# Patient Record
Sex: FEMALE | Race: OTHER | HISPANIC OR LATINO | Employment: UNEMPLOYED | URBAN - METROPOLITAN AREA
[De-identification: names, ages, dates, MRNs, and addresses within clinical notes are randomized per-mention and may not be internally consistent; named-entity substitution may affect disease eponyms.]

---

## 2021-04-12 ENCOUNTER — IMMUNIZATIONS (OUTPATIENT)
Dept: FAMILY MEDICINE CLINIC | Facility: HOSPITAL | Age: 56
End: 2021-04-12

## 2021-04-12 DIAGNOSIS — Z23 ENCOUNTER FOR IMMUNIZATION: Primary | ICD-10-CM

## 2021-04-12 PROCEDURE — 0001A SARS-COV-2 / COVID-19 MRNA VACCINE (PFIZER-BIONTECH) 30 MCG: CPT

## 2021-04-12 PROCEDURE — 91300 SARS-COV-2 / COVID-19 MRNA VACCINE (PFIZER-BIONTECH) 30 MCG: CPT

## 2021-05-07 ENCOUNTER — IMMUNIZATIONS (OUTPATIENT)
Dept: FAMILY MEDICINE CLINIC | Facility: HOSPITAL | Age: 56
End: 2021-05-07

## 2021-05-07 DIAGNOSIS — Z23 ENCOUNTER FOR IMMUNIZATION: Primary | ICD-10-CM

## 2021-05-07 PROCEDURE — 91300 SARS-COV-2 / COVID-19 MRNA VACCINE (PFIZER-BIONTECH) 30 MCG: CPT

## 2021-05-07 PROCEDURE — 0002A SARS-COV-2 / COVID-19 MRNA VACCINE (PFIZER-BIONTECH) 30 MCG: CPT

## 2021-05-12 ENCOUNTER — OFFICE VISIT (OUTPATIENT)
Dept: INTERNAL MEDICINE CLINIC | Facility: CLINIC | Age: 56
End: 2021-05-12
Payer: COMMERCIAL

## 2021-05-12 VITALS
OXYGEN SATURATION: 98 % | TEMPERATURE: 97 F | WEIGHT: 148 LBS | HEART RATE: 73 BPM | SYSTOLIC BLOOD PRESSURE: 134 MMHG | DIASTOLIC BLOOD PRESSURE: 88 MMHG | HEIGHT: 64 IN | BODY MASS INDEX: 25.27 KG/M2

## 2021-05-12 DIAGNOSIS — Z11.4 SCREENING FOR HIV (HUMAN IMMUNODEFICIENCY VIRUS): ICD-10-CM

## 2021-05-12 DIAGNOSIS — Z11.59 NEED FOR HEPATITIS C SCREENING TEST: ICD-10-CM

## 2021-05-12 DIAGNOSIS — Z12.31 ENCOUNTER FOR SCREENING MAMMOGRAM FOR MALIGNANT NEOPLASM OF BREAST: ICD-10-CM

## 2021-05-12 DIAGNOSIS — G43.019 INTRACTABLE MIGRAINE WITHOUT AURA AND WITHOUT STATUS MIGRAINOSUS: ICD-10-CM

## 2021-05-12 DIAGNOSIS — Z00.00 ANNUAL PHYSICAL EXAM: Primary | ICD-10-CM

## 2021-05-12 PROCEDURE — 3725F SCREEN DEPRESSION PERFORMED: CPT | Performed by: FAMILY MEDICINE

## 2021-05-12 PROCEDURE — 99386 PREV VISIT NEW AGE 40-64: CPT | Performed by: FAMILY MEDICINE

## 2021-05-12 NOTE — PROGRESS NOTES
ADULT ANNUAL [address]     NAME: Marisol Rodríguez  AGE: 63 y o  SEX: female  : 1965     DATE: 2021     Assessment and Plan:     Problem List Items Addressed This Visit     None      Visit Diagnoses     Annual physical exam    -  Primary    Relevant Orders    Comprehensive metabolic panel    Lipid Panel with Direct LDL reflex    Intractable migraine without aura and without status migrainosus        Relevant Orders    Ambulatory referral to Neurology    Need for hepatitis C screening test        Relevant Orders    Hepatitis C Antibody ([facility])    Screening for HIV (human immunodeficiency virus)        Relevant Orders    HIV 1/2 Antigen/Antibody (4th Generation) w Reflex [facility]    Encounter for screening mammogram for malignant neoplasm of breast        Relevant Orders    Mammo screening bilateral w 3d & cad        Plan: well adult with migraine refractory to triptans and topomax  neurology referral placed  Screening labs and studies ordered  Immunizations and preventive care screenings were discussed with patient today  Appropriate education was printed on patient's after visit summary  Counseling:  Dental Health: discussed importance of regular tooth brushing, flossing, and dental visits  · Exercise: the importance of regular exercise/physical activity was discussed  Recommend exercise 3-5 times per week for at least 30 minutes  Return in about 3 months (around 2021) for well woman exam       Chief Complaint:     Chief Complaint   Patient presents with    Establish Care      History of Present Illness:     Adult Annual Physical   Patient here for a comprehensive physical exam  The patient reports problems - migraines  years  saw specialist  [facility] didnt cover medication that worked   Sometimes  A migraine can last a whole week  Tried Imitrex and Topamax  but it didn't help   Usually right side of head  contant throbbing  No aura  Will have nasuea, vomiting, jaw pain, associated with migraine   Kept a headache diary for a period of time  Light of devices and some foods can trigger  Diet and Physical Activity  · Diet/Nutrition: well balanced diet  · Exercise: no formal exercise  Depression Screening  PHQ-9 Depression Screening    PHQ-9:   Frequency of the following problems over the past two weeks:      Little interest or pleasure in doing things: 0 - not at all  Feeling down, depressed, or hopeless: 0 - not at all  PHQ-2 Score: 0       General Health  · Sleep: gets 4-6 hours of sleep on average  · Hearing: normal - bilateral   · Vision: most recent eye exam <1 year ago and wears glasses  · Dental: no dental visits for >1 year  /GYN Health  · Patient is: postmenopausal  · Last menstrual period: 11 years aago   · Last mammo-4 years ago   · Last pap-4 years ago   · Last colon cancer screening -2 year ago  Review of Systems:     Review of Systems   HENT: Negative for congestion, postnasal drip, rhinorrhea and sore throat  Eyes: Negative for visual disturbance  Respiratory: Negative for shortness of breath  Cardiovascular: Negative for chest pain  Gastrointestinal: Negative for blood in stool, constipation and diarrhea  Genitourinary: Negative for difficulty urinating and hematuria  Musculoskeletal: Negative for arthralgias, back pain and joint swelling (hands)  Neurological: Positive for headaches  Past Medical History:     History reviewed  No pertinent past medical history  Past Surgical History:     History reviewed  No pertinent surgical history     Social History:     E-Cigarette/Vaping    E-Cigarette Use Never User      E-Cigarette/Vaping Substances    Nicotine No     THC No     CBD No     Flavoring No     Other No     Unknown No      Social History     Socioeconomic History    Marital status: /Civil Union     Spouse name: None    Number of children: None    Years of education: None    Highest education level: None   Occupational History    None   Social Needs    Financial resource strain: None    Food insecurity     Worry: None     Inability: None    Transportation needs     Medical: None     Non-medical: None   Tobacco Use    Smoking status: Never Smoker    Smokeless tobacco: Never Used   Substance and Sexual Activity    Alcohol use: None    Drug use: None    Sexual activity: None   Lifestyle    Physical activity     Days per week: None     Minutes per session: None    Stress: None   Relationships    Social connections     Talks on phone: None     Gets together: None     Attends Synagogue service: None     Active member of club or organization: None     Attends meetings of clubs or organizations: None     Relationship status: None    Intimate partner violence     Fear of current or ex partner: None     Emotionally abused: None     Physically abused: None     Forced sexual activity: None   Other Topics Concern    None   Social History Narrative    None      Family History:     History reviewed  No pertinent family history  Current Medications:     No current outpatient medications on file  No current facility-administered medications for this visit  Allergies:     No Known Allergies   Physical Exam:     /88 (BP Location: Left arm, Patient Position: Sitting) Comment: fsdfsdf  Pulse 73   Temp (!) 97 °F (36 1 °C) (Tympanic) Comment: fsdfs  Ht 5' 4" (1 626 m)   Wt 67 1 kg (148 lb)   SpO2 98%   BMI 25 40 kg/m²     Physical Exam  HENT:      Head: Normocephalic and atraumatic  Right Ear: Tympanic membrane and external ear normal       Left Ear: Tympanic membrane and external ear normal       Nose: Nose normal       Mouth/Throat:      Pharynx: Oropharynx is clear  Comments: Tonsils absent   Eyes:      Extraocular Movements: Extraocular movements intact        Conjunctiva/sclera: Conjunctivae normal  Pupils: Pupils are equal, round, and reactive to light  Cardiovascular:      Rate and Rhythm: Normal rate and regular rhythm  Heart sounds: No murmur  Pulmonary:      Effort: Pulmonary effort is normal       Breath sounds: Normal breath sounds  Abdominal:      General: Bowel sounds are normal       Palpations: Abdomen is soft  Tenderness: There is no abdominal tenderness  Musculoskeletal:      Right lower leg: No edema  Left lower leg: No edema  Skin:     Capillary Refill: Capillary refill takes less than 2 seconds  Neurological:      Mental Status: She is alert and oriented to person, place, and time  Gait: Gait normal       Deep Tendon Reflexes: Reflexes normal    Psychiatric:         Mood and Affect: Mood normal          Behavior: Behavior normal          Thought Content:  Thought content normal             Nelson Dorman DO  [facility]

## 2021-05-12 NOTE — PATIENT INSTRUCTIONS

## 2021-05-19 ENCOUNTER — TELEPHONE (OUTPATIENT)
Dept: NEUROLOGY | Facility: CLINIC | Age: 56
End: 2021-05-19

## 2021-05-19 NOTE — TELEPHONE ENCOUNTER
Best contact number for patient:    Emergency Contact name and number:    Referring provider and telephone     Primary Care Provider Name and if affiliated with [facility]: Ramon Ewing     Reason for Appointment/Dx:Migraines         Neurology Location patient would like to be seen:[facility]     Order received? Yes                                                 Records Received? No     Have you ever seen another Neurologist?       No     Insurance [address] [place] [address]     ID/Policy #:    Secondary Insurance:    ID/Policy#: Workman's Comp/ Accident/ School  Information      Workman's Comp/Accident/School related?        None     If yes name of Insurance company:    Claim #:    Date of Injury:    Type of Injury:     Name and Telephone Number:    Notes:Appointment Appointment schedule with patient new patient pack sent                    Appointment date: 09-16-21 1:00pm

## 2021-05-21 ENCOUNTER — APPOINTMENT (OUTPATIENT)
Dept: LAB | Facility: HOSPITAL | Age: 56
End: 2021-05-21
Payer: COMMERCIAL

## 2021-05-21 DIAGNOSIS — Z11.4 SCREENING FOR HIV (HUMAN IMMUNODEFICIENCY VIRUS): ICD-10-CM

## 2021-05-21 DIAGNOSIS — Z00.00 ANNUAL PHYSICAL EXAM: ICD-10-CM

## 2021-05-21 DIAGNOSIS — Z11.59 NEED FOR HEPATITIS C SCREENING TEST: ICD-10-CM

## 2021-05-21 LAB
ALBUMIN SERPL BCP-MCNC: 4 G/DL (ref 3.5–5)
ALP SERPL-CCNC: 94 U/L (ref 46–116)
ALT SERPL W P-5'-P-CCNC: 50 U/L (ref 12–78)
ANION GAP SERPL CALCULATED.3IONS-SCNC: 6 MMOL/L (ref 4–13)
AST SERPL W P-5'-P-CCNC: 28 U/L (ref 5–45)
BILIRUB SERPL-MCNC: 1.02 MG/DL (ref 0.2–1)
BUN SERPL-MCNC: 14 MG/DL (ref 5–25)
CALCIUM SERPL-MCNC: 8.7 MG/DL (ref 8.3–10.1)
CHLORIDE SERPL-SCNC: 106 MMOL/L (ref 100–108)
CHOLEST SERPL-MCNC: 220 MG/DL (ref 50–200)
CO2 SERPL-SCNC: 32 MMOL/L (ref 21–32)
CREAT SERPL-MCNC: 0.84 MG/DL (ref 0.6–1.3)
GFR SERPL CREATININE-BSD FRML MDRD: 78 ML/MIN/1.73SQ M
GLUCOSE P FAST SERPL-MCNC: 93 MG/DL (ref 65–99)
HCV AB SER QL: NORMAL
HDLC SERPL-MCNC: 43 MG/DL
LDLC SERPL CALC-MCNC: 153 MG/DL (ref 0–100)
POTASSIUM SERPL-SCNC: 4 MMOL/L (ref 3.5–5.3)
PROT SERPL-MCNC: 7.8 G/DL (ref 6.4–8.2)
SODIUM SERPL-SCNC: 144 MMOL/L (ref 136–145)
TRIGL SERPL-MCNC: 119 MG/DL

## 2021-05-21 PROCEDURE — 36415 COLL VENOUS BLD VENIPUNCTURE: CPT

## 2021-05-21 PROCEDURE — 80061 LIPID PANEL: CPT

## 2021-05-21 PROCEDURE — 80053 COMPREHEN METABOLIC PANEL: CPT

## 2021-05-21 PROCEDURE — 87389 HIV-1 AG W/HIV-1&-2 AB AG IA: CPT

## 2021-05-21 PROCEDURE — 86803 HEPATITIS C AB TEST: CPT

## 2021-05-24 LAB — HIV 1+2 AB+HIV1 P24 AG SERPL QL IA: NORMAL

## 2021-05-26 ENCOUNTER — OFFICE VISIT (OUTPATIENT)
Dept: INTERNAL MEDICINE CLINIC | Facility: CLINIC | Age: 56
End: 2021-05-26
Payer: COMMERCIAL

## 2021-05-26 VITALS
TEMPERATURE: 97.9 F | BODY MASS INDEX: 25.1 KG/M2 | DIASTOLIC BLOOD PRESSURE: 70 MMHG | HEIGHT: 64 IN | OXYGEN SATURATION: 98 % | SYSTOLIC BLOOD PRESSURE: 122 MMHG | WEIGHT: 147 LBS | HEART RATE: 66 BPM

## 2021-05-26 DIAGNOSIS — Z12.11 SCREENING FOR COLORECTAL CANCER: ICD-10-CM

## 2021-05-26 DIAGNOSIS — Z23 ENCOUNTER FOR IMMUNIZATION: ICD-10-CM

## 2021-05-26 DIAGNOSIS — Z01.419 ENCOUNTER FOR GYNECOLOGICAL EXAMINATION WITHOUT ABNORMAL FINDING: Primary | ICD-10-CM

## 2021-05-26 DIAGNOSIS — Z12.12 SCREENING FOR COLORECTAL CANCER: ICD-10-CM

## 2021-05-26 PROCEDURE — 90471 IMMUNIZATION ADMIN: CPT

## 2021-05-26 PROCEDURE — 0503F POSTPARTUM CARE VISIT: CPT | Performed by: FAMILY MEDICINE

## 2021-05-26 PROCEDURE — 1036F TOBACCO NON-USER: CPT | Performed by: FAMILY MEDICINE

## 2021-05-26 PROCEDURE — 3008F BODY MASS INDEX DOCD: CPT | Performed by: FAMILY MEDICINE

## 2021-05-26 PROCEDURE — 99396 PREV VISIT EST AGE 40-64: CPT | Performed by: FAMILY MEDICINE

## 2021-05-26 PROCEDURE — 90715 TDAP VACCINE 7 YRS/> IM: CPT

## 2021-05-26 PROCEDURE — G0145 SCR C/V CYTO,THINLAYER,RESCR: HCPCS | Performed by: FAMILY MEDICINE

## 2021-05-26 PROCEDURE — 87624 HPV HI-RISK TYP POOLED RSLT: CPT | Performed by: FAMILY MEDICINE

## 2021-05-26 RX ORDER — RIZATRIPTAN BENZOATE 5 MG/1
5 TABLET ORAL ONCE AS NEEDED
COMMUNITY
End: 2022-08-09 | Stop reason: ALTCHOICE

## 2021-05-26 RX ORDER — NAPROXEN SODIUM 220 MG
220 TABLET ORAL ONCE AS NEEDED
COMMUNITY

## 2021-05-26 NOTE — PROGRESS NOTES
Subjective  Vinton Spurling is a 63 y o   female who presents today for her Annual GYN exam    FDLMP: POST ALEX     She is not currently sexually active  The patient has not had a new sexual partner(s)  She denies abdominal, pelvic pain, problems with her bladder or bowels, vaginal bleeding, vaginal discharge                Screenings / HCM  Vaccines: Indicated today: Tdap   Immunization History   Administered Date(s) Administered    Influenza Quadrivalent Preservative Free 3 years and older IM 10/14/2020    SARS-CoV-2 / COVID-19 mRNA IM ([facility]) 2021, 2021           Cervical Cancer screening:    History of Abnormal Pap: yes   Last Pap: 3 yrs ago     Colon Cancer screening:    Family history of colon cancer: NO   Last colonoscopy: ; [facility]     Breast Cancer screening:    Family history of breast cancer: NO    Last mammogram: ;  Upcoming one is scheduled       Social History     Socioeconomic History    Marital status: /Civil Union     Spouse name: None    Number of children: None    Years of education: None    Highest education level: None   Occupational History    None   Social Needs    Financial resource strain: None    Food insecurity     Worry: None     Inability: None    Transportation needs     Medical: None     Non-medical: None   Tobacco Use    Smoking status: Never Smoker    Smokeless tobacco: Never Used   Substance and Sexual Activity    Alcohol use: None    Drug use: None    Sexual activity: None   Lifestyle    Physical activity     Days per week: None     Minutes per session: None    Stress: None   Relationships    Social connections     Talks on phone: None     Gets together: None     Attends Restorationism service: None     Active member of club or organization: None     Attends meetings of clubs or organizations: None     Relationship status: None    Intimate partner violence     Fear of current or ex partner: None     Emotionally abused: None     Physically abused: None     Forced sexual activity: None   Other Topics Concern    None   Social History Narrative    None       Objective  /70 (BP Location: Left arm, Patient Position: Sitting) Comment: gdf  Pulse 66   Temp 97 9 °F (36 6 °C) (Tympanic) Comment: GFD  Ht 5' 4" (1 626 m)   Wt 66 7 kg (147 lb)   SpO2 98%   BMI 25 23 kg/m²   General  Well developed, well nourished,   Neck  Supple, no thyromegaly  Cardio  Regular rate and rhythm  Lungs  Clear to auscultation bilaterally  Abd  Soft, non-tender, non-distended, no hepatosplenomegaly, no hernia  Breast  Not indicated (USPSTF Guidelines)    External genitalia is normal without lesions  Vagina is pink and vault without discharge  Non-tender cervix, without discharge  Sarepta except area of white scarring noted from 2o'clock to 4 o'clock position    Uterus is mobile, non-tender, and normal in size / shape  Adnexa: Without masses or tenderness B/L  The following portions of the patient's history were reviewed and updated as appropriate: allergies, current medications, past family history, past medical history, past social history, past surgical history and problem list         ASSESSMENT & PLAN:     63 y o   woman for annual GYN exam     - Pap smear W/HR HPV wascollected   - Education about self breast exam provided  - Mammogram already ordered   - Colon cancer screening was ordered  - continue multivitamin, calcium and vitamin D supplementation   - vaccine given and well tolerated  Encounter for gynecological examination without abnormal finding  -     Cervical or vaginal cytopath, thin prep; Future  -     Cervical or vaginal cytopath, thin prep    Screening for colorectal cancer  -     Cologuard; Future    Encounter for immunization  -     TDAP VACCINE GREATER THAN OR EQUAL TO 6YO IM    Other orders  -     naproxen sodium (ALEVE) 220 MG tablet;  Take 220 mg by mouth  -     rizatriptan (MAXALT) 5 MG tablet; Take 5 mg by mouth once as needed for migraine May repeat in 2 hours if needed            Patient Instructions     Breast Self Exam for Women   WHAT YOU NEED TO KNOW:   What is a breast self-exam (BSE)? A BSE is a way to check your breasts for lumps and other changes  Regular BSEs can help you know how your breasts normally look and feel  Most breast lumps or changes are not cancer, but you should always have them checked by a healthcare provider  Your healthcare provider can also watch you do a BSE and can tell you if you are doing your BSE correctly  Why should I do a BSE? Breast cancer is the most common type of cancer in women  Even if you have mammograms, you may still want to do a BSE regularly  If you know how your breasts normally feel and look, it may help you know when to contact your healthcare provider  Mammograms can miss some cancers  You may find a lump during a BSE that did not show up on a mammogram   When should I do a BSE? If you have periods, you may want to do your BSE 1 week after your period ends  This is the time when your breasts may be the least swollen, lumpy, or tender  You can do regular BSEs even if you are breastfeeding or have breast implants  How should I do a BSE? · Look at your breasts in a mirror  Look at the size and shape of each breast and nipple  Check for swelling, lumps, dimpling, scaly skin, or other skin changes  Look for nipple changes, such as a nipple that is painful or beginning to pull inward  Gently squeeze both nipples and check to see if fluid (that is not breast milk) comes out of them  If you find any of these or other breast changes, contact your healthcare provider  Check your breasts while you sit or  the following 3 positions:    ? Hang your arms down at your sides  ? Raise your hands and join them behind your head  ? Put firm pressure with your hands on your hips   Bend slightly forward while you look at your breasts in the mirror  · Lie down and feel your breasts  When you lie down, your breast tissue spreads out evenly over your chest  This makes it easier for you to feel for lumps and anything that may not be normal for your breasts  Do a BSE on one breast at a time  ? Place a small pillow or towel under your left shoulder  Put your left arm behind your head  ? Use the 3 middle fingers of your right hand  Use your fingertip pads, on the top of your fingers  Your fingertip pad is the most sensitive part of your finger  ? Use small circles to feel your breast tissue  Use your fingertip pads to make dime-sized, overlapping circles on your breast and armpits  Use light, medium, and firm pressure  First, press lightly  Second, press with medium pressure to feel a little deeper into the breast  Last, use firm pressure to feel deep within your breast     ? Examine your entire breast area  Examine the breast area from above the breast to below the breast where you feel only ribs  Make small circles with your fingertips, starting in the middle of your armpit  Make circles going up and down the breast area  Continue toward your breast and all the way across it  Examine the area from your armpit all the way over to the middle of your chest (breastbone)  Stop at the middle of your chest     ? Move the pillow or towel to your right shoulder, and put your right arm behind your head  Use the 3 fingertip pads of your left hand, and repeat the above steps to do a BSE on your right breast   What else can I do to check for breast problems or cancer? Talk to your healthcare provider about mammograms  A mammogram is an x-ray of your breasts to screen for breast cancer or other problems  Your provider can tell you the benefits and risks of mammograms  The first mammogram is usually at age 39 or 48  Your provider may recommend you start at 36 or younger if your risk for breast cancer is high   Mammograms usually continue every 1 to 2 years until age 76  When should I call my doctor? · You find any lumps or changes in your breasts  · You have breast pain or fluid coming from your nipples  · You have questions or concerns or concerns about your condition or care  CARE AGREEMENT:   You have the right to help plan your care  Learn about your health condition and how it may be treated  Discuss treatment options with your healthcare providers to decide what care you want to receive  You always have the right to refuse treatment  The above information is an  only  It is not intended as medical advice for individual conditions or treatments  Talk to your doctor, nurse or pharmacist before following any medical regimen to see if it is safe and effective for you  © Copyright [employer] 2021 Information is for End User's use only and may not be sold, redistributed or otherwise used for commercial purposes  All illustrations and images included in CareNotes® are the copyrighted property of A D A M , Inc  or [address] [place] [address] [place]             BMI Counseling: Body mass index is 25 23 kg/m²  The BMI is above normal  Nutrition recommendations include 3-5 servings of fruits/vegetables daily, consuming healthier snacks and moderation in carbohydrate intake

## 2021-05-26 NOTE — PATIENT INSTRUCTIONS
Breast Self Exam for Women   WHAT YOU NEED TO KNOW:   What is a breast self-exam (BSE)? A BSE is a way to check your breasts for lumps and other changes  Regular BSEs can help you know how your breasts normally look and feel  Most breast lumps or changes are not cancer, but you should always have them checked by a healthcare provider  Your healthcare provider can also watch you do a BSE and can tell you if you are doing your BSE correctly  Why should I do a BSE? Breast cancer is the most common type of cancer in women  Even if you have mammograms, you may still want to do a BSE regularly  If you know how your breasts normally feel and look, it may help you know when to contact your healthcare provider  Mammograms can miss some cancers  You may find a lump during a BSE that did not show up on a mammogram   When should I do a BSE? If you have periods, you may want to do your BSE 1 week after your period ends  This is the time when your breasts may be the least swollen, lumpy, or tender  You can do regular BSEs even if you are breastfeeding or have breast implants  How should I do a BSE? · Look at your breasts in a mirror  Look at the size and shape of each breast and nipple  Check for swelling, lumps, dimpling, scaly skin, or other skin changes  Look for nipple changes, such as a nipple that is painful or beginning to pull inward  Gently squeeze both nipples and check to see if fluid (that is not breast milk) comes out of them  If you find any of these or other breast changes, contact your healthcare provider  Check your breasts while you sit or  the following 3 positions:    ? Hang your arms down at your sides  ? Raise your hands and join them behind your head  ? Put firm pressure with your hands on your hips  Bend slightly forward while you look at your breasts in the mirror  · Lie down and feel your breasts    When you lie down, your breast tissue spreads out evenly over your chest  This makes it easier for you to feel for lumps and anything that may not be normal for your breasts  Do a BSE on one breast at a time  ? Place a small pillow or towel under your left shoulder  Put your left arm behind your head  ? Use the 3 middle fingers of your right hand  Use your fingertip pads, on the top of your fingers  Your fingertip pad is the most sensitive part of your finger  ? Use small circles to feel your breast tissue  Use your fingertip pads to make dime-sized, overlapping circles on your breast and armpits  Use light, medium, and firm pressure  First, press lightly  Second, press with medium pressure to feel a little deeper into the breast  Last, use firm pressure to feel deep within your breast     ? Examine your entire breast area  Examine the breast area from above the breast to below the breast where you feel only ribs  Make small circles with your fingertips, starting in the middle of your armpit  Make circles going up and down the breast area  Continue toward your breast and all the way across it  Examine the area from your armpit all the way over to the middle of your chest (breastbone)  Stop at the middle of your chest     ? Move the pillow or towel to your right shoulder, and put your right arm behind your head  Use the 3 fingertip pads of your left hand, and repeat the above steps to do a BSE on your right breast   What else can I do to check for breast problems or cancer? Talk to your healthcare provider about mammograms  A mammogram is an x-ray of your breasts to screen for breast cancer or other problems  Your provider can tell you the benefits and risks of mammograms  The first mammogram is usually at age 39 or 48  Your provider may recommend you start at 36 or younger if your risk for breast cancer is high  Mammograms usually continue every 1 to 2 years until age 76  When should I call my doctor? · You find any lumps or changes in your breasts      · You have breast pain or fluid coming from your nipples  · You have questions or concerns or concerns about your condition or care  CARE AGREEMENT:   You have the right to help plan your care  Learn about your health condition and how it may be treated  Discuss treatment options with your healthcare providers to decide what care you want to receive  You always have the right to refuse treatment  The above information is an  only  It is not intended as medical advice for individual conditions or treatments  Talk to your doctor, nurse or pharmacist before following any medical regimen to see if it is safe and effective for you  © Copyright [address] 2021 Information is for End User's use only and may not be sold, redistributed or otherwise used for commercial purposes   All illustrations and images included in CareNotes® are the copyrighted property of A D A M , Inc  or [address] [place] [address]

## 2021-05-27 LAB
HPV HR 12 DNA CVX QL NAA+PROBE: NEGATIVE
HPV16 DNA CVX QL NAA+PROBE: NEGATIVE
HPV18 DNA CVX QL NAA+PROBE: NEGATIVE

## 2021-06-01 LAB
LAB AP GYN PRIMARY INTERPRETATION: NORMAL
Lab: NORMAL

## 2021-06-15 ENCOUNTER — HOSPITAL ENCOUNTER (OUTPATIENT)
Dept: MAMMOGRAPHY | Facility: CLINIC | Age: 56
Discharge: HOME | End: 2021-06-15
Payer: COMMERCIAL

## 2021-06-15 VITALS — WEIGHT: 147 LBS | BODY MASS INDEX: 25.1 KG/M2 | HEIGHT: 64 IN

## 2021-06-15 DIAGNOSIS — Z12.31 ENCOUNTER FOR SCREENING MAMMOGRAM FOR MALIGNANT NEOPLASM OF BREAST: ICD-10-CM

## 2021-06-15 PROCEDURE — 77067 SCR MAMMO BI INCL CAD: CPT

## 2021-06-15 PROCEDURE — 77063 BREAST TOMOSYNTHESIS BI: CPT

## 2021-06-21 ENCOUNTER — CONSULT (OUTPATIENT)
Dept: NEUROLOGY | Facility: CLINIC | Age: 56
End: 2021-06-21
Payer: COMMERCIAL

## 2021-06-21 VITALS
WEIGHT: 148 LBS | SYSTOLIC BLOOD PRESSURE: 138 MMHG | DIASTOLIC BLOOD PRESSURE: 90 MMHG | BODY MASS INDEX: 25.27 KG/M2 | HEIGHT: 64 IN | HEART RATE: 66 BPM

## 2021-06-21 DIAGNOSIS — G43.019 INTRACTABLE MIGRAINE WITHOUT AURA AND WITHOUT STATUS MIGRAINOSUS: ICD-10-CM

## 2021-06-21 PROCEDURE — 3008F BODY MASS INDEX DOCD: CPT | Performed by: PSYCHIATRY & NEUROLOGY

## 2021-06-21 PROCEDURE — 1036F TOBACCO NON-USER: CPT | Performed by: PSYCHIATRY & NEUROLOGY

## 2021-06-21 PROCEDURE — 99244 OFF/OP CNSLTJ NEW/EST MOD 40: CPT | Performed by: PSYCHIATRY & NEUROLOGY

## 2021-06-21 RX ORDER — TOPIRAMATE 25 MG/1
25 TABLET ORAL DAILY
Qty: 30 TABLET | Refills: 4 | Status: SHIPPED | OUTPATIENT
Start: 2021-06-21 | End: 2021-09-08 | Stop reason: SDUPTHER

## 2021-06-21 RX ORDER — OMEGA-3S/DHA/EPA/FISH OIL/D3 300MG-1000
400 CAPSULE ORAL DAILY
COMMUNITY

## 2021-06-21 RX ORDER — MULTIVIT WITH MINERALS/LUTEIN
1000 TABLET ORAL DAILY
COMMUNITY

## 2021-06-21 NOTE — PROGRESS NOTES
Donna Hernandez is a 63 y o  female  Chief Complaint   Patient presents with    Migraine       Assessment:  1  Intractable migraine without aura and without status migrainosus        Plan:  MRI scan of the brain   blood work  Topamax 25 mg at nighttime  Discussion:   differential diagnosis discussed with the patient most likely migraine headaches, would recommend an MRI scan of the brain and blood work to evaluate for the headaches, patient to call me after the test to discuss the results, we discussed different prophylactic medications she is agreeable to go on Topamax 25 mg at nighttime side effects of the medication discussed with the patient in detail including kidney stones glaucoma and other side effects, patient to  Stop the medication if experiences any side effects, she was also advised to take magnesium 200 mg a day and riboflavin 200 mg a day, side effects discussed with her in detail, she was advised to go to the hospital if has any worsening symptoms and call me avoid migraine triggers including foods to avoid keep herself well hydrated limit caffeine to 12   Oz per day, avoid stress, to go to the hospital if has any worsening symptoms and call me otherwise to see me back in 2 months and follow up with her  other physicians     patient has been having worsening headaches and hence we have requested for an MRI scan of the brain      Subjective:    HPI    patient is here for evaluation of headaches for the last 20 years, she describes her headaches mostly on the right side associated with photophobia and phonophobia associated with nausea and vomiting 3 times a week, the headaches could last for a few hours to a day, she denies having any visual aura there is no motor or sensory symptoms in upper or lower extremities, she does get some nausea and vomiting at times, no other complaints    Vitals:    06/21/21 1138   BP: 138/90   BP Location: Left arm   Patient Position: Sitting   Cuff Size: Adult Pulse: 66   Weight: 67 1 kg (148 lb)   Height: 5' 4" (1 626 m)       Current Medications    Current Outpatient Medications:     Ascorbic Acid (VITAMIN C ADULT GUMMIES PO), Take 500 mg by mouth daily, Disp: , Rfl:     cholecalciferol (VITAMIN D3) 400 units tablet, Take 400 Units by mouth daily, Disp: , Rfl:     Multiple Vitamins-Minerals (MULTIVITAMIN GUMMIES ADULT PO), Take by mouth daily, Disp: , Rfl:     naproxen sodium (ALEVE) 220 MG tablet, Take 220 mg by mouth once as needed , Disp: , Rfl:     rizatriptan (MAXALT) 5 MG tablet, Take 5 mg by mouth once as needed for migraine May repeat in 2 hours if needed, Disp: , Rfl:     vitamin E, tocopherol, 1,000 units capsule, Take 1,000 Units by mouth daily, Disp: , Rfl:       Allergies  Patient has no known allergies  Past Medical History  History reviewed  No pertinent past medical history  Past Surgical History:  Past Surgical History:   Procedure Laterality Date    GALLBLADDER SURGERY      PATELLA SURGERY  2016    Patella replacement on left knee    TONSILECTOMY AND ADNOIDECTOMY  1971    Tonsilectomy only    TUBAL LIGATION  1989         Family History:  Family History   Problem Relation Age of Onset   Jessica Lama Dementia Mother     Alzheimer's disease Mother     Kidney disease Father     Hypertension Father     No Known Problems Sister     Hypertension Sister     Hyperlipidemia Sister     No Known Problems Brother     Heart attack Maternal Grandmother     Parkinsonism Maternal Grandfather     No Known Problems Paternal Grandmother     No Known Problems Paternal Grandfather     No Known Problems Brother     Scoliosis Son     Scoliosis Son     Anxiety disorder Son     Depression Son        Social History:   reports that she has never smoked  She has never used smokeless tobacco  She reports previous alcohol use  She reports that she does not use drugs      I have reviewed the past medical history, surgical history, social and family history, current medications, allergies vitals, review of systems, and updated this information as appropriate today  Objective:    Physical Exam    Neurological Exam    GENERAL:  Cooperative in no acute distress  Well-developed and well-nourished    HEAD and NECK   Head is atraumatic normocephalic with no lesions or masses  Neck is supple with full range of motion    CARDIOVASCULAR  Carotid Arteries-no carotid bruits  NEUROLOGIC:  Mental Status-the patient is awake alert and oriented without aphasia or apraxia  Cranial Nerves: Visual fields are full to confrontation    Extraocular movements are full without nystagmus  Pupils are 2-1/2 mm and reactive  Face is symmetrical to light touch  Movements of facial expression move symmetrically  Hearing is normal to finger rub bilaterally  Soft palate lifts symmetrically  Shoulder shrug is symmetrical  Tongue is midline without atrophy  Motor: No drift is noted on arm extension  Strength is full in the upper and lower extremities with normal bulk and tone  Sensory: Intact to temperature and vibratory sensation in the upper and lower extremities bilaterally  Cortical function is intact  Coordination: Finger to nose testing is performed accurately  Romberg is negative  Gait reveals a normal base with symmetrical arm swing  Tandem walk is normal   Reflexes:    2+ and symmetrical   no temporal artery tenderness  No cervical spine tenderness          ROS:  Review of Systems   Constitutional: Negative for appetite change, fatigue and fever  HENT: Negative for drooling, ear pain, tinnitus, trouble swallowing and voice change  Eyes: Positive for photophobia and pain  Negative for visual disturbance  Respiratory: Negative for chest tightness and shortness of breath  Cardiovascular: Negative for chest pain, palpitations and leg swelling  Gastrointestinal: Negative for abdominal pain, constipation, diarrhea, nausea and vomiting     Endocrine: Negative for cold intolerance and heat intolerance  Genitourinary: Negative for difficulty urinating, frequency and urgency  Musculoskeletal: Negative for back pain, gait problem, joint swelling, myalgias and neck pain  Skin: Negative for rash  Neurological: Positive for headaches  Negative for dizziness, tremors, seizures, syncope, facial asymmetry, speech difficulty, weakness, light-headedness and numbness  Psychiatric/Behavioral: Negative for agitation, behavioral problems, confusion, decreased concentration, dysphoric mood and sleep disturbance  The patient is not nervous/anxious and is not hyperactive

## 2021-06-22 ENCOUNTER — TELEPHONE (OUTPATIENT)
Dept: NEUROLOGY | Facility: CLINIC | Age: 56
End: 2021-06-22

## 2021-06-22 NOTE — TELEPHONE ENCOUNTER
Dr Esequiel Shahid peer to peer is requested for this patient's MRI Brain   Please take a look at your schedule and let me know when you are available to do it

## 2021-06-22 NOTE — TELEPHONE ENCOUNTER
Esther Holley,    This is a Dr Freeman Leaver patient  [facility] is requesting a [id number] for patients MRI Brain  Please call: [address] [place] [address] [id number]  Tracking#: [id number]  Please document the name of provider you spoke with

## 2021-06-23 NOTE — TELEPHONE ENCOUNTER
Please fax my addended note  to fax [phone]  She said once she has that she will approve it       Thank you

## 2021-07-01 ENCOUNTER — HOSPITAL ENCOUNTER (OUTPATIENT)
Dept: MRI IMAGING | Facility: CLINIC | Age: 56
Discharge: HOME | End: 2021-07-01
Payer: COMMERCIAL

## 2021-07-01 DIAGNOSIS — G43.019 INTRACTABLE MIGRAINE WITHOUT AURA AND WITHOUT STATUS MIGRAINOSUS: ICD-10-CM

## 2021-07-01 PROCEDURE — 70551 MRI BRAIN STEM W/O DYE: CPT

## 2021-07-01 PROCEDURE — G1004 CDSM NDSC: HCPCS

## 2021-09-08 ENCOUNTER — TELEMEDICINE (OUTPATIENT)
Dept: NEUROLOGY | Facility: CLINIC | Age: 56
End: 2021-09-08

## 2021-09-08 VITALS — BODY MASS INDEX: 25.27 KG/M2 | WEIGHT: 148 LBS | HEIGHT: 64 IN

## 2021-09-08 DIAGNOSIS — G43.019 INTRACTABLE MIGRAINE WITHOUT AURA AND WITHOUT STATUS MIGRAINOSUS: Primary | ICD-10-CM

## 2021-09-08 PROCEDURE — 99213 OFFICE O/P EST LOW 20 MIN: CPT | Performed by: PSYCHIATRY & NEUROLOGY

## 2021-09-08 RX ORDER — TOPIRAMATE 25 MG/1
25 TABLET ORAL 2 TIMES DAILY
Qty: 60 TABLET | Refills: 4 | Status: SHIPPED | OUTPATIENT
Start: 2021-09-08 | End: 2022-08-09 | Stop reason: ALTCHOICE

## 2021-09-08 NOTE — PROGRESS NOTES
Virtual Regular Visit    Verification of patient location:    Patient is located in the following state in which I hold an active license [place]      Assessment/Plan:  1  Intractable migraine without aura and without status migrainosus         patient's MRI scan of the brain results were reviewed with her it was unremarkable except for a few T2 white matter  Intensity, she did not get the blood work advised her to have the blood work done and she will call me after that to discuss the results she is tolerating the Topamax well but since she continues to have some headaches have advised her to increase the Topamax to 25 mg twice a day, continue with magnesium 200 mg a day and riboflavin 200 mg a day, she was advised to avoid migraine triggers which we discussed in detail, to keep her blood pressure cholesterol and sugar under control to go to the hospital if has any worsening symptoms and call me otherwise to see me back in 4 months and follow up with other physicians  Problem List Items Addressed This Visit     None               Reason for visit is   Chief Complaint   Patient presents with    Virtual Regular Visit        Encounter provider Flora Davis MD    Provider located at [address] [id number]      Recent Visits  No visits were found meeting these conditions  Showing recent visits within past 7 days and meeting all other requirements  Today's Visits  Date Type Provider Dept   09/08/21 Telemedicine Flora Davis MD [facility] [phone] today's visits and meeting all other requirements  Future Appointments  No visits were found meeting these conditions  Showing future appointments within next 150 days and meeting all other requirements       The patient was identified by name and date of birth   Lina Snider was informed that this is a telemedicine visit and that the visit is being conducted through telephone  My office door was closed  No one else was in the room  She acknowledged consent and understanding of privacy and security of the video platform  The patient has agreed to participate and understands they can discontinue the visit at any time  Patient is aware this is a billable service  Subjective  Marianne Torres is a 61 y o  female  In follow-up for headaches, since her last visit she feels her headaches are slightly better she still gets 2 headaches a week they are mostly on the right side associated with photophobia and phonophobia throbbing in nature about 8 on 10 it can last for a few hours to couple of days Topamax seems to have helped her  To a little extent, she denies having any vision or speech difficulty no motor or sensory symptoms in upper or lower extremities, no other complaints  HPI     No past medical history on file  Past Surgical History:   Procedure Laterality Date    GALLBLADDER SURGERY      PATELLA SURGERY  2016    Patella replacement on left knee    TONSILECTOMY AND ADNOIDECTOMY  1971    Tonsilectomy only    TUBAL LIGATION  1989       Current Outpatient Medications   Medication Sig Dispense Refill    Ascorbic Acid (VITAMIN C ADULT GUMMIES PO) Take 500 mg by mouth daily      cholecalciferol (VITAMIN D3) 400 units tablet Take 400 Units by mouth daily      Multiple Vitamins-Minerals (MULTIVITAMIN GUMMIES ADULT PO) Take by mouth daily      naproxen sodium (ALEVE) 220 MG tablet Take 220 mg by mouth once as needed       rizatriptan (MAXALT) 5 MG tablet Take 5 mg by mouth once as needed for migraine May repeat in 2 hours if needed      topiramate (TOPAMAX) 25 mg tablet Take 1 tablet (25 mg total) by mouth daily 30 tablet 4    vitamin E, tocopherol, 1,000 units capsule Take 1,000 Units by mouth daily       No current facility-administered medications for this visit  No Known Allergies    Review of Systems   Constitutional: Negative  Negative for appetite change and fever  HENT: Negative  Negative for hearing loss, tinnitus, trouble swallowing and voice change  Eyes: Negative  Negative for photophobia and pain  Respiratory: Negative  Negative for shortness of breath  Cardiovascular: Negative  Negative for palpitations  Gastrointestinal: Negative  Negative for nausea and vomiting  Endocrine: Negative  Negative for cold intolerance  Genitourinary: Negative  Negative for dysuria, frequency and urgency  Musculoskeletal: Negative  Negative for myalgias and neck pain  Skin: Negative  Negative for rash  Allergic/Immunologic: Negative  Neurological: Negative  Negative for dizziness, tremors, seizures, syncope, facial asymmetry, speech difficulty, weakness, light-headedness, numbness and headaches  Hematological: Negative  Does not bruise/bleed easily  Psychiatric/Behavioral: Negative  Negative for confusion, hallucinations and sleep disturbance  I have reviewed the past medical history, surgical history, social and family history, current medications, allergies vitals, review of systems, and updated this information as appropriate today  Video Exam    Vitals:    09/08/21 0908   Weight: 67 1 kg (148 lb)   Height: 5' 4" (1 626 m)       It was my intent to perform this visit via video technology but the patient was not able to do a video connection so the visit was completed via audio telephone only  I spent 10 minutes directly with the patient during this visit    VIRTUAL VISIT DISCLAIMER      Odalis Grey verbally agrees to participate in [employer]  Pt is aware that [employer] could be limited without vital signs or the ability to perform a full hands-on physical Jansen Rustam understands she or the provider may request at any time to terminate the video visit and request the patient to seek care or treatment in person

## 2021-12-17 ENCOUNTER — TELEPHONE (OUTPATIENT)
Dept: INTERNAL MEDICINE CLINIC | Facility: CLINIC | Age: 56
End: 2021-12-17

## 2022-01-21 ENCOUNTER — IMMUNIZATIONS (OUTPATIENT)
Dept: FAMILY MEDICINE CLINIC | Facility: HOSPITAL | Age: 57
End: 2022-01-21

## 2022-01-21 DIAGNOSIS — Z23 ENCOUNTER FOR IMMUNIZATION: Primary | ICD-10-CM

## 2022-01-21 PROCEDURE — 0001A COVID-19 PFIZER VACC 0.3 ML: CPT

## 2022-01-21 PROCEDURE — 91300 COVID-19 PFIZER VACC 0.3 ML: CPT

## 2022-03-16 ENCOUNTER — APPOINTMENT (EMERGENCY)
Dept: CT IMAGING | Facility: HOSPITAL | Age: 57
End: 2022-03-16
Payer: COMMERCIAL

## 2022-03-16 ENCOUNTER — APPOINTMENT (EMERGENCY)
Dept: RADIOLOGY | Facility: HOSPITAL | Age: 57
End: 2022-03-16
Payer: COMMERCIAL

## 2022-03-16 ENCOUNTER — HOSPITAL ENCOUNTER (EMERGENCY)
Facility: HOSPITAL | Age: 57
Discharge: HOME | End: 2022-03-16
Attending: EMERGENCY MEDICINE
Payer: COMMERCIAL

## 2022-03-16 VITALS
DIASTOLIC BLOOD PRESSURE: 75 MMHG | OXYGEN SATURATION: 99 % | RESPIRATION RATE: 20 BRPM | SYSTOLIC BLOOD PRESSURE: 154 MMHG | TEMPERATURE: 98.8 F | HEART RATE: 80 BPM

## 2022-03-16 DIAGNOSIS — S72.402A CLOSED FRACTURE OF LEFT DISTAL FEMUR (HCC): Primary | ICD-10-CM

## 2022-03-16 LAB
ANION GAP SERPL CALCULATED.3IONS-SCNC: 6 MMOL/L (ref 4–13)
APTT PPP: 28 SECONDS (ref 23–37)
BASOPHILS # BLD AUTO: 0.04 THOUSANDS/ΜL (ref 0–0.1)
BASOPHILS NFR BLD AUTO: 1 % (ref 0–1)
BUN SERPL-MCNC: 17 MG/DL (ref 5–25)
CALCIUM SERPL-MCNC: 9 MG/DL (ref 8.3–10.1)
CHLORIDE SERPL-SCNC: 101 MMOL/L (ref 100–108)
CO2 SERPL-SCNC: 32 MMOL/L (ref 21–32)
CREAT SERPL-MCNC: 0.78 MG/DL (ref 0.6–1.3)
EOSINOPHIL # BLD AUTO: 0.05 THOUSAND/ΜL (ref 0–0.61)
EOSINOPHIL NFR BLD AUTO: 1 % (ref 0–6)
ERYTHROCYTE [DISTWIDTH] IN BLOOD BY AUTOMATED COUNT: 12.5 % (ref 11.6–15.1)
GFR SERPL CREATININE-BSD FRML MDRD: 84 ML/MIN/1.73SQ M
GLUCOSE SERPL-MCNC: 89 MG/DL (ref 65–140)
HCT VFR BLD AUTO: 42.3 % (ref 34.8–46.1)
HGB BLD-MCNC: 14.3 G/DL (ref 11.5–15.4)
IMM GRANULOCYTES # BLD AUTO: 0.01 THOUSAND/UL (ref 0–0.2)
IMM GRANULOCYTES NFR BLD AUTO: 0 % (ref 0–2)
INR PPP: 1.05 (ref 0.84–1.19)
LYMPHOCYTES # BLD AUTO: 1.11 THOUSANDS/ΜL (ref 0.6–4.47)
LYMPHOCYTES NFR BLD AUTO: 14 % (ref 14–44)
MCH RBC QN AUTO: 29.7 PG (ref 26.8–34.3)
MCHC RBC AUTO-ENTMCNC: 33.8 G/DL (ref 31.4–37.4)
MCV RBC AUTO: 88 FL (ref 82–98)
MONOCYTES # BLD AUTO: 0.42 THOUSAND/ΜL (ref 0.17–1.22)
MONOCYTES NFR BLD AUTO: 5 % (ref 4–12)
NEUTROPHILS # BLD AUTO: 6.27 THOUSANDS/ΜL (ref 1.85–7.62)
NEUTS SEG NFR BLD AUTO: 79 % (ref 43–75)
NRBC BLD AUTO-RTO: 0 /100 WBCS
PLATELET # BLD AUTO: 213 THOUSANDS/UL (ref 149–390)
PMV BLD AUTO: 11.5 FL (ref 8.9–12.7)
POTASSIUM SERPL-SCNC: 3.5 MMOL/L (ref 3.5–5.3)
PROTHROMBIN TIME: 13.3 SECONDS (ref 11.6–14.5)
RBC # BLD AUTO: 4.82 MILLION/UL (ref 3.81–5.12)
SODIUM SERPL-SCNC: 139 MMOL/L (ref 136–145)
WBC # BLD AUTO: 7.9 THOUSAND/UL (ref 4.31–10.16)

## 2022-03-16 PROCEDURE — 86901 BLOOD TYPING SEROLOGIC RH(D): CPT | Performed by: EMERGENCY MEDICINE

## 2022-03-16 PROCEDURE — 73564 X-RAY EXAM KNEE 4 OR MORE: CPT

## 2022-03-16 PROCEDURE — 73590 X-RAY EXAM OF LOWER LEG: CPT

## 2022-03-16 PROCEDURE — 99284 EMERGENCY DEPT VISIT MOD MDM: CPT

## 2022-03-16 PROCEDURE — 73700 CT LOWER EXTREMITY W/O DYE: CPT

## 2022-03-16 PROCEDURE — 96374 THER/PROPH/DIAG INJ IV PUSH: CPT

## 2022-03-16 PROCEDURE — 86900 BLOOD TYPING SEROLOGIC ABO: CPT | Performed by: EMERGENCY MEDICINE

## 2022-03-16 PROCEDURE — G1004 CDSM NDSC: HCPCS

## 2022-03-16 PROCEDURE — 85025 COMPLETE CBC W/AUTO DIFF WBC: CPT | Performed by: EMERGENCY MEDICINE

## 2022-03-16 PROCEDURE — 96361 HYDRATE IV INFUSION ADD-ON: CPT

## 2022-03-16 PROCEDURE — 80048 BASIC METABOLIC PNL TOTAL CA: CPT | Performed by: EMERGENCY MEDICINE

## 2022-03-16 PROCEDURE — 86850 RBC ANTIBODY SCREEN: CPT | Performed by: EMERGENCY MEDICINE

## 2022-03-16 PROCEDURE — 36415 COLL VENOUS BLD VENIPUNCTURE: CPT | Performed by: EMERGENCY MEDICINE

## 2022-03-16 PROCEDURE — 85730 THROMBOPLASTIN TIME PARTIAL: CPT | Performed by: EMERGENCY MEDICINE

## 2022-03-16 PROCEDURE — 99285 EMERGENCY DEPT VISIT HI MDM: CPT | Performed by: EMERGENCY MEDICINE

## 2022-03-16 PROCEDURE — 85610 PROTHROMBIN TIME: CPT | Performed by: EMERGENCY MEDICINE

## 2022-03-16 RX ORDER — OXYCODONE HYDROCHLORIDE AND ACETAMINOPHEN 5; 325 MG/1; MG/1
1 TABLET ORAL EVERY 4 HOURS PRN
Qty: 15 TABLET | Refills: 0 | Status: SHIPPED | OUTPATIENT
Start: 2022-03-16 | End: 2022-03-26

## 2022-03-16 RX ORDER — MORPHINE SULFATE 4 MG/ML
4 INJECTION, SOLUTION INTRAMUSCULAR; INTRAVENOUS ONCE
Status: COMPLETED | OUTPATIENT
Start: 2022-03-16 | End: 2022-03-16

## 2022-03-16 RX ADMIN — MORPHINE SULFATE 4 MG: 4 INJECTION INTRAVENOUS at 16:33

## 2022-03-16 RX ADMIN — SODIUM CHLORIDE 1000 ML: 0.9 INJECTION, SOLUTION INTRAVENOUS at 16:33

## 2022-03-16 NOTE — ED PROVIDER NOTES
History  Chief Complaint   Patient presents with    Fall     Pt had a fall down carpeted stairs around 1030A  and has a hx of  left patellar replacement 6 years ago  Pt fell down approx 5 stairs  Pt has swelling to left knee but denies bruising  62 y/o female presents to the ED for evaluation after a fall that occurred this morning  She states that she was walking down the steps, missed one, and fell down about 5 steps  She states that she fell onto her L knee, which was hyperflexed  She denies any other injury or pain  States that her toes feel numb  Denies hitting her head or LOC  Denies any neck pain  States that she has taken aleve and iced the knee with minimal relief  She reports that she had a prior patella replacement to the knee years ago  She states that she has not been able to ambulate on the leg since  No other complaints  History provided by:  Patient  Knee Pain  Location:  Knee  Injury: yes    Mechanism of injury: fall    Fall:     Fall occurred:  Down stairs    Impact surface:  Stairs    Point of impact:  Knees  Knee location:  L knee  Pain details:     Quality:  Unable to specify    Radiates to:  Does not radiate    Severity:  Moderate    Onset quality:  Sudden    Timing:  Constant    Progression:  Worsening  Chronicity:  New  Prior injury to area:  Yes  Relieved by:  None tried  Worsened by:  Nothing  Ineffective treatments:  NSAIDs  Associated symptoms: decreased ROM, numbness and swelling    Associated symptoms: no back pain, no fever and no neck pain        Prior to Admission Medications   Prescriptions Last Dose Informant Patient Reported? Taking?    Ascorbic Acid (VITAMIN C ADULT GUMMIES PO)   Yes No   Sig: Take 500 mg by mouth daily   Multiple Vitamins-Minerals (MULTIVITAMIN GUMMIES ADULT PO)   Yes No   Sig: Take by mouth daily   cholecalciferol (VITAMIN D3) 400 units tablet   Yes No   Sig: Take 400 Units by mouth daily   naproxen sodium (ALEVE) 220 MG tablet   Yes No   Sig: Take 220 mg by mouth once as needed    rizatriptan (MAXALT) 5 MG tablet   Yes No   Sig: Take 5 mg by mouth once as needed for migraine May repeat in 2 hours if needed   topiramate (TOPAMAX) 25 mg tablet   No No   Sig: Take 1 tablet (25 mg total) by mouth 2 (two) times a day   vitamin E, tocopherol, 1,000 units capsule   Yes No   Sig: Take 1,000 Units by mouth daily      Facility-Administered Medications: None       Past Medical History:   Diagnosis Date    Arthritis        Past Surgical History:   Procedure Laterality Date    GALLBLADDER SURGERY      PATELLA SURGERY  2016    Patella replacement on left knee    REPLACEMENT TOTAL KNEE Left     TONSILECTOMY AND ADNOIDECTOMY  1971    Tonsilectomy only    TUBAL LIGATION  1989       Family History   Problem Relation Age of Onset    Dementia Mother     Alzheimer's disease Mother     Kidney disease Father     Hypertension Father     No Known Problems Sister     Hypertension Sister     Hyperlipidemia Sister     No Known Problems Brother     Heart attack Maternal Grandmother     Parkinsonism Maternal Grandfather     No Known Problems Paternal Grandmother     No Known Problems Paternal Grandfather     No Known Problems Brother     Scoliosis Son     Scoliosis Son     Anxiety disorder Son     Depression Son      I have reviewed and agree with the history as documented  E-Cigarette/Vaping    E-Cigarette Use Never User      E-Cigarette/Vaping Substances    Nicotine No     THC No     CBD No     Flavoring No     Other No     Unknown No      Social History     Tobacco Use    Smoking status: Never Smoker    Smokeless tobacco: Never Used   Vaping Use    Vaping Use: Never used   Substance Use Topics    Alcohol use: Not Currently    Drug use: Never       Review of Systems   Constitutional: Negative for chills and fever  HENT: Negative for congestion, ear pain and sore throat  Eyes: Negative for pain and visual disturbance     Respiratory: Negative for cough, shortness of breath and wheezing  Cardiovascular: Negative for chest pain and leg swelling  Gastrointestinal: Negative for abdominal pain, diarrhea, nausea and vomiting  Genitourinary: Negative for dysuria, frequency, hematuria and urgency  Musculoskeletal: Negative for back pain, neck pain and neck stiffness  Skin: Negative for rash and wound  Neurological: Negative for weakness, numbness and headaches  Psychiatric/Behavioral: Negative for agitation and confusion  All other systems reviewed and are negative  Physical Exam  Physical Exam  Vitals and nursing note reviewed  Constitutional:       Appearance: She is well-developed  HENT:      Head: Normocephalic and atraumatic  Eyes:      Pupils: Pupils are equal, round, and reactive to light  Cardiovascular:      Rate and Rhythm: Normal rate and regular rhythm  Pulmonary:      Effort: Pulmonary effort is normal       Breath sounds: Normal breath sounds  Abdominal:      General: Bowel sounds are normal       Palpations: Abdomen is soft  Tenderness: There is no abdominal tenderness  Musculoskeletal:      Cervical back: Normal range of motion and neck supple  No tenderness  Comments: L knee- swelling and tenderness to the distal femur and proximal tib/fib  Decreased ROM 2/2 pain and swelling  +2/4 DP pulses  Sensation grossly intact distally  Skin:     General: Skin is warm and dry  Neurological:      General: No focal deficit present  Mental Status: She is alert and oriented to person, place, and time        Comments: No focal deficits         Vital Signs  ED Triage Vitals   Temperature Pulse Respirations Blood Pressure SpO2   03/16/22 1446 03/16/22 1441 03/16/22 1441 03/16/22 1441 03/16/22 1441   98 8 °F (37 1 °C) 96 20 (!) 207/104 98 %      Temp Source Heart Rate Source Patient Position - Orthostatic VS BP Location FiO2 (%)   03/16/22 1446 03/16/22 1441 03/16/22 1441 03/16/22 1441 --   Oral Monitor Sitting Right arm       Pain Score       03/16/22 1633       9           Vitals:    03/16/22 1615 03/16/22 1630 03/16/22 1757 03/16/22 1800   BP:  (!) 184/97 161/76 154/75   Pulse: 83 79 83 80   Patient Position - Orthostatic VS:  Sitting           Visual Acuity      ED Medications  Medications   sodium chloride 0 9 % bolus 1,000 mL (0 mL Intravenous Stopped 3/16/22 1751)   morphine (PF) 4 mg/mL injection 4 mg (4 mg Intravenous Given 3/16/22 1633)       Diagnostic Studies  Results Reviewed     Procedure Component Value Units Date/Time    Protime-INR [[id number]]  (Normal) Collected: 03/16/22 1634    Lab Status: Final result Specimen: Blood from Arm, Right Updated: 03/16/22 1653     Protime 13 3 seconds      INR 1 05    APTT [[id number]]  (Normal) Collected: 03/16/22 1634    Lab Status: Final result Specimen: Blood from Arm, Right Updated: 03/16/22 1653     PTT 28 seconds     Basic metabolic panel [[id number]] Collected: 03/16/22 1634    Lab Status: Final result Specimen: Blood from Arm, Right Updated: 03/16/22 1651     Sodium 139 mmol/L      Potassium 3 5 mmol/L      Chloride 101 mmol/L      CO2 32 mmol/L      ANION GAP 6 mmol/L      BUN 17 mg/dL      Creatinine 0 78 mg/dL      Glucose 89 mg/dL      Calcium 9 0 mg/dL      eGFR 84 ml/min/1 73sq m     Narrative:      Brandy guidelines for Chronic Kidney Disease (CKD):     Stage 1 with normal or high GFR (GFR > 90 mL/min/1 73 square meters)    Stage 2 Mild CKD (GFR = 60-89 mL/min/1 73 square meters)    Stage 3A Moderate CKD (GFR = 45-59 mL/min/1 73 square meters)    Stage 3B Moderate CKD (GFR = 30-44 mL/min/1 73 square meters)    Stage 4 Severe CKD (GFR = 15-29 mL/min/1 73 square meters)    Stage 5 End Stage CKD (GFR <15 mL/min/1 73 square meters)  Note: GFR calculation is accurate only with a steady state creatinine    CBC and differential [[id number]]  (Abnormal) Collected: 03/16/22 1634    Lab Status: Final result Specimen: Blood from Arm, Right Updated: 03/16/22 1644     WBC 7 90 Thousand/uL      RBC 4 82 Million/uL      Hemoglobin 14 3 g/dL      Hematocrit 42 3 %      MCV 88 fL      MCH 29 7 pg      MCHC 33 8 g/dL      RDW 12 5 %      MPV 11 5 fL      Platelets 251 Thousands/uL      nRBC 0 /100 WBCs      Neutrophils Relative 79 %      Immat GRANS % 0 %      Lymphocytes Relative 14 %      Monocytes Relative 5 %      Eosinophils Relative 1 %      Basophils Relative 1 %      Neutrophils Absolute 6 27 Thousands/µL      Immature Grans Absolute 0 01 Thousand/uL      Lymphocytes Absolute 1 11 Thousands/µL      Monocytes Absolute 0 42 Thousand/µL      Eosinophils Absolute 0 05 Thousand/µL      Basophils Absolute 0 04 Thousands/µL                  CT lower extremity wo contrast left   Final Result by Pete Hunter DO (03/16 1657)      Minimally displaced distal femoral fracture, which appears to extend to the trochlear component of the patellofemoral arthroplasty  There is no significant displacement of the trochlear component  Workstation performed: [id number]         XR knee 4+ vw left injury   Final Result by Candy Jorge MD (03/16 1633)      Distal left femoral fracture identified  A CT of the left knee is pending for further characterization            Workstation performed: [id number]         XR tibia fibula 2 views LEFT   Final Result by Candy Jorge MD (03/16 3134)      No evidence of tibial or fibular fracture            Workstation performed: [id number]                    Procedures  Procedures         ED Course  ED Course as of 03/16/22 2002   Wed Mar 16, 2022   1619 Spoke with Dr Audelia Snow from ortho- recommends getting a CT knee    1 Spoke with Dr uAdelia Snow who states that patient can be discharged with crutches, knee immobilizer, and follow up with ortho as outpatient  The office will contact her tomorrow for pain for OR Monday if able or follow up in office Tuesday with Dr Arianne Lovett   Patient agrees with plan and will be provided with ortho information to call tomorrow as well  SBIRT 20yo+      Most Recent Value   SBIRT (22 yo +)    In order to provide better care to our patients, we are screening all of our patients for alcohol and drug use  Would it be okay to ask you these screening questions? Yes Filed at: 03/16/2022 1445   Initial Alcohol Screen: US AUDIT-C     1  How often do you have a drink containing alcohol? 0 Filed at: 03/16/2022 1445   2  How many drinks containing alcohol do you have on a typical day you are drinking? 0 Filed at: 03/16/2022 1445   3b  FEMALE Any Age, or MALE 65+: How often do you have 4 or more drinks on one occassion? 0 Filed at: 03/16/2022 1445   Audit-C Score 0 Filed at: 03/16/2022 1445   MERRITT: How many times in the past year have you    Used an illegal drug or used a prescription medication for non-medical reasons? Never Filed at: 03/16/2022 1445                    MDM  Number of Diagnoses or Management Options  Closed fracture of left distal femur [facility]): new and requires workup  Diagnosis management comments: Patient with L knee- will get xrays and reassess  Patient offered pain meds but states that she does not want any at this time  Patient reevaluated and feels improved  Patient updated on results of tests  Discharge instructions given including medications, follow-up, and return precautions  Patient demonstrates verbal understanding and agrees with plan   Narcotic precautions given        Amount and/or Complexity of Data Reviewed  Clinical lab tests: ordered and reviewed  Tests in the radiology section of CPT®: ordered and reviewed  Tests in the medicine section of CPT®: ordered and reviewed  Discussion of test results with the performing providers: yes  Decide to obtain previous medical records or to obtain history from someone other than the patient: yes  Obtain history from someone other than the patient: yes  Review and summarize past medical records: yes  Discuss the patient with other providers: yes  Independent visualization of images, tracings, or specimens: yes    Patient Progress  Patient progress: improved      Disposition  Final diagnoses:   Closed fracture of left distal femur ([address] )     Time reflects when diagnosis was documented in both MDM as applicable and the Disposition within this note     Time User Action Codes Description Comment    3/16/2022  6:25 PM Any VERA Add [C71 542P] Closed fracture of left distal femur [facility])       ED Disposition     ED Disposition Condition Date/Time Comment    Discharge Stable Wed Mar 16, 2022  6:24 PM Jose Enrique Choi discharge to home/self care              Follow-up Information     Follow up With Specialties Details Why Contact Info Additional Kingsley Wesley MD Orthopedic Surgery Call in 1 day for follow up next week [address] [facility]  [address] [place] [address] Emergency Department Emergency Medicine Go to  immediately for any new or worsening symptoms [address] [facility]  [address] [facility] [address] [facility] Emergency Department, [address] [place] [address], [facility]          Discharge Medication List as of 3/16/2022  6:37 PM      START taking these medications    Details   oxyCODONE-acetaminophen (Percocet) 5-325 mg per tablet Take 1 tablet by mouth every 4 (four) hours as needed for moderate pain for up to 10 days Max Daily Amount: 6 tablets, Starting Wed 3/16/2022, Until Sat 3/26/2022 at 2359, Normal         CONTINUE these medications which have NOT CHANGED    Details   Ascorbic Acid (VITAMIN C ADULT GUMMIES PO) Take 500 mg by mouth daily, Historical Med      cholecalciferol (VITAMIN D3) 400 units tablet Take 400 Units by mouth daily, Historical Med      Multiple Vitamins-Minerals (MULTIVITAMIN GUMMIES ADULT PO) Take by mouth daily, Historical Med      naproxen sodium (ALEVE) 220 MG tablet Take 220 mg by mouth once as needed , Historical Med      rizatriptan (MAXALT) 5 MG tablet Take 5 mg by mouth once as needed for migraine May repeat in 2 hours if needed, Historical Med      topiramate (TOPAMAX) 25 mg tablet Take 1 tablet (25 mg total) by mouth 2 (two) times a day, Starting Wed 9/8/2021, Normal      vitamin E, tocopherol, 1,000 units capsule Take 1,000 Units by mouth daily, Historical Med             No discharge procedures on file      PDMP Review     None          ED Provider  Electronically Signed by           Rebecca Ortiz DO  03/16/22 2002

## 2022-03-17 ENCOUNTER — TELEPHONE (OUTPATIENT)
Dept: OBGYN CLINIC | Facility: HOSPITAL | Age: 57
End: 2022-03-17

## 2022-03-17 ENCOUNTER — PREP FOR PROCEDURE (OUTPATIENT)
Dept: OBGYN CLINIC | Facility: CLINIC | Age: 57
End: 2022-03-17

## 2022-03-17 ENCOUNTER — OFFICE VISIT (OUTPATIENT)
Dept: LAB | Facility: HOSPITAL | Age: 57
End: 2022-03-17
Payer: COMMERCIAL

## 2022-03-17 ENCOUNTER — TELEPHONE (OUTPATIENT)
Dept: OBGYN CLINIC | Facility: CLINIC | Age: 57
End: 2022-03-17

## 2022-03-17 DIAGNOSIS — S72.492A OTHER CLOSED FRACTURE OF DISTAL END OF LEFT FEMUR, INITIAL ENCOUNTER (HCC): Primary | ICD-10-CM

## 2022-03-17 DIAGNOSIS — S72.492A OTHER CLOSED FRACTURE OF DISTAL END OF LEFT FEMUR, INITIAL ENCOUNTER (HCC): ICD-10-CM

## 2022-03-17 LAB
ATRIAL RATE: 69 BPM
P AXIS: 57 DEGREES
PR INTERVAL: 138 MS
QRS AXIS: 54 DEGREES
QRSD INTERVAL: 92 MS
QT INTERVAL: 412 MS
QTC INTERVAL: 441 MS
T WAVE AXIS: 58 DEGREES
VENTRICULAR RATE: 69 BPM

## 2022-03-17 PROCEDURE — 93010 ELECTROCARDIOGRAM REPORT: CPT | Performed by: INTERNAL MEDICINE

## 2022-03-17 PROCEDURE — 93005 ELECTROCARDIOGRAM TRACING: CPT

## 2022-03-17 RX ORDER — CHLORHEXIDINE GLUCONATE 0.12 MG/ML
15 RINSE ORAL ONCE
Status: CANCELLED | OUTPATIENT
Start: 2022-03-17 | End: 2022-03-17

## 2022-03-17 NOTE — TELEPHONE ENCOUNTER
Force on request sent to [facility],  Please advise if the following patient can be forced onto the schedule:    Patient: Judi Lara    : [phone]    MRN: [id number]    Call back #:     Insurance: F[employer]    Reason for appointment:left knee oblique fracture of the distal femur, slightly displaced as seen on the oblique view  Nurse requesting force on tomorrow, 3/18 with Dr Bucky Cooper  Requested doctor/location: Bucky Cooper      Thank you

## 2022-03-17 NOTE — TELEPHONE ENCOUNTER
Patient scheduled for SX w/ Dr Isi Sherwood  Per Sx coordinator, patient does not pre surgical appt     Thank you!

## 2022-03-17 NOTE — TELEPHONE ENCOUNTER
Called pt to set up surgery patient already had lab work done and will have EKG done today or tomorrow at [facility]     PO was scheduled     Patient is aware of surgery date of 03/21/22

## 2022-03-17 NOTE — TELEPHONE ENCOUNTER
Good Afternoon,    Does patient needs to see DR CARDENAS [facility] prior to surgery on 03 21?? Or was pre surgical paperwork/cleareance taken care of?      Thank you

## 2022-03-18 LAB
ABO GROUP BLD: NORMAL
ABO GROUP BLD: NORMAL
BLD GP AB SCN SERPL QL: NEGATIVE
RH BLD: POSITIVE
RH BLD: POSITIVE
SPECIMEN EXPIRATION DATE: NORMAL

## 2022-03-18 NOTE — PRE-PROCEDURE INSTRUCTIONS
Pre-Surgery Instructions:   Medication Instructions    Ascorbic Acid (VITAMIN C ADULT GUMMIES PO) Instructed patient per Anesthesia Guidelines   cholecalciferol (VITAMIN D3) 400 units tablet Instructed patient per Anesthesia Guidelines   Multiple Vitamins-Minerals (MULTIVITAMIN GUMMIES ADULT PO) Instructed patient per Anesthesia Guidelines   naproxen sodium (ALEVE) 220 MG tablet Instructed patient per Anesthesia Guidelines   oxyCODONE-acetaminophen (Percocet) 5-325 mg per tablet Instructed patient to continue taking as prescribed up to and including DOS with sips of water   rizatriptan (MAXALT) 5 MG tablet Instructed patient to continue taking as prescribed up to and including DOS with sips of water   topiramate (TOPAMAX) 25 mg tablet Instructed patient to continue taking as prescribed up to and including DOS with sips of water   vitamin E, tocopherol, 1,000 units capsule Instructed patient per Anesthesia Guidelines  Med list reviewed as above  Also instructed pt not to start any new vitamins/supplements preoperatively and to avoid NSAID's  3 days prior to surgery  Tylenol is acceptable if needed  Pt has and/or is getting CHG surgical soap and verbalizes understanding of preoperative showering protocol  [facility] and pt understands that it may change at any time  All information within "My Surgical Experience" pamphlet reviewed and patient verbalizes understanding and compliance  All questions answered

## 2022-03-20 ENCOUNTER — ANESTHESIA EVENT (OUTPATIENT)
Dept: PERIOP | Facility: HOSPITAL | Age: 57
End: 2022-03-20
Payer: COMMERCIAL

## 2022-03-21 ENCOUNTER — APPOINTMENT (OUTPATIENT)
Dept: RADIOLOGY | Facility: HOSPITAL | Age: 57
End: 2022-03-21
Payer: COMMERCIAL

## 2022-03-21 ENCOUNTER — ANESTHESIA (OUTPATIENT)
Dept: PERIOP | Facility: HOSPITAL | Age: 57
End: 2022-03-21
Payer: COMMERCIAL

## 2022-03-21 ENCOUNTER — HOSPITAL ENCOUNTER (OUTPATIENT)
Facility: HOSPITAL | Age: 57
Setting detail: OUTPATIENT SURGERY
Discharge: HOME | End: 2022-03-22
Attending: ORTHOPAEDIC SURGERY | Admitting: ORTHOPAEDIC SURGERY
Payer: COMMERCIAL

## 2022-03-21 DIAGNOSIS — R09.02 HYPOXIA: ICD-10-CM

## 2022-03-21 DIAGNOSIS — S72.402D CLOSED FRACTURE OF DISTAL END OF LEFT FEMUR WITH ROUTINE HEALING, UNSPECIFIED FRACTURE MORPHOLOGY, SUBSEQUENT ENCOUNTER: Primary | ICD-10-CM

## 2022-03-21 PROBLEM — G43.909 MIGRAINE: Status: ACTIVE | Noted: 2022-03-21

## 2022-03-21 PROBLEM — Z98.890 POSTOPERATIVE HYPOXIA: Status: ACTIVE | Noted: 2022-03-21

## 2022-03-21 PROBLEM — G89.18 POSTOPERATIVE PAIN: Status: ACTIVE | Noted: 2022-03-21

## 2022-03-21 LAB
ANION GAP SERPL CALCULATED.3IONS-SCNC: 7 MMOL/L (ref 4–13)
BASOPHILS # BLD MANUAL: 0 THOUSAND/UL (ref 0–0.1)
BASOPHILS NFR MAR MANUAL: 0 % (ref 0–1)
BUN SERPL-MCNC: 15 MG/DL (ref 5–25)
CALCIUM SERPL-MCNC: 8.6 MG/DL (ref 8.3–10.1)
CHLORIDE SERPL-SCNC: 103 MMOL/L (ref 100–108)
CO2 SERPL-SCNC: 29 MMOL/L (ref 21–32)
CREAT SERPL-MCNC: 0.84 MG/DL (ref 0.6–1.3)
EOSINOPHIL # BLD MANUAL: 0 THOUSAND/UL (ref 0–0.4)
EOSINOPHIL NFR BLD MANUAL: 0 % (ref 0–6)
ERYTHROCYTE [DISTWIDTH] IN BLOOD BY AUTOMATED COUNT: 13.2 % (ref 11.6–15.1)
GFR SERPL CREATININE-BSD FRML MDRD: 77 ML/MIN/1.73SQ M
GLUCOSE P FAST SERPL-MCNC: 144 MG/DL (ref 65–99)
GLUCOSE SERPL-MCNC: 144 MG/DL (ref 65–140)
HCT VFR BLD AUTO: 36 % (ref 34.8–46.1)
HGB BLD-MCNC: 11.4 G/DL (ref 11.5–15.4)
LYMPHOCYTES # BLD AUTO: 0.42 THOUSAND/UL (ref 0.6–4.47)
LYMPHOCYTES # BLD AUTO: 7 % (ref 14–44)
MCH RBC QN AUTO: 30 PG (ref 26.8–34.3)
MCHC RBC AUTO-ENTMCNC: 31.7 G/DL (ref 31.4–37.4)
MCV RBC AUTO: 95 FL (ref 82–98)
MONOCYTES # BLD AUTO: 0.06 THOUSAND/UL (ref 0–1.22)
MONOCYTES NFR BLD: 1 % (ref 4–12)
NEUTROPHILS # BLD MANUAL: 5.57 THOUSAND/UL (ref 1.85–7.62)
NEUTS SEG NFR BLD AUTO: 92 % (ref 43–75)
NT-PROBNP SERPL-MCNC: 253 PG/ML
PLATELET # BLD AUTO: 197 THOUSANDS/UL (ref 149–390)
PLATELET BLD QL SMEAR: ADEQUATE
PMV BLD AUTO: 11.4 FL (ref 8.9–12.7)
POTASSIUM SERPL-SCNC: 4.5 MMOL/L (ref 3.5–5.3)
RBC # BLD AUTO: 3.8 MILLION/UL (ref 3.81–5.12)
SODIUM SERPL-SCNC: 139 MMOL/L (ref 136–145)
WBC # BLD AUTO: 6.05 THOUSAND/UL (ref 4.31–10.16)

## 2022-03-21 PROCEDURE — C1713 ANCHOR/SCREW BN/BN,TIS/BN: HCPCS | Performed by: ORTHOPAEDIC SURGERY

## 2022-03-21 PROCEDURE — NC001 PR NO CHARGE: Performed by: ORTHOPAEDIC SURGERY

## 2022-03-21 PROCEDURE — 27514 TREATMENT OF THIGH FRACTURE: CPT | Performed by: ORTHOPAEDIC SURGERY

## 2022-03-21 PROCEDURE — 99245 OFF/OP CONSLTJ NEW/EST HI 55: CPT | Performed by: INTERNAL MEDICINE

## 2022-03-21 PROCEDURE — 27514 TREATMENT OF THIGH FRACTURE: CPT | Performed by: PHYSICIAN ASSISTANT

## 2022-03-21 PROCEDURE — 80048 BASIC METABOLIC PNL TOTAL CA: CPT | Performed by: INTERNAL MEDICINE

## 2022-03-21 PROCEDURE — 71045 X-RAY EXAM CHEST 1 VIEW: CPT

## 2022-03-21 PROCEDURE — 83880 ASSAY OF NATRIURETIC PEPTIDE: CPT | Performed by: INTERNAL MEDICINE

## 2022-03-21 PROCEDURE — 85027 COMPLETE CBC AUTOMATED: CPT | Performed by: INTERNAL MEDICINE

## 2022-03-21 PROCEDURE — 73560 X-RAY EXAM OF KNEE 1 OR 2: CPT

## 2022-03-21 PROCEDURE — 85007 BL SMEAR W/DIFF WBC COUNT: CPT | Performed by: INTERNAL MEDICINE

## 2022-03-21 PROCEDURE — C1769 GUIDE WIRE: HCPCS | Performed by: ORTHOPAEDIC SURGERY

## 2022-03-21 DEVICE — 3.5MM LOCKING SCREW SLF-TPNG W/STARDRIVE(TM) RECESS 24MM: Type: IMPLANTABLE DEVICE | Site: LEG | Status: FUNCTIONAL

## 2022-03-21 DEVICE — 3.5MM LOCKING SCREW SLF-TPNG W/STARDRIVE(TM) RECESS 38MM: Type: IMPLANTABLE DEVICE | Site: LEG | Status: FUNCTIONAL

## 2022-03-21 DEVICE — 3.5MM LOCKING SCREW SLF-TPNG W/STARDRIVE RECESS 42MM: Type: IMPLANTABLE DEVICE | Site: LEG | Status: FUNCTIONAL

## 2022-03-21 DEVICE — 3.5MM LCP PLATE 8 HOLES 111MM
Type: IMPLANTABLE DEVICE | Site: LEG | Status: FUNCTIONAL
Brand: LCP

## 2022-03-21 DEVICE — 3.5MM CORTEX SCREW SELF-TAPPING 34MM: Type: IMPLANTABLE DEVICE | Site: LEG | Status: FUNCTIONAL

## 2022-03-21 DEVICE — 4.5MM CANNULATED SCREW PARTIALLY THREADED/36MM: Type: IMPLANTABLE DEVICE | Site: LEG | Status: FUNCTIONAL

## 2022-03-21 DEVICE — 4.5MM CANNULATED SCREW PARTIALLY THREADED/72MM: Type: IMPLANTABLE DEVICE | Site: LEG | Status: FUNCTIONAL

## 2022-03-21 RX ORDER — CHLORHEXIDINE GLUCONATE 0.12 MG/ML
15 RINSE ORAL ONCE
Status: COMPLETED | OUTPATIENT
Start: 2022-03-21 | End: 2022-03-21

## 2022-03-21 RX ORDER — TOPIRAMATE 25 MG/1
25 TABLET ORAL 2 TIMES DAILY
Status: DISCONTINUED | OUTPATIENT
Start: 2022-03-21 | End: 2022-03-22 | Stop reason: HOSPADM

## 2022-03-21 RX ORDER — HYDROMORPHONE HCL/PF 1 MG/ML
0.5 SYRINGE (ML) INJECTION
Status: DISCONTINUED | OUTPATIENT
Start: 2022-03-21 | End: 2022-03-21 | Stop reason: HOSPADM

## 2022-03-21 RX ORDER — LABETALOL 20 MG/4 ML (5 MG/ML) INTRAVENOUS SYRINGE
5
Status: DISCONTINUED | OUTPATIENT
Start: 2022-03-21 | End: 2022-03-21 | Stop reason: HOSPADM

## 2022-03-21 RX ORDER — LIDOCAINE HYDROCHLORIDE 10 MG/ML
0.5 INJECTION, SOLUTION EPIDURAL; INFILTRATION; INTRACAUDAL; PERINEURAL ONCE AS NEEDED
Status: DISCONTINUED | OUTPATIENT
Start: 2022-03-21 | End: 2022-03-21 | Stop reason: HOSPADM

## 2022-03-21 RX ORDER — ACETAMINOPHEN 325 MG/1
650 TABLET ORAL EVERY 6 HOURS SCHEDULED
Status: DISCONTINUED | OUTPATIENT
Start: 2022-03-21 | End: 2022-03-22 | Stop reason: HOSPADM

## 2022-03-21 RX ORDER — PROMETHAZINE HYDROCHLORIDE 25 MG/ML
12.5 INJECTION, SOLUTION INTRAMUSCULAR; INTRAVENOUS ONCE AS NEEDED
Status: COMPLETED | OUTPATIENT
Start: 2022-03-21 | End: 2022-03-21

## 2022-03-21 RX ORDER — ONDANSETRON 2 MG/ML
INJECTION INTRAMUSCULAR; INTRAVENOUS AS NEEDED
Status: DISCONTINUED | OUTPATIENT
Start: 2022-03-21 | End: 2022-03-21

## 2022-03-21 RX ORDER — ONDANSETRON 2 MG/ML
4 INJECTION INTRAMUSCULAR; INTRAVENOUS ONCE AS NEEDED
Status: COMPLETED | OUTPATIENT
Start: 2022-03-21 | End: 2022-03-21

## 2022-03-21 RX ORDER — MAGNESIUM HYDROXIDE 1200 MG/15ML
LIQUID ORAL AS NEEDED
Status: DISCONTINUED | OUTPATIENT
Start: 2022-03-21 | End: 2022-03-21 | Stop reason: HOSPADM

## 2022-03-21 RX ORDER — HYDROMORPHONE HCL IN WATER/PF 6 MG/30 ML
0.2 PATIENT CONTROLLED ANALGESIA SYRINGE INTRAVENOUS
Status: DISCONTINUED | OUTPATIENT
Start: 2022-03-21 | End: 2022-03-22

## 2022-03-21 RX ORDER — SODIUM CHLORIDE, SODIUM LACTATE, POTASSIUM CHLORIDE, CALCIUM CHLORIDE 600; 310; 30; 20 MG/100ML; MG/100ML; MG/100ML; MG/100ML
125 INJECTION, SOLUTION INTRAVENOUS CONTINUOUS
Status: DISCONTINUED | OUTPATIENT
Start: 2022-03-21 | End: 2022-03-21 | Stop reason: HOSPADM

## 2022-03-21 RX ORDER — MEPERIDINE HYDROCHLORIDE 50 MG/ML
12.5 INJECTION INTRAMUSCULAR; INTRAVENOUS; SUBCUTANEOUS ONCE
Status: COMPLETED | OUTPATIENT
Start: 2022-03-21 | End: 2022-03-21

## 2022-03-21 RX ORDER — FENTANYL CITRATE/PF 50 MCG/ML
25 SYRINGE (ML) INJECTION
Status: DISCONTINUED | OUTPATIENT
Start: 2022-03-21 | End: 2022-03-21 | Stop reason: HOSPADM

## 2022-03-21 RX ORDER — ASCORBIC ACID 500 MG
500 TABLET ORAL DAILY
Status: DISCONTINUED | OUTPATIENT
Start: 2022-03-22 | End: 2022-03-22 | Stop reason: HOSPADM

## 2022-03-21 RX ORDER — RIZATRIPTAN BENZOATE 5 MG/1
5 TABLET ORAL ONCE AS NEEDED
Status: DISCONTINUED | OUTPATIENT
Start: 2022-03-21 | End: 2022-03-22 | Stop reason: HOSPADM

## 2022-03-21 RX ORDER — ONDANSETRON 2 MG/ML
4 INJECTION INTRAMUSCULAR; INTRAVENOUS EVERY 6 HOURS PRN
Status: DISCONTINUED | OUTPATIENT
Start: 2022-03-21 | End: 2022-03-22 | Stop reason: HOSPADM

## 2022-03-21 RX ORDER — DOCUSATE SODIUM 100 MG/1
100 CAPSULE, LIQUID FILLED ORAL 2 TIMES DAILY
Status: DISCONTINUED | OUTPATIENT
Start: 2022-03-21 | End: 2022-03-22 | Stop reason: HOSPADM

## 2022-03-21 RX ORDER — OXYCODONE HYDROCHLORIDE 10 MG/1
10 TABLET ORAL EVERY 4 HOURS PRN
Status: DISCONTINUED | OUTPATIENT
Start: 2022-03-21 | End: 2022-03-22 | Stop reason: HOSPADM

## 2022-03-21 RX ORDER — TRAMADOL HYDROCHLORIDE 50 MG/1
50 TABLET ORAL EVERY 6 HOURS SCHEDULED
Status: CANCELLED | OUTPATIENT
Start: 2022-03-21

## 2022-03-21 RX ORDER — CEFAZOLIN SODIUM 2 G/50ML
2000 SOLUTION INTRAVENOUS EVERY 8 HOURS
Status: COMPLETED | OUTPATIENT
Start: 2022-03-21 | End: 2022-03-22

## 2022-03-21 RX ORDER — SODIUM CHLORIDE, SODIUM LACTATE, POTASSIUM CHLORIDE, CALCIUM CHLORIDE 600; 310; 30; 20 MG/100ML; MG/100ML; MG/100ML; MG/100ML
100 INJECTION, SOLUTION INTRAVENOUS CONTINUOUS
Status: DISCONTINUED | OUTPATIENT
Start: 2022-03-21 | End: 2022-03-22 | Stop reason: HOSPADM

## 2022-03-21 RX ORDER — HYDROMORPHONE HCL 110MG/55ML
PATIENT CONTROLLED ANALGESIA SYRINGE INTRAVENOUS AS NEEDED
Status: DISCONTINUED | OUTPATIENT
Start: 2022-03-21 | End: 2022-03-21

## 2022-03-21 RX ORDER — ALBUTEROL SULFATE 2.5 MG/3ML
2.5 SOLUTION RESPIRATORY (INHALATION) ONCE AS NEEDED
Status: DISCONTINUED | OUTPATIENT
Start: 2022-03-21 | End: 2022-03-21 | Stop reason: HOSPADM

## 2022-03-21 RX ORDER — PROPOFOL 10 MG/ML
INJECTION, EMULSION INTRAVENOUS AS NEEDED
Status: DISCONTINUED | OUTPATIENT
Start: 2022-03-21 | End: 2022-03-21

## 2022-03-21 RX ORDER — CEFAZOLIN SODIUM 2 G/50ML
2000 SOLUTION INTRAVENOUS ONCE
Status: COMPLETED | OUTPATIENT
Start: 2022-03-21 | End: 2022-03-21

## 2022-03-21 RX ORDER — FENTANYL CITRATE 50 UG/ML
INJECTION, SOLUTION INTRAMUSCULAR; INTRAVENOUS AS NEEDED
Status: DISCONTINUED | OUTPATIENT
Start: 2022-03-21 | End: 2022-03-21

## 2022-03-21 RX ORDER — ACETAMINOPHEN 325 MG/1
650 TABLET ORAL EVERY 6 HOURS PRN
Status: CANCELLED | OUTPATIENT
Start: 2022-03-21

## 2022-03-21 RX ORDER — MIDAZOLAM HYDROCHLORIDE 2 MG/2ML
INJECTION, SOLUTION INTRAMUSCULAR; INTRAVENOUS AS NEEDED
Status: DISCONTINUED | OUTPATIENT
Start: 2022-03-21 | End: 2022-03-21

## 2022-03-21 RX ORDER — LIDOCAINE HYDROCHLORIDE 10 MG/ML
INJECTION, SOLUTION EPIDURAL; INFILTRATION; INTRACAUDAL; PERINEURAL AS NEEDED
Status: DISCONTINUED | OUTPATIENT
Start: 2022-03-21 | End: 2022-03-21

## 2022-03-21 RX ORDER — ONDANSETRON 2 MG/ML
4 INJECTION INTRAMUSCULAR; INTRAVENOUS EVERY 6 HOURS PRN
Status: CANCELLED | OUTPATIENT
Start: 2022-03-21

## 2022-03-21 RX ORDER — ROPIVACAINE HYDROCHLORIDE 5 MG/ML
INJECTION, SOLUTION EPIDURAL; INFILTRATION; PERINEURAL
Status: COMPLETED | OUTPATIENT
Start: 2022-03-21 | End: 2022-03-21

## 2022-03-21 RX ORDER — OXYCODONE HYDROCHLORIDE 5 MG/1
5 TABLET ORAL EVERY 4 HOURS PRN
Status: DISCONTINUED | OUTPATIENT
Start: 2022-03-21 | End: 2022-03-22 | Stop reason: HOSPADM

## 2022-03-21 RX ADMIN — FENTANYL CITRATE 25 MCG: 50 INJECTION, SOLUTION INTRAMUSCULAR; INTRAVENOUS at 14:11

## 2022-03-21 RX ADMIN — TOPIRAMATE 25 MG: 25 TABLET, FILM COATED ORAL at 18:56

## 2022-03-21 RX ADMIN — LABETALOL HYDROCHLORIDE 5 MG: 5 INJECTION, SOLUTION INTRAVENOUS at 16:13

## 2022-03-21 RX ADMIN — HYDROMORPHONE HYDROCHLORIDE 0.2 MG: 0.2 INJECTION, SOLUTION INTRAMUSCULAR; INTRAVENOUS; SUBCUTANEOUS at 22:19

## 2022-03-21 RX ADMIN — ONDANSETRON 4 MG: 2 INJECTION INTRAMUSCULAR; INTRAVENOUS at 15:34

## 2022-03-21 RX ADMIN — CEFAZOLIN SODIUM 2000 MG: 2 SOLUTION INTRAVENOUS at 22:11

## 2022-03-21 RX ADMIN — FENTANYL CITRATE 25 MCG: 50 INJECTION, SOLUTION INTRAMUSCULAR; INTRAVENOUS at 14:29

## 2022-03-21 RX ADMIN — MEPERIDINE HYDROCHLORIDE 12.5 MG: 50 INJECTION INTRAMUSCULAR; INTRAVENOUS; SUBCUTANEOUS at 16:07

## 2022-03-21 RX ADMIN — SODIUM CHLORIDE, SODIUM LACTATE, POTASSIUM CHLORIDE, AND CALCIUM CHLORIDE 100 ML/HR: .6; .31; .03; .02 INJECTION, SOLUTION INTRAVENOUS at 22:23

## 2022-03-21 RX ADMIN — ROPIVACAINE HYDROCHLORIDE 20 ML: 5 INJECTION, SOLUTION EPIDURAL; INFILTRATION; PERINEURAL at 12:35

## 2022-03-21 RX ADMIN — CEFAZOLIN SODIUM 2000 MG: 2 SOLUTION INTRAVENOUS at 12:58

## 2022-03-21 RX ADMIN — PROPOFOL 150 MG: 10 INJECTION, EMULSION INTRAVENOUS at 13:09

## 2022-03-21 RX ADMIN — FENTANYL CITRATE 50 MCG: 50 INJECTION, SOLUTION INTRAMUSCULAR; INTRAVENOUS at 13:18

## 2022-03-21 RX ADMIN — FENTANYL CITRATE 25 MCG: 50 INJECTION, SOLUTION INTRAMUSCULAR; INTRAVENOUS at 13:54

## 2022-03-21 RX ADMIN — DOCUSATE SODIUM 100 MG: 100 CAPSULE, LIQUID FILLED ORAL at 18:55

## 2022-03-21 RX ADMIN — PROMETHAZINE HYDROCHLORIDE 12.5 MG: 25 INJECTION INTRAMUSCULAR; INTRAVENOUS at 17:07

## 2022-03-21 RX ADMIN — FENTANYL CITRATE 25 MCG: 50 INJECTION, SOLUTION INTRAMUSCULAR; INTRAVENOUS at 13:57

## 2022-03-21 RX ADMIN — ACETAMINOPHEN 650 MG: 325 TABLET, FILM COATED ORAL at 18:55

## 2022-03-21 RX ADMIN — SODIUM CHLORIDE, SODIUM LACTATE, POTASSIUM CHLORIDE, AND CALCIUM CHLORIDE: .6; .31; .03; .02 INJECTION, SOLUTION INTRAVENOUS at 15:44

## 2022-03-21 RX ADMIN — HYDROMORPHONE HYDROCHLORIDE 0.5 MG: 2 INJECTION, SOLUTION INTRAMUSCULAR; INTRAVENOUS; SUBCUTANEOUS at 15:04

## 2022-03-21 RX ADMIN — FENTANYL CITRATE 25 MCG: 50 INJECTION, SOLUTION INTRAMUSCULAR; INTRAVENOUS at 13:36

## 2022-03-21 RX ADMIN — MIDAZOLAM HYDROCHLORIDE 2 MG: 1 INJECTION, SOLUTION INTRAMUSCULAR; INTRAVENOUS at 12:28

## 2022-03-21 RX ADMIN — HYDROMORPHONE HYDROCHLORIDE 0.5 MG: 2 INJECTION, SOLUTION INTRAMUSCULAR; INTRAVENOUS; SUBCUTANEOUS at 15:49

## 2022-03-21 RX ADMIN — SODIUM CHLORIDE, SODIUM LACTATE, POTASSIUM CHLORIDE, AND CALCIUM CHLORIDE 100 ML/HR: .6; .31; .03; .02 INJECTION, SOLUTION INTRAVENOUS at 19:01

## 2022-03-21 RX ADMIN — FENTANYL CITRATE 25 MCG: 50 INJECTION, SOLUTION INTRAMUSCULAR; INTRAVENOUS at 16:49

## 2022-03-21 RX ADMIN — LIDOCAINE HYDROCHLORIDE 50 MG: 10 INJECTION, SOLUTION EPIDURAL; INFILTRATION; INTRACAUDAL; PERINEURAL at 13:07

## 2022-03-21 RX ADMIN — CHLORHEXIDINE GLUCONATE 0.12% ORAL RINSE 15 ML: 1.2 LIQUID ORAL at 12:10

## 2022-03-21 RX ADMIN — FENTANYL CITRATE 25 MCG: 50 INJECTION, SOLUTION INTRAMUSCULAR; INTRAVENOUS at 14:17

## 2022-03-21 RX ADMIN — SODIUM CHLORIDE, SODIUM LACTATE, POTASSIUM CHLORIDE, AND CALCIUM CHLORIDE 125 ML/HR: .6; .31; .03; .02 INJECTION, SOLUTION INTRAVENOUS at 12:14

## 2022-03-21 RX ADMIN — ONDANSETRON 4 MG: 2 INJECTION INTRAMUSCULAR; INTRAVENOUS at 16:20

## 2022-03-21 NOTE — H&P
Orthopedics - H&P  Mena Milan 61 y o  female MRN: [id number]  Unit/Bed#: APU 4      Chief Complaint:   Left thigh pain    HPI:  61 y o  female who sustained fall down stairs resulting in distal femur fracture  She denies motor/sensory deficits  She localizes the pain to the area of fracture without radiation proximally or distally  She is a nonsmoker   She presents today for fixation of injury    Review Of Systems:   · Skin: Normal  · Neuro: See HPI  · Musculoskeletal: See HPI  · 14 point review of systems negative except as stated above     Past Medical History:   Past Medical History:   Diagnosis Date    Arthritis     Migraines        Past Surgical History:   Past Surgical History:   Procedure Laterality Date    CHOLECYSTECTOMY      GALLBLADDER SURGERY      JOINT REPLACEMENT      PATELLA SURGERY  2016    Patella replacement on left knee    REPLACEMENT TOTAL KNEE Left     TONSILECTOMY AND ADNOIDECTOMY  1971    Tonsilectomy only    TONSILLECTOMY      TUBAL LIGATION  1989       Family History:  Family history reviewed and non-contributory  Family History   Problem Relation Age of Onset    Dementia Mother     Alzheimer's disease Mother     Kidney disease Father     Hypertension Father     No Known Problems Sister     Hypertension Sister     Hyperlipidemia Sister     No Known Problems Brother     Heart attack Maternal Grandmother     Parkinsonism Maternal Grandfather     No Known Problems Paternal Grandmother     No Known Problems Paternal Grandfather     No Known Problems Brother     Scoliosis Son     Scoliosis Son     Anxiety disorder Son     Depression Son        Social History:  Social History     Socioeconomic History    Marital status:      Spouse name: None    Number of children: None    Years of education: None    Highest education level: None   Occupational History    None   Tobacco Use    Smoking status: Never Smoker    Smokeless tobacco: Never Used   Vaping Use  Vaping Use: Never used   Substance and Sexual Activity    Alcohol use: Not Currently    Drug use: Never    Sexual activity: None   Other Topics Concern    None   Social History Narrative    None     Social Determinants of Health     Financial Resource Strain: Not on file   Food Insecurity: Not on file   Transportation Needs: Not on file   Physical Activity: Not on file   Stress: Not on file   Social Connections: Not on file   Intimate Partner Violence: Not on file   Housing Stability: Not on file       Allergies:   No Known Allergies        Labs:  0   Lab Value Date/Time    HCT 42 3 03/16/2022 1634    HGB 14 3 03/16/2022 1634    INR 1 05 03/16/2022 1634    WBC 7 90 03/16/2022 1634       Meds:    Current Facility-Administered Medications:     ceFAZolin (ANCEF) IVPB (premix in dextrose) 2,000 mg 50 mL, 2,000 mg, Intravenous, Once, Clifford Bray MD    lactated ringers infusion, 125 mL/hr, Intravenous, Continuous, Geraldo Saavedra MD, Last Rate: 125 mL/hr at 03/21/22 1214, 125 mL/hr at 03/21/22 1214    lidocaine (PF) (XYLOCAINE-MPF) 1 % injection 0 5 mL, 0 5 mL, Infiltration, Once PRN, Geraldo Saavedra MD    Facility-Administered Medications Ordered in Other Encounters:     midazolam (VERSED) injection, , Intravenous, PRN, Geraldo Saavedra MD, 2 mg at 03/21/22 1228    Blood Culture:   No results found for: BLOODCX    Wound Culture:   No results found for: WOUNDCULT    Ins and Outs:  No intake/output data recorded  Physical Exam:   /83   Pulse 81   Temp (!) 97 4 °F (36 3 °C) (Temporal)   Resp 16   Ht 5' 4" (1 626 m)   Wt 65 3 kg (143 lb 15 4 oz)   SpO2 98%   BMI 24 71 kg/m²   Gen: Alert and oriented to person, place, time  HEENT: EOMI, eyes clear, moist mucus membranes, hearing intact  Respiratory: Bilateral chest rise   No audible wheezing found  Cardiovascular: extremities perfused, no audible murmurs  Abdomen: soft nontender/nondistended    Musculoskeletal: left lower extremity  · Skin closed at fracture  · TTP at fracture site  · SILT s/s/sp/dp/t    · +ankle df/pf, ehl/fhl motor functions  · 2+ DP pulse    Radiology:   I personally reviewed the films  Xrays and CT of left femur reveal displaced medial condylar fracture    _*_*_*_*_*_*_*_*_*_*_*_*_*_*_*_*_*_*_*_*_*_*_*_*_*_*_*_*_*_*_*_*_*_*_*_*_*_*_*_*_*    Assessment:  61 y  o female with displaced L distal femur medial condylar fracture    Plan:   · NWB   · NPO  · OR for fixation today    Informed consent obtained  Dispo: anticipate dc to home postop  Postop aspirin for dvt ppx    Nael Bhagat MD

## 2022-03-21 NOTE — ANESTHESIA PROCEDURE NOTES
Peripheral Block    Patient location during procedure: holding area  Start time: 3/21/2022 12:30 PM  Reason for block: at surgeon's request and post-op pain management  Staffing  Performed: Anesthesiologist   Anesthesiologist: Peterson Castaneda MD  Preanesthetic Checklist  Completed: patient identified, IV checked, site marked, risks and benefits discussed, surgical consent, monitors and equipment checked, pre-op evaluation and timeout performed  Peripheral Block  Patient position: supine  Prep: ChloraPrep  Patient monitoring: continuous pulse ox, frequent blood pressure checks, cardiac monitor and heart rate  Block type: adductor canal block  Laterality: left  Injection technique: single-shot  Procedures: ultrasound guided, Ultrasound guidance required for the procedure to increase accuracy and safety of medication placement and decrease risk of complications    Ultrasound permanent image savedropivacaine (NAROPIN) 0 5 % perineural infiltration, 20 mL  Needle  Needle type: Stimuplex   Needle gauge: 22 G  Needle length: 10 cm  Needle localization: ultrasound guidance  Needle insertion depth: 3 cm  Test dose: negative  Assessment  Injection assessment: incremental injection, local visualized surrounding nerve on ultrasound, negative aspiration for heme and no paresthesia on injection  Paresthesia pain: none  Heart rate change: no  Slow fractionated injection: yes  Post-procedure:  site cleaned  patient tolerated the procedure well with no immediate complications

## 2022-03-21 NOTE — ASSESSMENT & PLAN NOTE
Patient with postop pain  Multimodal pain control  Avoid excess of narcotics as it may worsen hypoventilation  Suggest Tylenol scheduled, gabapentin 100mg TID and uptitrate

## 2022-03-21 NOTE — ASSESSMENT & PLAN NOTE
Requiring up to 6L of O2 immediately postop  This appears to be worse with administration of pain medicines  Differentials for hypoxemia would include hypoventilation due to the narcotics, atelectasis, volume overload, PE either due to clot or due to fat embolus given long bone fracture and surgical procedure  In her case most likely she is under effect of the anesthesia and hypoventilating  Fortunately patient improving as far as oxygenation  Plan:  1  Continue O2 supplementation and titrating it to a saturation of 92%, no need for hyperoxia  2  Recommend obtainining a chest X ray which may help identify volume overload, atelectasis or infiltrates  3  Encourage incentive spirometry regardless of results above  4  Attempt to minimize narcotic use as patient exhibits hypoventilation with it  Would suggest multimodal pain control with Tylenol scheduled, Gabapentin  5  Recommend obtaining baseline CBC from today to check Hb (anemia may contribute to hypoxemia) and a BMP to check kidney function in case CTA needed  Also check a BNP  6  If patient remains hypoxic for a prolonged postop period (>12h) even if awake I would proceed with a chest CTA assuming her kidney function is stable to rule out PE, otherwise would recommend V/Q scan      Thank you for consultation

## 2022-03-21 NOTE — PROGRESS NOTES
[address]  Progress Note - Jose Tiwari 1965, 61 y o  female MRN: [id number]  Unit/Bed#: -Nicky Encounter: [id number]  Primary Care Provider: Eudora Heimlich, DO   Date and time admitted to hospital: 3/21/2022 11:46 AM    * Postoperative hypoxia  Assessment & Plan  Requiring up to 6L of O2 immediately postop  This appears to be worse with administration of pain medicines  Differentials for hypoxemia would include hypoventilation due to the narcotics, atelectasis, volume overload, PE either due to clot or due to fat embolus given long bone fracture and surgical procedure  In her case most likely she is under effect of the anesthesia and hypoventilating  Fortunately patient improving as far as oxygenation  Plan:  1  Continue O2 supplementation and titrating it to a saturation of 92%, no need for hyperoxia  2  Recommend obtainining a chest X ray which may help identify volume overload, atelectasis or infiltrates  3  Encourage incentive spirometry regardless of results above  4  Attempt to minimize narcotic use as patient exhibits hypoventilation with it  Would suggest multimodal pain control with Tylenol scheduled, Gabapentin  5  Recommend obtaining baseline CBC from today to check Hb (anemia may contribute to hypoxemia) and a BMP to check kidney function in case CTA needed  Also check a BNP  6  If patient remains hypoxic for a prolonged postop period (>12h) even if awake I would proceed with a chest CTA assuming her kidney function is stable to rule out PE, otherwise would recommend V/Q scan      Thank you for consultation      Postoperative pain  Assessment & Plan  Patient with postop pain  Multimodal pain control  Avoid excess of narcotics as it may worsen hypoventilation  Suggest Tylenol scheduled, gabapentin 100mg TID and uptitrate    Closed fracture of distal end of left femur with routine healing  Assessment & Plan  Status post open reduction and internal fixation  Migraine  Assessment & Plan  Previously on topiramate and triptans in the past         VTE Prophylaxis: Sequential compression device (Venodyne)   / sequential compression device     Recommendations for Discharge:  · As per Orthopedic Service    Counseling / Coordination of Care Time: 45 minutes  Greater than 50% of total time spent on patient counseling and coordination of care  Collaboration of Care: Were Recommendations Directly Discussed with Primary Treatment Team? - Yes     History of Present Illness:    Jose Enrique Choi is a 61 y o  female who is originally admitted to the orthopedics service due to distal femoral fracture  We are consulted for postoperative hypoxemia  Patient did have a fall from stairs about 10 steps less than a week ago and since then she has been having pain in her left leg, she was diagnosed with a distal left femur fracture and came today for elective correction  Patient is currently status post open reduction and internal fixation  Postoperatively patient apparently was fairly hypoxic requiring up to 6 L of oxygen, she was also noted to be hypoventilating with a respiratory rate around 10 at times  Patient is still not completely awake, somewhat obtunded with a GCS of 13, still under effects of anesthesia but can answer simple questions  Significant other at the bedside does endorse similar situation in the past in which patient did have a prolonged recover from anesthesia  Patient herself denies any chest pain, subjective shortness of breath, nausea vomiting or diarrhea  Her pain appears under control at this point  Patient denies any previous history of smoking, COPD or asthma, or any other pulmonary issues for that matter  Denies any history of blood clots  Review of Systems:    Review of Systems   Neurological:        Drowsiness   All other systems reviewed and are negative        Past Medical and Surgical History:     Past Medical History: Diagnosis Date    Arthritis     Migraines        Past Surgical History:   Procedure Laterality Date    CHOLECYSTECTOMY      GALLBLADDER SURGERY      JOINT REPLACEMENT      PATELLA SURGERY  2016    Patella replacement on left knee    REPLACEMENT TOTAL KNEE Left     TONSILECTOMY AND ADNOIDECTOMY  1971    Tonsilectomy only    TONSILLECTOMY      TUBAL LIGATION  1989       Meds/Allergies:    all medications and allergies reviewed    Allergies: No Known Allergies    Social History:     Marital Status:     Substance Use History:   Social History     Substance and Sexual Activity   Alcohol Use Not Currently     Social History     Tobacco Use   Smoking Status Never Smoker   Smokeless Tobacco Never Used     Social History     Substance and Sexual Activity   Drug Use Never       Family History:    non-contributory    Physical Exam:     Vitals:   Blood Pressure: 128/79 (03/21/22 1815)  Pulse: 90 (03/21/22 1815)  Temperature: 98 4 °F (36 9 °C) (03/21/22 1815)  Temp Source: Oral (03/21/22 1815)  Respirations: 16 (03/21/22 1815)  Height: 5' 4" (162 6 cm) (03/21/22 1205)  Weight - Scale: 65 3 kg (143 lb 15 4 oz) (03/21/22 1205)  SpO2: 98 % (03/21/22 1815)    Physical Exam  Vitals and nursing note reviewed  Constitutional:       Appearance: Normal appearance  She is normal weight  Comments: Middle-aged female in bed, awake   HENT:      Head: Normocephalic and atraumatic  Right Ear: External ear normal       Left Ear: External ear normal       Nose: Nose normal  No congestion  Mouth/Throat:      Mouth: Mucous membranes are moist       Pharynx: Oropharynx is clear  No oropharyngeal exudate or posterior oropharyngeal erythema  Eyes:      General: No scleral icterus  Right eye: No discharge  Left eye: No discharge  Extraocular Movements: Extraocular movements intact  Conjunctiva/sclera: Conjunctivae normal       Pupils: Pupils are equal, round, and reactive to light  Cardiovascular:      Rate and Rhythm: Normal rate and regular rhythm  Pulses: Normal pulses  Heart sounds: Normal heart sounds  No murmur heard  No friction rub  No gallop  Pulmonary:      Effort: Pulmonary effort is normal  No respiratory distress  Breath sounds: Normal breath sounds  No stridor  No wheezing, rhonchi or rales  Chest:      Chest wall: No tenderness  Abdominal:      General: Abdomen is flat  Bowel sounds are normal  There is no distension  Palpations: Abdomen is soft  There is no mass  Tenderness: There is no abdominal tenderness  There is no guarding or rebound  Musculoskeletal:         General: No swelling, tenderness, deformity or signs of injury  Normal range of motion  Cervical back: Normal range of motion and neck supple  No rigidity  No muscular tenderness  Comments: Left lower extremity currently covered, distal perfusion preserved   Skin:     General: Skin is warm and dry  Capillary Refill: Capillary refill takes less than 2 seconds  Coloration: Skin is not jaundiced or pale  Findings: No bruising, erythema, lesion or rash  Neurological:      General: No focal deficit present  Mental Status: She is alert and oriented to person, place, and time  Mental status is at baseline  Cranial Nerves: No cranial nerve deficit  Sensory: No sensory deficit  Motor: No weakness  Coordination: Coordination normal       Comments: GCS13   Psychiatric:         Mood and Affect: Mood normal          Behavior: Behavior normal          Thought Content: Thought content normal          Judgment: Judgment normal              Additional Data:     Lab Results: I have personally reviewed pertinent reports        Results from last 7 days   Lab Units 03/16/22  1634   WBC Thousand/uL 7 90   HEMOGLOBIN g/dL 14 3   HEMATOCRIT % 42 3   PLATELETS Thousands/uL 213   NEUTROS PCT % 79*   LYMPHS PCT % 14   MONOS PCT % 5   EOS PCT % 1     Results from last 7 days   Lab Units 03/16/22  1634   SODIUM mmol/L 139   POTASSIUM mmol/L 3 5   CHLORIDE mmol/L 101   CO2 mmol/L 32   BUN mg/dL 17   CREATININE mg/dL 0 78   ANION GAP mmol/L 6   CALCIUM mg/dL 9 0   GLUCOSE RANDOM mg/dL 89     Results from last 7 days   Lab Units 03/16/22  1634   INR  1 05         No results found for: HGBA1C            Imaging: I have personally reviewed pertinent reports  XR knee 1 or 2 vw left    (Results Pending)       ·     ** Please Note: This note has been constructed using a voice recognition system   **

## 2022-03-21 NOTE — ASSESSMENT & PLAN NOTE
Previously on topiramate and triptans in the past, but but only as needed  Currently no active migraine

## 2022-03-21 NOTE — CONSULTS
[address] [facility] 1965, 61 y o  female MRN: [id number]  Unit/Bed#: -01 Encounter: [id number]  Primary Care Provider: Linn Pacheco DO   Date and time admitted to hospital: 3/21/2022 11:46 AM    Inpatient consult to Internal Medicine  Consult performed by: Kvng Schultz MD  Consult ordered by: Rondal Kanner, PA-C          * Postoperative hypoxia  Assessment & Plan  Requiring up to 6L of O2 immediately postop  This appears to be worse with administration of pain medicines  Differentials for hypoxemia would include hypoventilation due to the narcotics, atelectasis, volume overload, PE either due to clot or due to fat embolus given long bone fracture and surgical procedure  In her case most likely she is under effect of the anesthesia and hypoventilating  Fortunately patient improving as far as oxygenation  Plan:  1  Continue O2 supplementation and titrating it to a saturation of 92%, no need for hyperoxia  2  Recommend obtainining a chest X ray which may help identify volume overload, atelectasis or infiltrates  3  Encourage incentive spirometry regardless of results above  4  Attempt to minimize narcotic use as patient exhibits hypoventilation with it  Would suggest multimodal pain control with Tylenol scheduled, Gabapentin  5  Recommend obtaining baseline CBC from today to check Hb (anemia may contribute to hypoxemia) and a BMP to check kidney function in case CTA needed  Also check a BNP  6  If patient remains hypoxic for a prolonged postop period (>12h) even if awake I would proceed with a chest CTA assuming her kidney function is stable to rule out PE, otherwise would recommend V/Q scan      Thank you for consultation      Postoperative pain  Assessment & Plan  Patient with postop pain  Multimodal pain control  Avoid excess of narcotics as it may worsen hypoventilation  Suggest Tylenol scheduled, gabapentin 100mg TID and uptitrate    Closed fracture of distal end of left femur with routine healing  Assessment & Plan  Status post open reduction and internal fixation  Migraine  Assessment & Plan  Previously on topiramate and triptans in the past, but but only as needed  Currently no active migraine         VTE Prophylaxis: Sequential compression device (Venodyne)   / sequential compression device      Recommendations for Discharge:  · As per Orthopedic Service     Counseling / Coordination of Care Time: 45 minutes  Greater than 50% of total time spent on patient counseling and coordination of care      Collaboration of Care: Were Recommendations Directly Discussed with Primary Treatment Team? - Yes      History of Present Illness:     Shantal Samson is a 61 y o  female who is originally admitted to the orthopedics service due to distal femoral fracture  We are consulted for postoperative hypoxemia  Patient did have a fall from stairs about 10 steps less than a week ago and since then she has been having pain in her left leg, she was diagnosed with a distal left femur fracture and came today for elective correction  Patient is currently status post open reduction and internal fixation  Postoperatively patient apparently was fairly hypoxic requiring up to 6 L of oxygen, she was also noted to be hypoventilating with a respiratory rate around 10 at times  Patient is still not completely awake, somewhat obtunded with a GCS of 13, still under effects of anesthesia but can answer simple questions  Significant other at the bedside does endorse similar situation in the past in which patient did have a prolonged recover from anesthesia  Patient herself denies any chest pain, subjective shortness of breath, nausea vomiting or diarrhea  Her pain appears under control at this point  Patient denies any previous history of smoking, COPD or asthma, or any other pulmonary issues for that matter    Denies any history of blood clots            Review of Systems:     Review of Systems   Neurological:        Drowsiness   All other systems reviewed and are negative         Past Medical and Surgical History:      Medical History        Past Medical History:   Diagnosis Date    Arthritis      Migraines              Surgical History         Past Surgical History:   Procedure Laterality Date    CHOLECYSTECTOMY        GALLBLADDER SURGERY        JOINT REPLACEMENT        PATELLA SURGERY   2016     Patella replacement on left knee    REPLACEMENT TOTAL KNEE Left      TONSILECTOMY AND ADNOIDECTOMY   1971     Tonsilectomy only    TONSILLECTOMY        TUBAL LIGATION   1989            Meds/Allergies:     all medications and allergies reviewed     Allergies: No Known Allergies     Social History:     Marital Status:      Substance Use History:   Social History          Substance and Sexual Activity   Alcohol Use Not Currently      Social History      Tobacco Use   Smoking Status Never Smoker   Smokeless Tobacco Never Used      Social History          Substance and Sexual Activity   Drug Use Never         Family History:     non-contributory     Physical Exam:      Vitals:   Blood Pressure: 128/79 (03/21/22 1815)  Pulse: 90 (03/21/22 1815)  Temperature: 98 4 °F (36 9 °C) (03/21/22 1815)  Temp Source: Oral (03/21/22 1815)  Respirations: 16 (03/21/22 1815)  Height: 5' 4" (162 6 cm) (03/21/22 1205)  Weight - Scale: 65 3 kg (143 lb 15 4 oz) (03/21/22 1205)  SpO2: 98 % (03/21/22 1815)     Physical Exam  Vitals and nursing note reviewed  Constitutional:       Appearance: Normal appearance  She is normal weight  Comments: Middle-aged female in bed, awake   HENT:      Head: Normocephalic and atraumatic  Right Ear: External ear normal       Left Ear: External ear normal       Nose: Nose normal  No congestion  Mouth/Throat:      Mouth: Mucous membranes are moist       Pharynx: Oropharynx is clear   No oropharyngeal exudate or posterior oropharyngeal erythema  Eyes:      General: No scleral icterus  Right eye: No discharge  Left eye: No discharge  Extraocular Movements: Extraocular movements intact  Conjunctiva/sclera: Conjunctivae normal       Pupils: Pupils are equal, round, and reactive to light  Cardiovascular:      Rate and Rhythm: Normal rate and regular rhythm  Pulses: Normal pulses  Heart sounds: Normal heart sounds  No murmur heard  No friction rub  No gallop  Pulmonary:      Effort: Pulmonary effort is normal  No respiratory distress  Breath sounds: Normal breath sounds  No stridor  No wheezing, rhonchi or rales  Chest:      Chest wall: No tenderness  Abdominal:      General: Abdomen is flat  Bowel sounds are normal  There is no distension  Palpations: Abdomen is soft  There is no mass  Tenderness: There is no abdominal tenderness  There is no guarding or rebound  Musculoskeletal:         General: No swelling, tenderness, deformity or signs of injury  Normal range of motion  Cervical back: Normal range of motion and neck supple  No rigidity  No muscular tenderness  Comments: Left lower extremity currently covered, distal perfusion preserved   Skin:     General: Skin is warm and dry  Capillary Refill: Capillary refill takes less than 2 seconds  Coloration: Skin is not jaundiced or pale  Findings: No bruising, erythema, lesion or rash  Neurological:      General: No focal deficit present  Mental Status: She is alert and oriented to person, place, and time  Mental status is at baseline  Cranial Nerves: No cranial nerve deficit  Sensory: No sensory deficit  Motor: No weakness  Coordination: Coordination normal       Comments: GCS13   Psychiatric:         Mood and Affect: Mood normal          Behavior: Behavior normal          Thought Content:  Thought content normal          Judgment: Judgment normal                 Additional Data:      Lab Results: I have personally reviewed pertinent reports             Results from last 7 days   Lab Units 03/16/22  1634   WBC Thousand/uL 7 90   HEMOGLOBIN g/dL 14 3   HEMATOCRIT % 42 3   PLATELETS Thousands/uL 213   NEUTROS PCT % 79*   LYMPHS PCT % 14   MONOS PCT % 5   EOS PCT % 1           Results from last 7 days   Lab Units 03/16/22  1634   SODIUM mmol/L 139   POTASSIUM mmol/L 3 5   CHLORIDE mmol/L 101   CO2 mmol/L 32   BUN mg/dL 17   CREATININE mg/dL 0 78   ANION GAP mmol/L 6   CALCIUM mg/dL 9 0   GLUCOSE RANDOM mg/dL 89      Results from last 7 days   Lab Units 03/16/22  1634   INR   1 05          No results found for: HGBA1C             Imaging: I have personally reviewed pertinent reports        XR knee 1 or 2 vw left    (Results Pending)         ·       ** Please Note: This note has been constructed using a voice recognition system   **

## 2022-03-21 NOTE — ANESTHESIA POSTPROCEDURE EVALUATION
Post-Op Assessment Note    CV Status:  Stable  Pain Score: 4    Pain management: adequate  Multimodal analgesia used between 6 hours prior to anesthesia start to PACU discharge    Mental Status:  Alert and awake   Hydration Status:  Stable   PONV Controlled:  None   Airway Patency:  Patent and adequate      Post Op Vitals Reviewed: Yes      Staff: CRNA, Anesthesiologist         No complications documented      BP (!) 171/97 (03/21/22 1547)    Temp 98 4 °F (36 9 °C) (03/21/22 1547)    Pulse 98   Resp (!) 11 (03/21/22 1547)    SpO2 99 % (03/21/22 1547)

## 2022-03-22 VITALS
OXYGEN SATURATION: 99 % | RESPIRATION RATE: 18 BRPM | SYSTOLIC BLOOD PRESSURE: 163 MMHG | DIASTOLIC BLOOD PRESSURE: 94 MMHG | BODY MASS INDEX: 24.58 KG/M2 | HEIGHT: 64 IN | HEART RATE: 94 BPM | TEMPERATURE: 99.2 F | WEIGHT: 143.96 LBS

## 2022-03-22 LAB
ANION GAP SERPL CALCULATED.3IONS-SCNC: 6 MMOL/L (ref 4–13)
BASOPHILS # BLD AUTO: 0.01 THOUSANDS/ΜL (ref 0–0.1)
BASOPHILS NFR BLD AUTO: 0 % (ref 0–1)
BUN SERPL-MCNC: 14 MG/DL (ref 5–25)
CALCIUM SERPL-MCNC: 8.4 MG/DL (ref 8.3–10.1)
CHLORIDE SERPL-SCNC: 100 MMOL/L (ref 100–108)
CO2 SERPL-SCNC: 30 MMOL/L (ref 21–32)
CREAT SERPL-MCNC: 0.7 MG/DL (ref 0.6–1.3)
EOSINOPHIL # BLD AUTO: 0 THOUSAND/ΜL (ref 0–0.61)
EOSINOPHIL NFR BLD AUTO: 0 % (ref 0–6)
ERYTHROCYTE [DISTWIDTH] IN BLOOD BY AUTOMATED COUNT: 13.3 % (ref 11.6–15.1)
GFR SERPL CREATININE-BSD FRML MDRD: 96 ML/MIN/1.73SQ M
GLUCOSE P FAST SERPL-MCNC: 136 MG/DL (ref 65–99)
GLUCOSE SERPL-MCNC: 136 MG/DL (ref 65–140)
HCT VFR BLD AUTO: 32.8 % (ref 34.8–46.1)
HGB BLD-MCNC: 10.5 G/DL (ref 11.5–15.4)
IMM GRANULOCYTES # BLD AUTO: 0.02 THOUSAND/UL (ref 0–0.2)
IMM GRANULOCYTES NFR BLD AUTO: 0 % (ref 0–2)
LYMPHOCYTES # BLD AUTO: 0.65 THOUSANDS/ΜL (ref 0.6–4.47)
LYMPHOCYTES NFR BLD AUTO: 12 % (ref 14–44)
MCH RBC QN AUTO: 29.7 PG (ref 26.8–34.3)
MCHC RBC AUTO-ENTMCNC: 32 G/DL (ref 31.4–37.4)
MCV RBC AUTO: 93 FL (ref 82–98)
MONOCYTES # BLD AUTO: 0.46 THOUSAND/ΜL (ref 0.17–1.22)
MONOCYTES NFR BLD AUTO: 8 % (ref 4–12)
NEUTROPHILS # BLD AUTO: 4.46 THOUSANDS/ΜL (ref 1.85–7.62)
NEUTS SEG NFR BLD AUTO: 80 % (ref 43–75)
NRBC BLD AUTO-RTO: 0 /100 WBCS
PLATELET # BLD AUTO: 188 THOUSANDS/UL (ref 149–390)
PMV BLD AUTO: 11 FL (ref 8.9–12.7)
POTASSIUM SERPL-SCNC: 3.9 MMOL/L (ref 3.5–5.3)
RBC # BLD AUTO: 3.53 MILLION/UL (ref 3.81–5.12)
SODIUM SERPL-SCNC: 136 MMOL/L (ref 136–145)
WBC # BLD AUTO: 5.6 THOUSAND/UL (ref 4.31–10.16)

## 2022-03-22 PROCEDURE — 97166 OT EVAL MOD COMPLEX 45 MIN: CPT

## 2022-03-22 PROCEDURE — 97163 PT EVAL HIGH COMPLEX 45 MIN: CPT

## 2022-03-22 PROCEDURE — NC001 PR NO CHARGE: Performed by: PHYSICIAN ASSISTANT

## 2022-03-22 PROCEDURE — 99232 SBSQ HOSP IP/OBS MODERATE 35: CPT | Performed by: FAMILY MEDICINE

## 2022-03-22 PROCEDURE — 99024 POSTOP FOLLOW-UP VISIT: CPT | Performed by: PHYSICIAN ASSISTANT

## 2022-03-22 PROCEDURE — 80048 BASIC METABOLIC PNL TOTAL CA: CPT | Performed by: PHYSICIAN ASSISTANT

## 2022-03-22 PROCEDURE — 85025 COMPLETE CBC W/AUTO DIFF WBC: CPT | Performed by: PHYSICIAN ASSISTANT

## 2022-03-22 RX ORDER — OXYCODONE HYDROCHLORIDE 5 MG/1
5 TABLET ORAL EVERY 4 HOURS PRN
Qty: 30 TABLET | Refills: 0 | Status: SHIPPED | OUTPATIENT
Start: 2022-03-22 | End: 2022-08-09 | Stop reason: ALTCHOICE

## 2022-03-22 RX ORDER — ASPIRIN 81 MG/1
81 TABLET ORAL EVERY 12 HOURS
Qty: 84 TABLET | Refills: 0 | Status: SHIPPED | OUTPATIENT
Start: 2022-03-22 | End: 2022-08-09

## 2022-03-22 RX ADMIN — DOCUSATE SODIUM 100 MG: 100 CAPSULE, LIQUID FILLED ORAL at 09:18

## 2022-03-22 RX ADMIN — Medication 1 TABLET: at 09:18

## 2022-03-22 RX ADMIN — OXYCODONE HYDROCHLORIDE AND ACETAMINOPHEN 500 MG: 500 TABLET ORAL at 09:18

## 2022-03-22 RX ADMIN — CEFAZOLIN SODIUM 2000 MG: 2 SOLUTION INTRAVENOUS at 04:59

## 2022-03-22 RX ADMIN — HYDROMORPHONE HYDROCHLORIDE 0.2 MG: 0.2 INJECTION, SOLUTION INTRAMUSCULAR; INTRAVENOUS; SUBCUTANEOUS at 05:13

## 2022-03-22 RX ADMIN — ACETAMINOPHEN 650 MG: 325 TABLET, FILM COATED ORAL at 07:16

## 2022-03-22 RX ADMIN — ENOXAPARIN SODIUM 40 MG: 40 INJECTION SUBCUTANEOUS at 04:59

## 2022-03-22 RX ADMIN — ACETAMINOPHEN 650 MG: 325 TABLET, FILM COATED ORAL at 01:15

## 2022-03-22 RX ADMIN — ONDANSETRON 4 MG: 2 INJECTION INTRAMUSCULAR; INTRAVENOUS at 01:18

## 2022-03-22 RX ADMIN — SODIUM CHLORIDE, SODIUM LACTATE, POTASSIUM CHLORIDE, AND CALCIUM CHLORIDE 100 ML/HR: .6; .31; .03; .02 INJECTION, SOLUTION INTRAVENOUS at 09:18

## 2022-03-22 RX ADMIN — ACETAMINOPHEN 650 MG: 325 TABLET, FILM COATED ORAL at 12:18

## 2022-03-22 RX ADMIN — TOPIRAMATE 25 MG: 25 TABLET, FILM COATED ORAL at 09:18

## 2022-03-22 NOTE — OP NOTE
OPERATIVE REPORT  PATIENT NAME: Maria Esther Ackerman    :  1965  MRN: [id number]  Pt Location: [facility] OR ROOM 03    SURGERY DATE: 3/21/2022    Surgeon(s) and Role:     * Alannah Lipscomb MD - Primary     * Nidia Barber PA-C - Assisting    Preop Diagnosis:  Left distal femur medial condylar fracture    Post-Op Diagnosis Codes:  Same    Procedure(s) (LRB):  OPEN REDUCTION W/ INTERNAL FIXATION (ORIF) LEFT DISTAL FEMUR FRACTURE (Left)    Procedure:  ORIF L distal femur medial condylar fracture (CPT 00237)    Specimen(s):  * No specimens in log *    Estimated Blood Loss:   Minimal    Drains:  External Urinary Catheter (Active)   Number of days: 1       Anesthesia Type:   General/LMA    Operative Indications:  Displaced type B medial femoral condylar fracture in ambulatory patient    Operative Findings:  See below    Complications:   None    Implants: Synthes 3 5 mm LCP 8 hl plate, 4 5 mm cannulated screws x2    Procedure and Technique:  The patient is a 68-year-old female with a history of a patellofemoral arthroplasty who sustained a displaced medial condylar distal femur fracture  Given the displacement and partial articular involvement, patient indicated for operative fixation  The patient's operative site, laterality, procedure, consent verified in the preoperative area the patient was transitioned to the operating room  General anesthesia was provided by the anesthesia team and the operative extremity was fractured in the usual sterile fashion  After time-out for safety, standard medial approach to distal femur took place  The interval inferior to the vastus medialis superior to the abductor musculature was utilized and opened  Hemostasis was obtained with vascular feeding vessels  Haley elevator was utilized to gently elevate muscle off of medial distal femoral shaft  The fracture was easily identified and aligned and held with 2 pointed reduction clamps    Two Synthes 4 5 mm partially-threaded cannulated screws were utilized to compress across the plane of the fracture  Fluoroscopic imaging verified extra-articular placement of screws  The 3 5 mm 8 hole plate was then contoured and maneuvered into position to provide buttress plate fixation to the type B fracture  Proximal cortical screws were utilized to create buttress affect and locking screws were used for 2 of the holes given osteoporotic bone quality  After removal of pointed reduction clamps and taking the knee through gentle range of motion, fracture was found to be stable  Wound was copiously irrigated with sterile saline  Deep fascial layer was closed with 0 Vicryl suture, subcutaneous layer closure took place with Vicryl suture, skin layer closure took place with nylon suture  Sterile dressing consisted of Mepilex dressing, the leg was wrapped from foot to thigh with Webril and Ace wrap  All final counts, including but not limited to instrument, sponge, needle counts were correct  I was present for the entire procedure  The patient was placed in a knee immobilizer  The patient was extubated awakened and transitioned to the PACU in stable condition  There were no immediate complications  There was no qualified resident available for the procedure  Critical assistance from Sam Clay PA-C was required for all portions of the procedure including but not limited to positioning, soft tissue retraction, assistance with reduction of fracture, this is particularly important given the shortening of the fracture and sensitive dissection on the medial aspect of the femur  The patient will be nonweightbearing on the operative extremity for an anticipated 6-8 weeks pending bony union  We will allow for knee range of motion at the 1 week chris  We will consider suture removal at 2 weeks      Patient Disposition:  PACU       SIGNATURE: Jerry Hua MD  DATE: March 22, 2022  TIME: 10:01 AM

## 2022-03-22 NOTE — DISCHARGE INSTRUCTIONS
Discharge Instructions - Orthopedics  Guy Barillas 61 y o  female MRN: [id number]  Unit/Bed#: [facility] OR MAIN    Weight Bearing Status:                                           Nonweightbearing left lower extremity in knee immobilizer  DVT prophylaxis  Aspirin 81 mg twice daily x 6 weeks in duration     Pain:  Continue analgesics as directed    Dressing Instructions: May remove dressing and knee immobilizer in 7 days  Okay To begin showering at that time  Allow water to flow over the incision sites  Do not vigorously scrubbed or soak wounds until otherwise stated     Appt Instructions: If you do not have your appointment, please call the clinic at [phone] t  Otherwise followup as scheduled     Contact the office sooner if you experience any increased numbness/tingling in the extremities  Miscellaneous:  Patient may begin gentle ROM of the left knee after knee immobilizer removed

## 2022-03-22 NOTE — PLAN OF CARE
Problem: Prexisting or High Potential for Compromised Skin Integrity  Goal: Skin integrity is maintained or improved  Description: INTERVENTIONS:  - Identify patients at risk for skin breakdown  - Assess and monitor skin integrity  - Assess and monitor nutrition and hydration status  - Monitor labs   - Assess for incontinence   - Turn and reposition patient  - Assist with mobility/ambulation  - Relieve pressure over bony prominences  - Avoid friction and shearing  - Provide appropriate hygiene as needed including keeping skin clean and dry  - Evaluate need for skin moisturizer/barrier cream  - Collaborate with interdisciplinary team   - Patient/family teaching  - Consider wound care consult   3/22/2022 1509 by Brady Vicente RN  Outcome: Adequate for Discharge  3/22/2022 1018 by Brady Vicente RN  Outcome: Progressing     Problem: PAIN - ADULT  Goal: Verbalizes/displays adequate comfort level or baseline comfort level  Description: Interventions:  - Encourage patient to monitor pain and request assistance  - Assess pain using appropriate pain scale  - Administer analgesics based on type and severity of pain and evaluate response  - Implement non-pharmacological measures as appropriate and evaluate response  - Consider cultural and social influences on pain and pain management  - Notify physician/advanced practitioner if interventions unsuccessful or patient reports new pain  3/22/2022 1509 by Brady Vicente RN  Outcome: Adequate for Discharge  3/22/2022 1018 by Brady Vicente RN  Outcome: Progressing     Problem: INFECTION - ADULT  Goal: Absence or prevention of progression during hospitalization  Description: INTERVENTIONS:  - Assess and monitor for signs and symptoms of infection  - Monitor lab/diagnostic results  - Monitor all insertion sites, i e  indwelling lines, tubes, and drains  - Monitor endotracheal if appropriate and nasal secretions for changes in amount and color  - Purchase appropriate cooling/warming therapies per order  - Administer medications as ordered  - Instruct and encourage patient and family to use good hand hygiene technique  - Identify and instruct in appropriate isolation precautions for identified infection/condition  3/22/2022 1509 by Farhat Tuttle RN  Outcome: Adequate for Discharge  3/22/2022 1018 by Farhat Tuttle RN  Outcome: Progressing  Goal: Absence of fever/infection during neutropenic period  Description: INTERVENTIONS:  - Monitor WBC    3/22/2022 1509 by Farhat Tuttle RN  Outcome: Adequate for Discharge  3/22/2022 1018 by Farhat Tuttle RN  Outcome: Progressing     Problem: SAFETY ADULT  Goal: Patient will remain free of falls  Description: INTERVENTIONS:  - Educate patient/family on patient safety including physical limitations  - Instruct patient to call for assistance with activity   - Consult OT/PT to assist with strengthening/mobility   - Keep Call bell within reach  - Keep bed low and locked with side rails adjusted as appropriate  - Keep care items and personal belongings within reach  - Initiate and maintain comfort rounds  - Make Fall Risk Sign visible to staff  - Offer Toileting every  Hours, in advance of need  - Initiate/Maintain alarm  - Obtain necessary fall risk management equipment:   - Apply yellow socks and bracelet for high fall risk patients  - Consider moving patient to room near nurses station  3/22/2022 1509 by Farhat Tuttle RN  Outcome: Adequate for Discharge  3/22/2022 1018 by Farhat Tuttle RN  Outcome: Progressing  Goal: Maintain or return to baseline ADL function  Description: INTERVENTIONS:  -  Assess patient's ability to carry out ADLs; assess patient's baseline for ADL function and identify physical deficits which impact ability to perform ADLs (bathing, care of mouth/teeth, toileting, grooming, dressing, etc )  - Assess/evaluate cause of self-care deficits   - Assess range of motion  - Assess patient's mobility; develop plan if impaired  - Assess patient's need for assistive devices and provide as appropriate  - Encourage maximum independence but intervene and supervise when necessary  - Involve family in performance of ADLs  - Assess for home care needs following discharge   - Consider OT consult to assist with ADL evaluation and planning for discharge  - Provide patient education as appropriate  3/22/2022 1509 by Chica Guillory RN  Outcome: Adequate for Discharge  3/22/2022 1018 by Chica Guillory RN  Outcome: Progressing  Goal: Maintains/Returns to pre admission functional level  Description: INTERVENTIONS:  - Perform BMAT or MOVE assessment daily    - Set and communicate daily mobility goal to care team and patient/family/caregiver  - Collaborate with rehabilitation services on mobility goals if consulted  - Perform Range of Motion  times a day  - Reposition patient every  hours    - Dangle patient  times a day  - Stand patient  times a day  - Ambulate patient  times a day  - Out of bed to chair  times a day   - Out of bed for meals  times a day  - Out of bed for toileting  - Record patient progress and toleration of activity level   3/22/2022 1509 by Chica Guillory RN  Outcome: Adequate for Discharge  3/22/2022 1018 by Chica Guillory RN  Outcome: Progressing     Problem: DISCHARGE PLANNING  Goal: Discharge to home or other facility with appropriate resources  Description: INTERVENTIONS:  - Identify barriers to discharge w/patient and caregiver  - Arrange for needed discharge resources and transportation as appropriate  - Identify discharge learning needs (meds, wound care, etc )  - Arrange for interpretive services to assist at discharge as needed  - Refer to Case Management Department for coordinating discharge planning if the patient needs post-hospital services based on physician/advanced practitioner order or complex needs related to functional status, cognitive ability, or social support system  3/22/2022 1509 by Chica Guillory RN  Outcome: Adequate for Discharge  3/22/2022 1018 by Rolly Ceron RN  Outcome: Progressing     Problem: Knowledge Deficit  Goal: Patient/family/caregiver demonstrates understanding of disease process, treatment plan, medications, and discharge instructions  Description: Complete learning assessment and assess knowledge base  Interventions:  - Provide teaching at level of understanding  - Provide teaching via preferred learning methods  3/22/2022 1509 by Rolly Ceron RN  Outcome: Adequate for Discharge  3/22/2022 1018 by Rolly Ceron RN  Outcome: Progressing     Problem: MOBILITY - ADULT  Goal: Maintain or return to baseline ADL function  Description: INTERVENTIONS:  -  Assess patient's ability to carry out ADLs; assess patient's baseline for ADL function and identify physical deficits which impact ability to perform ADLs (bathing, care of mouth/teeth, toileting, grooming, dressing, etc )  - Assess/evaluate cause of self-care deficits   - Assess range of motion  - Assess patient's mobility; develop plan if impaired  - Assess patient's need for assistive devices and provide as appropriate  - Encourage maximum independence but intervene and supervise when necessary  - Involve family in performance of ADLs  - Assess for home care needs following discharge   - Consider OT consult to assist with ADL evaluation and planning for discharge  - Provide patient education as appropriate  3/22/2022 1509 by Rolly Ceron RN  Outcome: Adequate for Discharge  3/22/2022 1018 by Rolly Ceron RN  Outcome: Progressing  Goal: Maintains/Returns to pre admission functional level  Description: INTERVENTIONS:  - Perform BMAT or MOVE assessment daily    - Set and communicate daily mobility goal to care team and patient/family/caregiver  - Collaborate with rehabilitation services on mobility goals if consulted  - Perform Range of Motion  times a day  - Reposition patient every hours    - Dangle patient  times a day  - Stand patient  times a day  - Ambulate patient  times a day  - Out of bed to chair  times a day   - Out of bed for meals  times a day  - Out of bed for toileting  - Record patient progress and toleration of activity level   3/22/2022 1509 by Starr Sawyer RN  Outcome: Adequate for Discharge  3/22/2022 1018 by Starr Sawyer RN  Outcome: Progressing     Problem: Potential for Falls  Goal: Patient will remain free of falls  Description: INTERVENTIONS:  - Educate patient/family on patient safety including physical limitations  - Instruct patient to call for assistance with activity   - Consult OT/PT to assist with strengthening/mobility   - Keep Call bell within reach  - Keep bed low and locked with side rails adjusted as appropriate  - Keep care items and personal belongings within reach  - Initiate and maintain comfort rounds  - Make Fall Risk Sign visible to staff  - Offer Toileting every  Hours, in advance of need  - Initiate/Maintain alarm  - Obtain necessary fall risk management equipmen  - Apply yellow socks and bracelet for high fall risk patients  - Consider moving patient to room near nurses station  3/22/2022 1509 by Starr Sawyer RN  Outcome: Adequate for Discharge  3/22/2022 1018 by Starr Sawyer RN  Outcome: Progressing

## 2022-03-22 NOTE — PLAN OF CARE
Problem: OCCUPATIONAL THERAPY ADULT  Goal: Performs self-care activities at highest level of function for planned discharge setting  See evaluation for individualized goals  Description: Treatment Interventions: ADL retraining,Functional transfer training,Endurance training,Patient/family training,Equipment evaluation/education,Compensatory technique education,Continued evaluation,Activityengagement,Energy conservation          See flowsheet documentation for full assessment, interventions and recommendations  Note: Limitation: Decreased ADL status,Decreased endurance,Decreased self-care trans,Decreased high-level ADLs  Prognosis: Good  Assessment: Patient is a 61 y o  female seen for OT evaluation s/p admit to [id number] [place] [address] on 3/21/2022 w/Postoperative hypoxia  Commorbidities affecting patient's functional performance at time of assessment include: postoperative pain, closed fracture of distal end of L femur with routine healing, and migraine  Patient  has a past medical history of Arthritis and Migraines  Orders placed for OT evaluation and treatment  Performed at least two patient identifiers during session including name and wristband  Prior to admission, patient was living with her son in a two-story home with 5 ISAI via the garage, a FOS to the second floor where the bedroom is located, and an optional first-floor setup with a full bathroom available  Patient reports that at baseline, she is independent in both ADLs and IADLs  Personal factors affecting patient at time of initial evaluation include: steps to enter, difficulty performing ADLs and difficulty performing IADLs, and interior steps  Upon evaluation, patient requires modified independent, supervision and set up assist for UB ADLs, minimal  and moderate assist for LB ADLs, transfers and functional ambulation in room and bathroom with contact guard assist, with the use of [address]  Patient is alert and oriented x 4   Occupational performance is affected by the following deficits: dynamic sit/ stand balance deficit with poor standing tolerance time for self care and functional mobility, decreased activity tolerance, increased pain and orthopedic restrictions  Patient to benefit from continued Occupational Therapy treatment while in the hospital to address deficits as defined above and maximize level of functional independence with ADLs and functional mobility  Occupational Performance areas to address include: bathing/ shower, dressing, toilet hygiene, transfer to all surfaces, functional mobility, emergency response and IADLs: safety procedures  From OT standpoint, recommendation at time of d/c would be Home with family support and Home OT         OT Discharge Recommendation: Home with home health rehabilitation

## 2022-03-22 NOTE — OCCUPATIONAL THERAPY NOTE
Occupational Therapy Evaluation Note        Patient Name: Vinton Spurling  XCFKC'T Date: 3/22/2022        03/22/22 0850   OT Last Visit   OT Visit Date 03/22/22   Note Type   Note type Evaluation   Restrictions/Precautions   Weight Bearing Precautions Per Order Yes   LLE Weight Bearing Per Order NWB  (per orthopedics)   Braces or Orthoses LE Immobilizer  (L knee immobilizer)   Other Precautions Chair Alarm; Bed Alarm;WBS;Fall Risk;Pain;Multiple lines   Pain Assessment   Pain Assessment Tool 0-10   Pain Score 7   Pain Location/Orientation Orientation: Left; Location: Knee   Hospital Pain Intervention(s) Repositioned; Ambulation/increased activity; Elevated; Rest   Home Living   Type of [address] [place] [address] Two level;Stairs to enter with rails;Bed/bath upstairs  (5 ISAI via garage c B HR  FOS to 2nd floor c U/L handrail)   Bathroom Shower/Tub Tub/shower unit  (walk in shower on 1st floor optional)   [address] [place] [address] chair; Toilet raiser   [address] Crutches;Walker  (no AD used at baseline, crutches used since injury on 3/16)   Additional Comments pt reports she plans to be able to access 2nd floor, does have full bathroom on 1st floor & could sleep on the couch   Prior Function   Level of [place] Independent with ADLs and functional mobility   Lives With Son;Family  (Son's girlfriend home t/o the day)   Ady Simon 31 in the last 6 months 1 to 4   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, full-time employed)   Comments (+) drives   Lifestyle   Autonomy Per patient report, she lives with her son and his SO in a two-story home with 5 ISAI via the garage and a FOS to the second floor where the bedroom and bathroom are located  Patient reports that there is an optional first-floor setup with a full bathroom  At baseline, patient is independent in both ADLs and IADLs and is ambulatory with no AD  Patient reports that since her injury, she has been ambulatory with crutches  Reciprocal Relationships Son   [employer] to [employer] employed, works from home   Psychosocial   Psychosocial ([address]) [address] 6  [address] 5  [address] [facility] 4  [address] 6  Modified independent   [address] 3  Moderate Assistance   [address] 4  Minimal Assistance   Additional Comments ADL assist levels based on pt's functional performance during OT evaluation   Bed Mobility   Supine to Sit 4  Minimal assistance   Additional items Assist x 1;HOB elevated; Increased time required;Verbal cues;LE management   Additional Comments Patient received lying supine in bed upon OT arrival; at end of session: pt seated OOB to recliner chair w/ all needs within reach, chair alarm activated, and legs elevated  Patient reports dizziness with positional changes, reports resolve in symptoms with increased time   Transfers   Sit to Stand 4  Minimal assistance  (CGA)   Additional items Assist x 1; Increased time required;Verbal cues   Stand to Sit 4  Minimal assistance  (CGA)   Additional items Assist x 1; Armrests; Increased time required;Verbal cues   Additional Comments Performed functional transfers using RW  Patient verbalized understanding of WB restriction and able to maintain NWB LLE t/o evaluation  Functional Mobility   Functional Mobility 4  Minimal assistance   Additional Comments CGA x1; ~3-4 feet from EOB to recliner chair with no overt LOB or SOB   HR elevated to 128 bpm at highest    Additional items Rolling walker   Balance   Static Sitting Good   Dynamic Sitting Fair +   Static Standing Fair   Dynamic Standing Fair -   Ambulatory Fair -   Activity Tolerance   Activity Tolerance Patient limited by fatigue;Patient limited by pain  (Elevated HR, (+) dizziness)   Medical Staff Made Aware PT Ludivina Horn   Nurse Made Aware SIOBHAN Garcia confirmed pt appropriate for therapy   RUE Assessment   RUE Assessment WFL  (AROM and strength WFL based on functional assessment)   LUE Assessment   LUE Assessment WFL  (AROM and strength WFL based on functional assessment)   Hand Function   Gross Motor Coordination Functional  (Based on pt's functional performance)   Fine Motor Coordination Functional  (Based on pt's functional performance)   Sensation   Light Touch No apparent deficits  (BUEs)   Additional Comments Patient denies diminished sensation t/o BUEs   Vision-Basic Assessment   Current Vision Wears glasses all the time  (Reports glasses for both distance and reading)   Patient Visual Report Other (Comment)  (pt denies acute visual changes)   Cognition   Overall Cognitive Status [facility]   Arousal/Participation Alert; Responsive; Cooperative   Attention Within functional limits   Orientation Level Oriented X4   Memory Within functional limits   Following Commands Follows all commands and directions without difficulty   Comments Patient agreeable to OT evaluation   Assessment   Limitation Decreased ADL status; Decreased endurance;Decreased self-care trans;Decreased high-level ADLs   Prognosis Good   Assessment Patient is a 61 y o  female seen for OT evaluation s/p admit to [id number] [place] [address] on 3/21/2022 w/Postoperative hypoxia  Commorbidities affecting patient's functional performance at time of assessment include: postoperative pain, closed fracture of distal end of L femur with routine healing, and migraine  Patient  has a past medical history of Arthritis and Migraines  Orders placed for OT evaluation and treatment  Performed at least two patient identifiers during session including name and wristband    Prior to admission, patient was living with her son in a two-story home with 5 ISAI via the garage, a FOS to the second floor where the bedroom is located, and an optional first-floor setup with a full bathroom available  Patient reports that at baseline, she is independent in both ADLs and IADLs  Personal factors affecting patient at time of initial evaluation include: steps to enter, difficulty performing ADLs and difficulty performing IADLs, and interior steps  Upon evaluation, patient requires modified independent, supervision and set up assist for UB ADLs, minimal  and moderate assist for LB ADLs, transfers and functional ambulation in room and bathroom with contact guard assist, with the use of [address]  Patient is alert and oriented x 4  Occupational performance is affected by the following deficits: dynamic sit/ stand balance deficit with poor standing tolerance time for self care and functional mobility, decreased activity tolerance, increased pain and orthopedic restrictions  Patient to benefit from continued Occupational Therapy treatment while in the hospital to address deficits as defined above and maximize level of functional independence with ADLs and functional mobility  Occupational Performance areas to address include: bathing/ shower, dressing, toilet hygiene, transfer to all surfaces, functional mobility, emergency response and IADLs: safety procedures  From OT standpoint, recommendation at time of d/c would be Home with family support and Home OT  Goals   Patient Goals to return home   Plan   Treatment Interventions ADL retraining;Functional transfer training; Endurance training;Patient/family training;Equipment evaluation/education; Compensatory technique education;Continued evaluation; Activityengagement; Energy conservation   Goal Expiration Date 04/01/22   OT Treatment Day 0   OT Frequency 2-3x/wk   Recommendation   OT Discharge Recommendation Home with home health rehabilitation   Additional Comments  The patient's raw score on the AM-PAC Daily Activity inpatient short form is 20, standardized score is 42 03, greater than 39 4   Patients at this level are likely to benefit from discharge to home  Please refer to the recommendation of the Occupational Therapist for safe discharge planning  AM-PAC Daily Activity Inpatient   Lower Body Dressing 2   Bathing 3   Toileting 3   Upper Body Dressing 4   Grooming 4   Eating 4   Daily Activity Raw Score 20   Daily Activity Standardized Score (Calc for Raw Score >=11) 42 03   AM-PAC Applied Cognition Inpatient   Following a Speech/Presentation 4   Understanding Ordinary Conversation 4   Taking Medications 4   Remembering Where Things Are Placed or Put Away 4   Remembering List of 4-5 Errands 4   Taking Care of Complicated Tasks 4   Applied Cognition Raw Score 24   Applied Cognition Standardized Score 62 21   Barthel Index   Feeding 10   Bathing 0   Grooming Score 5   Dressing Score 5   Bladder Score 5   Bowels Score 10   Toilet Use Score 5   Transfers (Bed/Chair) Score 10   Mobility (Level Surface) Score 0   Stairs Score 0   Barthel Index Score 50   Modified [place] Scale   Modified [place] Scale 4     Occupational Therapy Goals to be completed in 7-10 Days:    1 - Patient will verbalize and demonstrate use of energy conservation/ deep breathing technique and work simplification skills during functional activity with no verbal cues  2 - Patient will verbalize and demonstrate good body mechanics and joint protection techniques during ADLs/ IADLs with no verbal cues  3 - Patient will increase OOB/ sitting tolerance to 4-6 hours per day for increased participation in self care and leisure tasks with no s/s of exertion  4 - Patient will increase standing tolerance time to 5 minutes with unilateral UE support to complete sink level ADLs@ mod I level while maintaining NWB LLE restriction  5 - Patient will increase sitting tolerance at edge of bed to 30 minutes to complete UB ADLs @ set up assist level  6 - Patient will transfer bed to Chair / toilet at Mod I assist level with least restrictive AD  7 - Patient will complete UB ADLs with Mod I assist      8 - Patient will complete LB ADLs with supervision/setup assist with the use of adaptive equipment  9 - Patient will complete toileting hygiene with set up assist/ supervision for thoroughness      Vonnie Sharma, OTR/L

## 2022-03-22 NOTE — PROGRESS NOTES
[address] [facility]  Progress Note - Yvette Ip 1965, 61 y o  female MRN: [id number]  Unit/Bed#: -01 Encounter: [id number]  Primary Care Provider: Floyd Mclaughlin DO   Date and time admitted to hospital: 3/21/2022 11:46 AM    * Postoperative hypoxia  Assessment & Plan  Medicine consult was obtained yesterday as patient was requiring 6 L of O2 postoperatively  This was thought to be secondary to hypoventilation due to narcotics/anesthesia    · Chest x-ray was performed which was negative for any acute findings (official report pending)  · This morning patient is on room air and breast/chest pain  · Encouraged incentive spirometry regardless  · Attempt to minimize narcotic use and consider Tylenol/NSAID/Lidocaine patches  · I anticipate patient will be discharging home today with home health services  Medicine team will sign off for now  Thank you for consulting us  Postoperative pain  Assessment & Plan  · Patient with postop pain but currently well controlled  · Avoid excess narcotic use as may have worsened hypoventilation  · Currently on Tylenol/p r n  Oxycodone  Closed fracture of distal end of left femur with routine healing  Assessment & Plan  · Status post left distal femur fracture ORIF (postop day 1 )  · Further plan per Orthopedic surgery  VTE Pharmacologic Prophylaxis:   Pharmacologic: Enoxaparin (Lovenox)  Mechanical VTE Prophylaxis in Place: No  Education and Discussions with Family / Patient: patient    Time Spent for Care: 30 minutes  More than 50% of total time spent on counseling and coordination of care as described above      Current Length of Stay: 0 day(s)    Current Patient Status: Outpatient Surgery   Certification Statement: The patient will continue to require additional inpatient hospital stay due to likely dc today home with home health    Discharge Plan: dc later today    Code Status: Level 1 - Full Code      Subjective:   Now on RA  Denies any acute concerns current report    Objective:     Vitals:   Temp (24hrs), Av 5 °F (36 9 °C), Min:97 4 °F (36 3 °C), Max:99 3 °F (37 4 °C)    Temp:  [97 4 °F (36 3 °C)-99 3 °F (37 4 °C)] 99 2 °F (37 3 °C)  HR:  [] 94  Resp:  [8-18] 18  BP: (128-175)/(74-97) 163/94  SpO2:  [94 %-100 %] 99 %  Body mass index is 24 71 kg/m²  Input and Output Summary (last 24 hours): Intake/Output Summary (Last 24 hours) at 3/22/2022 1134  Last data filed at 3/22/2022 0900  Gross per 24 hour   Intake 1420 ml   Output 1100 ml   Net 320 ml       Physical Exam:     Physical Exam  Constitutional:       General: She is not in acute distress  Appearance: She is normal weight  She is not toxic-appearing  HENT:      Mouth/Throat:      Mouth: Mucous membranes are moist       Pharynx: Oropharynx is clear  Cardiovascular:      Rate and Rhythm: Normal rate and regular rhythm  Pulses: Normal pulses  Pulmonary:      Effort: Pulmonary effort is normal       Breath sounds: Normal breath sounds  Abdominal:      General: Abdomen is flat  Bowel sounds are normal       Palpations: Abdomen is soft  Neurological:      General: No focal deficit present  Mental Status: She is alert and oriented to person, place, and time  Additional Data:     Labs:    Results from last 7 days   Lab Units 22  0458   WBC Thousand/uL 5 60   HEMOGLOBIN g/dL 10 5*   HEMATOCRIT % 32 8*   PLATELETS Thousands/uL 188   NEUTROS PCT % 80*   LYMPHS PCT % 12*   MONOS PCT % 8   EOS PCT % 0     Results from last 7 days   Lab Units 22  0458   SODIUM mmol/L 136   POTASSIUM mmol/L 3 9   CHLORIDE mmol/L 100   CO2 mmol/L 30   BUN mg/dL 14   CREATININE mg/dL 0 70   ANION GAP mmol/L 6   CALCIUM mg/dL 8 4   GLUCOSE RANDOM mg/dL 136     Results from last 7 days   Lab Units 22  1634   INR  1 05                   * I Have Reviewed All Lab Data Listed Above  * Additional Pertinent Lab Tests Reviewed:  All Labs Within Last 24 Hours Reviewed      Recent Cultures (last 7 days):           Last 24 Hours Medication List:   Current Facility-Administered Medications   Medication Dose Route Frequency Provider Last Rate    acetaminophen  650 mg Oral Q6H [facility] Asif Smith PA-C      ascorbic acid  500 mg Oral Daily Priscilla Silversmith, PAO      docusate sodium  100 mg Oral BID Priscilla Silversmith, PAO      enoxaparin  40 mg Subcutaneous Daily Priscilla Silversmith, PA-KRISTIAN      lactated ringers  100 mL/hr Intravenous Continuous Priscilla Silversmith, PA-C 100 mL/hr (03/22/22 5198)    multivitamin-minerals  1 tablet Oral Daily Priscilla Silversmith, PAO      ondansetron  4 mg Intravenous Q6H PRN Marine Blue, PAO      oxyCODONE  10 mg Oral Q4H PRN Priscilla Silversmith, PA-KRISTIAN      oxyCODONE  5 mg Oral Q4H PRN Priscilla Silversmith, PAO      rizatriptan  5 mg Oral Once PRN Priscilla Silversmith, PAO      topiramate  25 mg Oral BID Priscilla Silvermaria cith, PAO          Today, Patient Was Seen By: CATRACHO Garrido      ** Please Note: Dictation voice to text software may have been used in the creation of this document   **

## 2022-03-22 NOTE — PROGRESS NOTES
Progress Note - Orthopedics   Arianne Level 61 y o  female MRN: [id number]  Unit/Bed#: MO XRAY      Subjective:    61 y  o female POD#1 left distal femur ORIF  Patient states that kneeling is doing well overall  Patient states she continues to have pain over the medial aspect of the leg  Patient states she has been complying with nonweightbearing on the left lower extremity  Patient states that she has been maintaining her knee immobilizer at all times as directed  Patient states she has been elevating her leg and has been compliant with anticoagulation while in hospital   Patient denies any fevers any chills  Patient denies any shortness of breath chest tightness chest pain  Patient denies any dizziness or lightheadedness  Patient did have an episode of nausea and vomiting overnight which was treated with Zofran  Patient offers no other complaints at this time          Labs:  0   Lab Value Date/Time    HCT 32 8 (L) 03/22/2022 0458    HCT 36 0 03/21/2022 1949    HCT 42 3 03/16/2022 1634    HGB 10 5 (L) 03/22/2022 0458    HGB 11 4 (L) 03/21/2022 1949    HGB 14 3 03/16/2022 1634    INR 1 05 03/16/2022 1634    WBC 5 60 03/22/2022 0458    WBC 6 05 03/21/2022 1949    WBC 7 90 03/16/2022 1634       Meds:    Current Facility-Administered Medications:     acetaminophen (TYLENOL) tablet 650 mg, 650 mg, Oral, Q6H [address], Asif Smith PA-C, 650 mg at 03/22/22 9026    ascorbic acid (VITAMIN C) tablet 500 mg, 500 mg, Oral, Daily, Harinder Badillo PA-C    docusate sodium (COLACE) capsule 100 mg, 100 mg, Oral, BID, Asif Smith PA-C, 100 mg at 03/21/22 1855    enoxaparin (LOVENOX) subcutaneous injection 40 mg, 40 mg, Subcutaneous, Daily, Asif Smith PA-C, 40 mg at 03/22/22 0459    HYDROmorphone HCl (DILAUDID) injection 0 2 mg, 0 2 mg, Intravenous, Q3H PRN, Harinder Badillo PA-C, 0 2 mg at 03/22/22 0513    lactated ringers infusion, 100 mL/hr, Intravenous, Continuous, Harinder Badillo, PAO, Last Rate: 100 mL/hr at 03/21/22 2223, 100 mL/hr at 03/21/22 2223    multivitamin-minerals (CENTRUM) tablet 1 tablet, 1 tablet, Oral, Daily, Priscilla Pedersen PA-C    ondansetron [facility]) injection 4 mg, 4 mg, Intravenous, Q6H PRN, [facility], PA-KRISTIAN, 4 mg at 03/22/22 0118    oxyCODONE (ROXICODONE) immediate release tablet 10 mg, 10 mg, Oral, Q4H PRN, Priscilla Pedersen PA-C    oxyCODONE (ROXICODONE) IR tablet 5 mg, 5 mg, Oral, Q4H PRN, Priscilla Pedersen PA-C    rizatriptan (MAXALT) tablet 5 mg, 5 mg, Oral, Once PRN, Priscilla Pedersen PA-C    topiramate (TOPAMAX) tablet 25 mg, 25 mg, Oral, BID, Asif Smith PA-C, 25 mg at 03/21/22 1856    Blood Culture:   No results found for: BLOODCX    Wound Culture:   No results found for: WOUNDCULT    Ins and Outs:  I/O last 24 hours: In: 1000 [I V :1000]  Out: 1100 [Urine:900; Emesis/NG output:200]          Physical:  Vitals:    03/22/22 0221   BP: 150/84   Pulse: 91   Resp: 18   Temp: 99 1 °F (37 3 °C)   SpO2: 100%     Musculoskeletal: left Lower Extremity  · Patient resting comfortably in hospital bed in no acute distress  · Skin  :  No acute visual abnormalities present in the left lower extremity  Extremity appears well-perfused overall  · Dressing  :  Dressings are clean dry and intact with no visible soilage present   · TTP  :  Tenderness to palpation noted over the medial knee region  No other bony or soft tissue tenderness to palpation noted at this time  · SILT s/s/sp/dp/t  +fhl/ehl, +ankle dorsi/plantar flexion  2+ DP pulse      Assessment:    61 y  o female POD#1 left distal femur ORIF      Plan:  · Continue nonweightbearing left lower extremity in knee immobilizer at all times  · Up and out of bed   · PT/OT for ambulation assistance, gait training  · Pain control as directed    Will likely DC hydromorphone in near future to trial PO medications   · DVT ppx  :  Lovenox 40 mg while in hospital   Will transition to aspirin 81 mg twice daily in outpatient setting  · Dispo: Ortho will follow  Slightly better pain control in comparison to yesterday  Nonweightbearing left lower extremity  Maintain knee immobilizer at all times  Ice/elevation of the left lower extremity at this time  Norman Elder PA-C             Portions of the record may have been created with voice recognition software  Occasional wrong word or "sound a like" substitutions may have occurred due to the inherent limitations of voice recognition software  Read the chart carefully and recognize, using context, where substitutions have occurred

## 2022-03-22 NOTE — ASSESSMENT & PLAN NOTE
· Patient with postop pain but currently well controlled  · Avoid excess narcotic use as may have worsened hypoventilation  · Currently on Tylenol/p r n  Oxycodone

## 2022-03-22 NOTE — PLAN OF CARE
Problem: PHYSICAL THERAPY ADULT  Goal: Performs mobility at highest level of function for planned discharge setting  See evaluation for individualized goals  Description: Treatment/Interventions: Functional transfer training,LE strengthening/ROM,Therapeutic exercise,Patient/family training,Equipment eval/education,Bed mobility,Gait training,Spoke to nursing,Elevations  Equipment Recommended:  (RW vs crutches)       See flowsheet documentation for full assessment, interventions and recommendations  3/22/2022 0912 by Tomer Polk PT  Note: Prognosis: Excellent  Problem List: Decreased strength,Decreased range of motion,Decreased endurance,Impaired balance,Decreased mobility,Pain,Orthopedic restrictions  Assessment: Pt is 61 y o  female seen for high-complexity PT evaluation on 3/22/2022 s/p admit to [facility] on 3/21/2022 w/ Postoperative hypoxia  PT was consulted to assess pt's functional mobility and d/c needs  Order placed for PT eval and tx, w/ up w/ A and NWB L LE order  PTA, pt resides with son and family in [facility] c [address] [place], ambulates typically without AD, using crutches since injury on 3/16/22  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, (+)   At time of eval, pt requiring min A for bed mobility, CGA for transfers and short gait trial from EOB to recliner c use of RW  Upon evaluation, pt presenting with impaired functional mobility d/t decreased strength, decreased ROM, impaired balance, decreased mobility, orthopedic restrictions of POD#1 left distal femur ORIF, pain and activity intolerance  Pertinent PMHx and current co-morbidities affecting pt's physical performance at time of assessment include: postoperative hypoxia, closed fracture of L femur, postoperative pain, migraine   Personal factors affecting pt at time of eval include: inaccessible home environment, lives in 2 story house, ambulating w/ assistive device, stairs to enter home, inability to navigate community distances and positive fall history  The following objective measures performed on IE also reveal limitations: Barthel Index: 50/100, Modified Sheryl: 4 (moderate/severe disability) and AM-PAC 6-Clicks: 51/12  Pt's clinical presentation is currently unstable/unpredictable seen in pt's presentation of abnormal lab value(s), need for input for task focus and mobility technique, L knee pain impacting overall mobility status, ongoing medical assessment and elevated HR c mobility  Overall, pt's rehab potential and prognosis to return to PLOF is excellent as impacted by objective findings, warranting pt to receive further skilled PT interventions to address identified impairments, activity limitation(s), and participation restriction(s)  Pt to benefit from continued PT tx to address deficits as defined above and maximize level of functional independent mobility and consistency  From PT/mobility standpoint, recommendation at time of d/c would be home with home health rehabilitation pending progress in order to facilitate return to PLOF  Barriers to Discharge: Inaccessible home environment        PT Discharge Recommendation: Home with home health rehabilitation          See flowsheet documentation for full assessment  3/22/2022 0912 by Ximena Nichole PT  Note: Prognosis: Excellent  Problem List: Decreased strength,Decreased range of motion,Decreased endurance,Impaired balance,Decreased mobility,Pain,Orthopedic restrictions  Assessment: Pt is 61 y o  female seen for high-complexity PT evaluation on 3/22/2022 s/p admit to [facility] on 3/21/2022 w/ Postoperative hypoxia  PT was consulted to assess pt's functional mobility and d/c needs  Order placed for PT eval and tx, w/ up w/ A and NWB L LE order  PTA, pt resides with son and family in [facility] [place] [address] [place], ambulates typically without AD, using crutches since injury on 3/16/22  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, (+)    At time of eval, pt requiring min A for bed mobility, CGA for transfers and short gait trial from EOB to recliner c use of RW  Upon evaluation, pt presenting with impaired functional mobility d/t decreased strength, decreased ROM, impaired balance, decreased mobility, orthopedic restrictions of POD#1 left distal femur ORIF, pain and activity intolerance  Pertinent PMHx and current co-morbidities affecting pt's physical performance at time of assessment include: postoperative hypoxia, closed fracture of L femur, postoperative pain, migraine  Personal factors affecting pt at time of eval include: inaccessible home environment, lives in 2 story house, ambulating w/ assistive device, stairs to enter home, inability to navigate community distances and positive fall history  The following objective measures performed on IE also reveal limitations: Barthel Index: 50/100, Modified Sheryl: 4 (moderate/severe disability) and AM-PAC 6-Clicks: 23/07  Pt's clinical presentation is currently unstable/unpredictable seen in pt's presentation of abnormal lab value(s), need for input for task focus and mobility technique, L knee pain impacting overall mobility status, ongoing medical assessment and elevated HR c mobility  Overall, pt's rehab potential and prognosis to return to PLOF is excellent as impacted by objective findings, warranting pt to receive further skilled PT interventions to address identified impairments, activity limitation(s), and participation restriction(s)  Pt to benefit from continued PT tx to address deficits as defined above and maximize level of functional independent mobility and consistency  From PT/mobility standpoint, recommendation at time of d/c would be home with home health rehabilitation pending progress in order to facilitate return to PLOF  Barriers to Discharge: Inaccessible home environment        PT Discharge Recommendation: Home with home health rehabilitation          See flowsheet documentation for full assessment

## 2022-03-22 NOTE — ASSESSMENT & PLAN NOTE
Medicine consult was obtained yesterday as patient was requiring 6 L of O2 postoperatively  This was thought to be secondary to hypoventilation due to narcotics/anesthesia    · Chest x-ray was performed which was negative for any acute findings (official report pending)  · This morning patient is on room air and breast/chest pain  · Encouraged incentive spirometry regardless  · Attempt to minimize narcotic use and consider Tylenol/NSAID/Lidocaine patches  · I anticipate patient will be discharging home today with home health services  Medicine team will sign off for now  Thank you for consulting us

## 2022-03-22 NOTE — ASSESSMENT & PLAN NOTE
· Status post left distal femur fracture ORIF (postop day 1 )  · Further plan per Orthopedic surgery

## 2022-03-22 NOTE — PHYSICAL THERAPY NOTE
Physical Therapy Evaluation   Time in: 827  Time out: 852  Total evaluation time: 25 minutes    Patient's Name: Terrell Brooke    Admitting Diagnosis  Other closed fracture of distal end of left femur, initial encounter [facility]) [P47 [id number]]    Problem List  Patient Active Problem List   Diagnosis    Intractable migraine without aura and without status migrainosus    Closed fracture of distal end of left femur with routine healing    Postoperative hypoxia    Postoperative pain    Migraine       Past Medical History  Past Medical History:   Diagnosis Date    Arthritis     Migraines        Past Surgical History  Past Surgical History:   Procedure Laterality Date    CHOLECYSTECTOMY      GALLBLADDER SURGERY      JOINT REPLACEMENT      PATELLA SURGERY  2016    Patella replacement on left knee    FL OPEN TX FEMORAL FRACTURE DISTAL MED/LAT CONDYLE Left 3/21/2022    Procedure: OPEN REDUCTION W/ INTERNAL FIXATION (ORIF) LEFT DISTAL FEMUR FRACTURE;  Surgeon: Johnathan Santizo MD;  Location: [facility];  Service: Orthopedics    REPLACEMENT TOTAL KNEE Left     TONSILECTOMY AND ADNOIDECTOMY  1971    Tonsilectomy only   [address]       PT performed at least 2 patient identifiers during session: Name and wristband  03/22/22 0827   PT Last Visit   PT Visit Date 03/22/22   Note Type   Note type Evaluation   Pain Assessment   Pain Assessment Tool 0-10   Pain Score 7   Pain Location/Orientation Orientation: Left; Location: [facility] Pain Intervention(s) Repositioned; Ambulation/increased activity; Elevated; Rest   Restrictions/Precautions   Weight Bearing Precautions Per Order Yes   LLE Weight Bearing Per Order NWB   Braces or Orthoses LE Immobilizer  (knee immobilizer)   Other Precautions WBS; Chair Alarm; Bed Alarm;Multiple lines; Fall Risk;Pain   Home Living   Type of [address] [place] [address] Two level;Stairs to enter with rails;Bed/bath upstairs  (5 ISAI via garage c B HR   FOS to 2nd floor c U/L handrail)   Bathroom Shower/Tub Tub/shower unit  (walk in shower on 1st floor optional)   [address] chair   Home Equipment Crutches;Walker  (no AD used at baseline, crutches used since injury on 3/16)   Additional Comments pt reports she plans to be able to access 2nd floor, does have full bathroom on 1st floor & could sleep on the couch   Prior Function   Level of [place] Independent with ADLs and functional mobility   Lives With Son;Family  (son's girlfriend home throughout the day)   Ady Simon 31 in the last 6 months 1 to 4   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, full time)   Comments (+)    General   Family/Caregiver Present No   Cognition   Overall Cognitive Status WFL   Arousal/Participation Alert   Orientation Level Oriented X4   Memory Within functional limits   Following Commands Follows all commands and directions without difficulty   Comments pt agreeable to PT eval   Subjective   Subjective "I can get up"   RUE Assessment   RUE Assessment   (defer to OT eval for comments)   LUE Assessment   LUE Assessment   (defer to OT eval for comments)   RLE Assessment   RLE Assessment WFL   LLE Assessment   LLE Assessment   (did not MMT, POD1)   Coordination   Movements are Fluid and Coordinated 1   Sensation WFL   Light Touch   RLE Light Touch Grossly intact   LLE Light Touch Grossly intact   Bed Mobility   Supine to Sit 4  Minimal assistance   Additional items Assist x 1;HOB elevated; Increased time required;Verbal cues;LE management   Additional Comments +dizziness reported with transitional changes, vc for PLB/breathing technique, subsided once seated in chair   Transfers   Sit to Stand 4  Minimal assistance  (CGA)   Additional items Assist x 1; Increased time required;Verbal cues;Armrests   Stand to Sit 4  Minimal assistance  (CGA)   Additional items Assist x 1; Increased time required;Verbal cues;Armrests   Ambulation/Elevation   Gait pattern Decreased foot clearance; Short stride  (short gentle hops on R LE)   Gait Assistance 4  Minimal assist  (CGA)   Additional items Assist x 1;Verbal cues   Assistive Device Rolling walker   Distance 5'   Ambulation/Elevation Additional Comments pt's HR elevated to 128bpm during functional mobility/  short gait trial from EOB to recliner  further distance declined at this time   Balance   Static Sitting Good   Dynamic Sitting Fair +   Static Standing Fair   Dynamic Standing Fair -   Ambulatory Fair -   Endurance Deficit   Endurance Deficit No   Activity Tolerance   Activity Tolerance Patient limited by fatigue;Patient limited by pain; Other (Comment)  (elevated HR, +dizziness)   Medical Staff Made Aware care coordination with OT Marylen Cool   Nurse Made Aware RN Jose verbalized pt appropriate to see, made aware of session outcome/recs   Assessment   Prognosis Excellent   Problem List Decreased strength;Decreased range of motion;Decreased endurance; Impaired balance;Decreased mobility;Pain;Orthopedic restrictions   Assessment Pt is 61 y o  female seen for high-complexity PT evaluation on 3/22/2022 s/p admit to [facility] on 3/21/2022 w/ Postoperative hypoxia  PT was consulted to assess pt's functional mobility and d/c needs  Order placed for PT eval and tx, w/ up w/ A and NWB L LE order  PTA, pt resides with son and family in [facility] c 5 ISAI, ambulates typically without AD, using crutches since injury on 3/16/22  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, (+)   At time of eval, pt requiring min A for bed mobility, CGA for transfers and short gait trial from EOB to recliner c use of RW  Upon evaluation, pt presenting with impaired functional mobility d/t decreased strength, decreased ROM, impaired balance, decreased mobility, orthopedic restrictions of POD#1 left distal femur ORIF, pain and activity intolerance   Pertinent PMHx and current co-morbidities affecting pt's physical performance at time of assessment include: postoperative hypoxia, closed fracture of L femur, postoperative pain, migraine  Personal factors affecting pt at time of eval include: inaccessible home environment, lives in 2 story house, ambulating w/ assistive device, stairs to enter home, inability to navigate community distances and positive fall history  The following objective measures performed on IE also reveal limitations: Barthel Index: 50/100, Modified Sheryl: 4 (moderate/severe disability) and AM-PAC 6-Clicks: 54/91  Pt's clinical presentation is currently unstable/unpredictable seen in pt's presentation of abnormal lab value(s), need for input for task focus and mobility technique, L knee pain impacting overall mobility status, ongoing medical assessment and elevated HR c mobility  Overall, pt's rehab potential and prognosis to return to PLOF is excellent as impacted by objective findings, warranting pt to receive further skilled PT interventions to address identified impairments, activity limitation(s), and participation restriction(s)  Pt to benefit from continued PT tx to address deficits as defined above and maximize level of functional independent mobility and consistency  From PT/mobility standpoint, recommendation at time of d/c would be home with home health rehabilitation pending progress in order to facilitate return to PLOF     Barriers to Discharge Inaccessible home environment   Goals   Patient Goals to go home   STG Expiration Date 04/01/22   Short Term Goal #1 In 7-10 days: Perform all bed mobility tasks modified independent to decrease caregiver burden, Perform all transfers modified independent to improve independence while maintaining NWB % of the time, Ambulate > 50 ft  with least restrictive assistive device modified independent w/o LOB and w/ normalized gait pattern 100% of the time while maintaining NWB % of the time, Navigate 5 stairs modified independent with bilateral handrails to facilitate return to previous living environment to stay on 1st floor set up option while maintaining NWB % of the time, Increase all balance 1/2 grade to decrease risk for falls, Complete exercise program independently and PT provider will perform functional balance assessment to determine fall risk   PT Treatment Day 0   Plan   Treatment/Interventions Functional transfer training;LE strengthening/ROM; Therapeutic exercise;Patient/family training;Equipment eval/education; Bed mobility;Gait training;Spoke to nursing;Elevations   PT Frequency 5-7x/wk   Recommendation   PT Discharge Recommendation Home with home health rehabilitation   Equipment Recommended   (RW vs crutches)   AM-PAC Basic Mobility Inpatient   Turning in Bed Without Bedrails 3   Lying on Back to Sitting on Edge of Flat Bed 3   Moving Bed to Chair 3   Standing Up From Chair 3   Walk in Room 3   Climb 3-5 Stairs 2   Basic Mobility Inpatient Raw Score 17   Basic Mobility Standardized Score 39 67   Highest Level Of Mobility   -[facility] Goal 5: Stand one or more mins   -[facility] Highest Level of Mobility 4: Move to chair/commode   -[facility] Goal Achieved No   Modified [place] Scale   Modified Sheryl Scale 4   Barthel Index   Feeding 10   Bathing 0   Grooming Score 5   Dressing Score 5   Bladder Score 5   Bowels Score 10   Toilet Use Score 5   Transfers (Bed/Chair) Score 10   Mobility (Level Surface) Score 0   Stairs Score 0   Barthel Index Score 50       Vinita Lambert, PT, DPT

## 2022-03-22 NOTE — PLAN OF CARE
Problem: Prexisting or High Potential for Compromised Skin Integrity  Goal: Skin integrity is maintained or improved  Description: INTERVENTIONS:  - Identify patients at risk for skin breakdown  - Assess and monitor skin integrity  - Assess and monitor nutrition and hydration status  - Monitor labs   - Assess for incontinence   - Turn and reposition patient  - Assist with mobility/ambulation  - Relieve pressure over bony prominences  - Avoid friction and shearing  - Provide appropriate hygiene as needed including keeping skin clean and dry  - Evaluate need for skin moisturizer/barrier cream  - Collaborate with interdisciplinary team   - Patient/family teaching  - Consider wound care consult   Outcome: Progressing     Problem: PAIN - ADULT  Goal: Verbalizes/displays adequate comfort level or baseline comfort level  Description: Interventions:  - Encourage patient to monitor pain and request assistance  - Assess pain using appropriate pain scale  - Administer analgesics based on type and severity of pain and evaluate response  - Implement non-pharmacological measures as appropriate and evaluate response  - Consider cultural and social influences on pain and pain management  - Notify physician/advanced practitioner if interventions unsuccessful or patient reports new pain  Outcome: Progressing     Problem: INFECTION - ADULT  Goal: Absence or prevention of progression during hospitalization  Description: INTERVENTIONS:  - Assess and monitor for signs and symptoms of infection  - Monitor lab/diagnostic results  - Monitor all insertion sites, i e  indwelling lines, tubes, and drains  - Monitor endotracheal if appropriate and nasal secretions for changes in amount and color  - [place] appropriate cooling/warming therapies per order  - Administer medications as ordered  - Instruct and encourage patient and family to use good hand hygiene technique  - Identify and instruct in appropriate isolation precautions for identified infection/condition  Outcome: Progressing  Goal: Absence of fever/infection during neutropenic period  Description: INTERVENTIONS:  - Monitor WBC    Outcome: Progressing     Problem: SAFETY ADULT  Goal: Patient will remain free of falls  Description: INTERVENTIONS:  - Educate patient/family on patient safety including physical limitations  - Instruct patient to call for assistance with activity   - Consult OT/PT to assist with strengthening/mobility   - Keep Call bell within reach  - Keep bed low and locked with side rails adjusted as appropriate  - Keep care items and personal belongings within reach  - Initiate and maintain comfort rounds  - Make Fall Risk Sign visible to staff  - Offer Toileting every  Hours, in advance of need  - Initiate/Maintain alarm  - Obtain necessary fall risk management equipment:   - Apply yellow socks and bracelet for high fall risk patients  - Consider moving patient to room near nurses station  Outcome: Progressing  Goal: Maintain or return to baseline ADL function  Description: INTERVENTIONS:  -  Assess patient's ability to carry out ADLs; assess patient's baseline for ADL function and identify physical deficits which impact ability to perform ADLs (bathing, care of mouth/teeth, toileting, grooming, dressing, etc )  - Assess/evaluate cause of self-care deficits   - Assess range of motion  - Assess patient's mobility; develop plan if impaired  - Assess patient's need for assistive devices and provide as appropriate  - Encourage maximum independence but intervene and supervise when necessary  - Involve family in performance of ADLs  - Assess for home care needs following discharge   - Consider OT consult to assist with ADL evaluation and planning for discharge  - Provide patient education as appropriate  Outcome: Progressing  Goal: Maintains/Returns to pre admission functional level  Description: INTERVENTIONS:  - Perform BMAT or MOVE assessment daily    - Set and communicate daily mobility goal to care team and patient/family/caregiver  - Collaborate with rehabilitation services on mobility goals if consulted  - Perform Range of Motion  times a day  - Reposition patient every hours  - Dangle patient  times a day  - Stand patient  times a day  - Ambulate patient  times a day  - Out of bed to chair times a day   - Out of bed for meals  times a day  - Out of bed for toileting  - Record patient progress and toleration of activity level   Outcome: Progressing     Problem: DISCHARGE PLANNING  Goal: Discharge to home or other facility with appropriate resources  Description: INTERVENTIONS:  - Identify barriers to discharge w/patient and caregiver  - Arrange for needed discharge resources and transportation as appropriate  - Identify discharge learning needs (meds, wound care, etc )  - Arrange for interpretive services to assist at discharge as needed  - Refer to Case Management Department for coordinating discharge planning if the patient needs post-hospital services based on physician/advanced practitioner order or complex needs related to functional status, cognitive ability, or social support system  Outcome: Progressing     Problem: Knowledge Deficit  Goal: Patient/family/caregiver demonstrates understanding of disease process, treatment plan, medications, and discharge instructions  Description: Complete learning assessment and assess knowledge base    Interventions:  - Provide teaching at level of understanding  - Provide teaching via preferred learning methods  Outcome: Progressing     Problem: MOBILITY - ADULT  Goal: Maintain or return to baseline ADL function  Description: INTERVENTIONS:  -  Assess patient's ability to carry out ADLs; assess patient's baseline for ADL function and identify physical deficits which impact ability to perform ADLs (bathing, care of mouth/teeth, toileting, grooming, dressing, etc )  - Assess/evaluate cause of self-care deficits   - Assess range of motion  - Assess patient's mobility; develop plan if impaired  - Assess patient's need for assistive devices and provide as appropriate  - Encourage maximum independence but intervene and supervise when necessary  - Involve family in performance of ADLs  - Assess for home care needs following discharge   - Consider OT consult to assist with ADL evaluation and planning for discharge  - Provide patient education as appropriate  Outcome: Progressing  Goal: Maintains/Returns to pre admission functional level  Description: INTERVENTIONS:  - Perform BMAT or MOVE assessment daily    - Set and communicate daily mobility goal to care team and patient/family/caregiver  - Collaborate with rehabilitation services on mobility goals if consulted  - Perform Range of Motion  times a day  - Reposition patient every  hours    - Dangle patient times a day  - Stand patient  times a day  - Ambulate patient  times a day  - Out of bed to chair  times a day   - Out of bed for meals  times a day  - Out of bed for toileting  - Record patient progress and toleration of activity level   Outcome: Progressing     Problem: Potential for Falls  Goal: Patient will remain free of falls  Description: INTERVENTIONS:  - Educate patient/family on patient safety including physical limitations  - Instruct patient to call for assistance with activity   - Consult OT/PT to assist with strengthening/mobility   - Keep Call bell within reach  - Keep bed low and locked with side rails adjusted as appropriate  - Keep care items and personal belongings within reach  - Initiate and maintain comfort rounds  - Make Fall Risk Sign visible to staff  - Offer Toileting every  Hours, in advance of need  - Initiate/Maintainalarm  - Obtain necessary fall risk management equipment:   - Apply yellow socks and bracelet for high fall risk patients  - Consider moving patient to room near nurses station  Outcome: Progressing     Problem: Prexisting or High Potential for Compromised Skin Integrity  Goal: Skin integrity is maintained or improved  Description: INTERVENTIONS:  - Identify patients at risk for skin breakdown  - Assess and monitor skin integrity  - Assess and monitor nutrition and hydration status  - Monitor labs   - Assess for incontinence   - Turn and reposition patient  - Assist with mobility/ambulation  - Relieve pressure over bony prominences  - Avoid friction and shearing  - Provide appropriate hygiene as needed including keeping skin clean and dry  - Evaluate need for skin moisturizer/barrier cream  - Collaborate with interdisciplinary team   - Patient/family teaching  - Consider wound care consult   Outcome: Progressing     Problem: PAIN - ADULT  Goal: Verbalizes/displays adequate comfort level or baseline comfort level  Description: Interventions:  - Encourage patient to monitor pain and request assistance  - Assess pain using appropriate pain scale  - Administer analgesics based on type and severity of pain and evaluate response  - Implement non-pharmacological measures as appropriate and evaluate response  - Consider cultural and social influences on pain and pain management  - Notify physician/advanced practitioner if interventions unsuccessful or patient reports new pain  Outcome: Progressing     Problem: INFECTION - ADULT  Goal: Absence or prevention of progression during hospitalization  Description: INTERVENTIONS:  - Assess and monitor for signs and symptoms of infection  - Monitor lab/diagnostic results  - Monitor all insertion sites, i e  indwelling lines, tubes, and drains  - Monitor endotracheal if appropriate and nasal secretions for changes in amount and color  - [place] appropriate cooling/warming therapies per order  - Administer medications as ordered  - Instruct and encourage patient and family to use good hand hygiene technique  - Identify and instruct in appropriate isolation precautions for identified infection/condition  Outcome: Progressing  Goal: Absence of fever/infection during neutropenic period  Description: INTERVENTIONS:  - Monitor WBC    Outcome: Progressing     Problem: SAFETY ADULT  Goal: Patient will remain free of falls  Description: INTERVENTIONS:  - Educate patient/family on patient safety including physical limitations  - Instruct patient to call for assistance with activity   - Consult OT/PT to assist with strengthening/mobility   - Keep Call bell within reach  - Keep bed low and locked with side rails adjusted as appropriate  - Keep care items and personal belongings within reach  - Initiate and maintain comfort rounds  - Make Fall Risk Sign visible to staff  - Offer Toileting every  Hours, in advance of need  - Initiate/Maintain alarm  - Obtain necessary fall risk management equipment:   - Apply yellow socks and bracelet for high fall risk patients  - Consider moving patient to room near nurses station  Outcome: Progressing  Goal: Maintain or return to baseline ADL function  Description: INTERVENTIONS:  -  Assess patient's ability to carry out ADLs; assess patient's baseline for ADL function and identify physical deficits which impact ability to perform ADLs (bathing, care of mouth/teeth, toileting, grooming, dressing, etc )  - Assess/evaluate cause of self-care deficits   - Assess range of motion  - Assess patient's mobility; develop plan if impaired  - Assess patient's need for assistive devices and provide as appropriate  - Encourage maximum independence but intervene and supervise when necessary  - Involve family in performance of ADLs  - Assess for home care needs following discharge   - Consider OT consult to assist with ADL evaluation and planning for discharge  - Provide patient education as appropriate  Outcome: Progressing  Goal: Maintains/Returns to pre admission functional level  Description: INTERVENTIONS:  - Perform BMAT or MOVE assessment daily    - Set and communicate daily mobility goal to care team and patient/family/caregiver  - Collaborate with rehabilitation services on mobility goals if consulted  - Perform Range of Motion  times a day  - Reposition patient every hours  - Dangle patient  times a day  - Stand patient  times a day  - Ambulate patient times a day  - Out of bed to chair  times a day   - Out of bed for meals  times a day  - Out of bed for toileting  - Record patient progress and toleration of activity level   Outcome: Progressing     Problem: DISCHARGE PLANNING  Goal: Discharge to home or other facility with appropriate resources  Description: INTERVENTIONS:  - Identify barriers to discharge w/patient and caregiver  - Arrange for needed discharge resources and transportation as appropriate  - Identify discharge learning needs (meds, wound care, etc )  - Arrange for interpretive services to assist at discharge as needed  - Refer to Case Management Department for coordinating discharge planning if the patient needs post-hospital services based on physician/advanced practitioner order or complex needs related to functional status, cognitive ability, or social support system  Outcome: Progressing     Problem: Knowledge Deficit  Goal: Patient/family/caregiver demonstrates understanding of disease process, treatment plan, medications, and discharge instructions  Description: Complete learning assessment and assess knowledge base    Interventions:  - Provide teaching at level of understanding  - Provide teaching via preferred learning methods  Outcome: Progressing     Problem: MOBILITY - ADULT  Goal: Maintain or return to baseline ADL function  Description: INTERVENTIONS:  -  Assess patient's ability to carry out ADLs; assess patient's baseline for ADL function and identify physical deficits which impact ability to perform ADLs (bathing, care of mouth/teeth, toileting, grooming, dressing, etc )  - Assess/evaluate cause of self-care deficits   - Assess range of motion  - Assess patient's mobility; develop plan if impaired  - Assess patient's need for assistive devices and provide as appropriate  - Encourage maximum independence but intervene and supervise when necessary  - Involve family in performance of ADLs  - Assess for home care needs following discharge   - Consider OT consult to assist with ADL evaluation and planning for discharge  - Provide patient education as appropriate  Outcome: Progressing  Goal: Maintains/Returns to pre admission functional level  Description: INTERVENTIONS:  - Perform BMAT or MOVE assessment daily    - Set and communicate daily mobility goal to care team and patient/family/caregiver  - Collaborate with rehabilitation services on mobility goals if consulted  - Perform Range of Motion  times a day  - Reposition patient every hours    - Dangle patient  times a day  - Stand patient  times a day  - Ambulate patient  times a day  - Out of bed to chair  times a day   - Out of bed for meals  times a day  - Out of bed for toileting  - Record patient progress and toleration of activity level   Outcome: Progressing     Problem: Potential for Falls  Goal: Patient will remain free of falls  Description: INTERVENTIONS:  - Educate patient/family on patient safety including physical limitations  - Instruct patient to call for assistance with activity   - Consult OT/PT to assist with strengthening/mobility   - Keep Call bell within reach  - Keep bed low and locked with side rails adjusted as appropriate  - Keep care items and personal belongings within reach  - Initiate and maintain comfort rounds  - Make Fall Risk Sign visible to staff  - Offer Toileting every Hours, in advance of need  - Initiate/Maintain alarm  - Obtain necessary fall risk management equipment:   - Apply yellow socks and bracelet for high fall risk patients  - Consider moving patient to room near nurses station  Outcome: Progressing

## 2022-03-22 NOTE — DISCHARGE SUMMARY
Discharge Summary - Orthopedics   Supa Arzola 61 y o  female MRN: [id number]  Unit/Bed#: -01    Attending Physician: Dr Maral Alvarenga     Admitting diagnosis: Other closed fracture of distal end of left femur, initial encounter [facility]) Yue Raygoza    Discharge diagnosis: Other closed fracture of distal end of left femur, initial encounter [facility]) Yue Raygoza    Date of admission: 3/21/2022    Date of discharge: 03/22/22    Procedure:  Open reduction internal fixation left distal femur    HPI & Hospital course:  61 y o  female scheduled for knee left distal femur ORIF secondary to mechanical fall on 3/16/22  Prior to surgery the risks and benefits of surgery were explained and informed consent was obtained  Surgery went without complications and pt was discharged to the PACU  While in PACU, patient had transient episodes of hypoxia secondary to narcotics use  Patient admitted to the floor with any internal medicine consult for pain control, hypoxia  Lab work stable overnight  Patient's pain level improved with oral pain medication  On discharge date pt was cleared by PT and the medicine team and determined to be safe for discharge  Daily discussion was had with the patient, nursing staff, orthopaedic team, and family members if present  All questions were answered to the patients satisifaction  0   Lab Value Date/Time    HGB 10 5 (L) 03/22/2022 0458    HGB 11 4 (L) 03/21/2022 1949    HGB 14 3 03/16/2022 1634       Greater than 2 gram decrease in Hb qualifies for diagnosis of acute blood loss anemia  Vital signs remained stable and pt was resuscitated with IVF as needed  Body mass index is 24 71 kg/m²  mildly obese  Recommend behavior modifications, nutrition and physical activity  Discharge Instructions:   · NONWEIGHTBEARING LEFT LOWER EXTREMITY  · Maintain knee immobilize at all times  May d/c after 7 days for gentle range of motion exercises of knee  · remove dressings in 7 days    May shower at that time  · Complete DVT prophylaxis as prescribed  (aspirin 81mg twice daily)   · Physical therapy  · Follow-up as scheduled, otherwise call for appt  (2 weeks post op)     Discharge Medications: For the complete list of discharge medications, please refer to the patient's medication reconciliation

## 2022-03-22 NOTE — ANESTHESIA PREPROCEDURE EVALUATION
Procedure:  OPEN REDUCTION W/ INTERNAL FIXATION (ORIF) LEFT DISTAL FEMUR FRACTURE (Left Leg Upper)    Relevant Problems   CARDIO   (+) Intractable migraine without aura and without status migrainosus   (+) Migraine      NEURO/PSYCH   (+) Intractable migraine without aura and without status migrainosus   (+) Migraine        Physical Exam    Airway    Mallampati score: II  TM Distance: >3 FB  Neck ROM: full     Dental   No notable dental hx     Cardiovascular      Pulmonary      Other Findings        Anesthesia Plan  ASA Score- 2     Anesthesia Type- general with ASA Monitors  Additional Monitors:   Airway Plan: LMA  Comment: Patient seen and examined, history reviewed  Patient to be done under general anesthesia with LMA and routine monitors  Adductor canal block for post-op pain control  Risks discussed with the patient, consent obtained          Plan Factors-Exercise tolerance (METS): >4 METS  Chart reviewed  Patient summary reviewed  Induction- intravenous  Postoperative Plan-     Informed Consent- Anesthetic plan and risks discussed with patient  I personally reviewed this patient with the CRNA  Discussed and agreed on the Anesthesia Plan with the CRNA  Tonya Porras

## 2022-04-02 DIAGNOSIS — S72.492D OTHER CLOSED FRACTURE OF DISTAL END OF LEFT FEMUR WITH ROUTINE HEALING, SUBSEQUENT ENCOUNTER: Primary | ICD-10-CM

## 2022-04-05 ENCOUNTER — OFFICE VISIT (OUTPATIENT)
Dept: OBGYN CLINIC | Facility: CLINIC | Age: 57
End: 2022-04-05

## 2022-04-05 ENCOUNTER — APPOINTMENT (OUTPATIENT)
Dept: RADIOLOGY | Facility: CLINIC | Age: 57
End: 2022-04-05
Payer: COMMERCIAL

## 2022-04-05 VITALS
DIASTOLIC BLOOD PRESSURE: 78 MMHG | SYSTOLIC BLOOD PRESSURE: 152 MMHG | HEART RATE: 76 BPM | WEIGHT: 143 LBS | HEIGHT: 64 IN | BODY MASS INDEX: 24.41 KG/M2

## 2022-04-05 DIAGNOSIS — S72.492A OTHER CLOSED FRACTURE OF DISTAL END OF LEFT FEMUR, INITIAL ENCOUNTER (HCC): Primary | ICD-10-CM

## 2022-04-05 DIAGNOSIS — Z87.81 S/P ORIF (OPEN REDUCTION INTERNAL FIXATION) FRACTURE: ICD-10-CM

## 2022-04-05 DIAGNOSIS — S72.492D OTHER CLOSED FRACTURE OF DISTAL END OF LEFT FEMUR WITH ROUTINE HEALING, SUBSEQUENT ENCOUNTER: ICD-10-CM

## 2022-04-05 DIAGNOSIS — Z98.890 S/P ORIF (OPEN REDUCTION INTERNAL FIXATION) FRACTURE: ICD-10-CM

## 2022-04-05 PROBLEM — S72.402A CLOSED FRACTURE OF LEFT DISTAL FEMUR (HCC): Status: ACTIVE | Noted: 2022-03-21

## 2022-04-05 PROCEDURE — 99024 POSTOP FOLLOW-UP VISIT: CPT | Performed by: ORTHOPAEDIC SURGERY

## 2022-04-05 PROCEDURE — 73560 X-RAY EXAM OF KNEE 1 OR 2: CPT

## 2022-04-05 RX ORDER — OXYCODONE HYDROCHLORIDE 5 MG/1
5 TABLET ORAL EVERY 6 HOURS PRN
Qty: 28 TABLET | Refills: 0 | Status: SHIPPED | OUTPATIENT
Start: 2022-04-05 | End: 2022-08-09 | Stop reason: ALTCHOICE

## 2022-04-05 NOTE — PATIENT INSTRUCTIONS
- continue nonweightbearing left lower extremity  - Sutures removed in the office  Patient tolerated well  - discontinue immobilizer at this time  Cleared for gentle range-of-motion exercises of the left knee  - Okay To begin showering  Allow water to flow over the incision sites    Do not vigorously scrubbed or soak wounds until otherwise stated   - continue aspirin for DVT prophylaxis  - Over the counter analgesics as needed / directed   - Ice / heat as directed   - Follow up 4 weeks with repeat XR

## 2022-04-05 NOTE — PROGRESS NOTES
Orthopaedics Office Visit - Post-op Patient Visit    ASSESSMENT/PLAN:    Assessment:   15 days s/p ORIF left distal femur   DOS 3/21/22   Doing well      Plan:   - continue nonweightbearing left lower extremity  - Sutures removed in the office  Patient tolerated well  - discontinue immobilizer at this time  Cleared for gentle range-of-motion exercises of the left knee - will refer to formal PT to regain knee ROM  - Okay To begin showering  Allow water to flow over the incision sites  Do not vigorously scrubbed or soak wounds until otherwise stated   - continue aspirin for DVT prophylaxis  - Over the counter analgesics as needed / directed   - Ice / heat as directed   - Follow up 4 weeks with repeat XR       To Do Next Visit:  left knee XR     _____________________________________________________  CHIEF COMPLAINT:  Chief Complaint   Patient presents with    Left Leg - Post-op         SUBJECTIVE:  Sekou Shaw is a 61 y o  female who presents  15 days s/p ORIF left distal femur   DOS 3/21/22  Patient states that her leg is doing well overall  Patient states she is minimal pain in her leg currently  Patient states she takes oxycodone as needed for pain with good results  Patient states that she has been weaning from her knee immobilizer as directed  Patient states she has been compliant with aspirin for DVT prophylaxis  Patient denies any numbness or tingling in her leg but does admit to having some swelling over the incision site  No other complaints noted at this time          SOCIAL HISTORY:  Social History     Tobacco Use    Smoking status: Never Smoker    Smokeless tobacco: Never Used   Vaping Use    Vaping Use: Never used   Substance Use Topics    Alcohol use: Never     Alcohol/week: 0 0 standard drinks    Drug use: Never       MEDICATIONS:    Current Outpatient Medications:     Ascorbic Acid (VITAMIN C ADULT GUMMIES PO), Take 500 mg by mouth daily, Disp: , Rfl:     aspirin (ECOTRIN LOW STRENGTH) 81 mg EC tablet, Take 1 tablet (81 mg total) by mouth every 12 (twelve) hours, Disp: 84 tablet, Rfl: 0    cholecalciferol (VITAMIN D3) 400 units tablet, Take 400 Units by mouth daily, Disp: , Rfl:     Multiple Vitamins-Minerals (MULTIVITAMIN GUMMIES ADULT PO), Take by mouth daily, Disp: , Rfl:     naproxen sodium (ALEVE) 220 MG tablet, Take 220 mg by mouth once as needed , Disp: , Rfl:     oxyCODONE (Roxicodone) 5 immediate release tablet, Take 1 tablet (5 mg total) by mouth every 4 (four) hours as needed for moderate pain Max Daily Amount: 30 mg, Disp: 30 tablet, Rfl: 0    rizatriptan (MAXALT) 5 MG tablet, Take 5 mg by mouth once as needed for migraine May repeat in 2 hours if needed, Disp: , Rfl:     topiramate (TOPAMAX) 25 mg tablet, Take 1 tablet (25 mg total) by mouth 2 (two) times a day, Disp: 60 tablet, Rfl: 4    vitamin E, tocopherol, 1,000 units capsule, Take 1,000 Units by mouth daily, Disp: , Rfl:     REVIEW OF SYSTEMS:  MSK: left knee pain   Neuro: WNL   Pertinent items are otherwise noted in HPI  A comprehensive review of systems was otherwise negative     _____________________________________________________  PHYSICAL EXAMINATION:  Vital signs: /78   Pulse 76   Ht 5' 4" (1 626 m)   Wt 64 9 kg (143 lb)   BMI 24 55 kg/m²   General: No acute distress, awake and alert  Psychiatric: Mood and affect appear appropriate  HEENT: Trachea Midline, No torticollis, no apparent facial trauma  Cardiovascular: No audible murmurs; Extremities appear perfused  Pulmonary: No audible wheezing or stridor  Skin: No open lesions; see further details (if any) below      MUSCULOSKELETAL EXAMINATION:  Left knee examination:  - Patient sitting comfortably in the office in no acute distress   - healed incision site noted over the medial aspect of the knee  No other he was well abnormalities present  Sutures intact  No surrounding erythema ecchymosis present    - mild tenderness palpation noted incision site  No other bony or soft tissue tenderness palpation noted at this time  - 0-45 degrees range of motion noted limited by pain   - NV intact    _____________________________________________________  STUDIES REVIEWED:  I personally reviewed the images and interpretation is as follows:  Left knee XR 2 views:  Healing distal femur fracture in acceptable anatomic alignment with hardware intact pre      PROCEDURES PERFORMED:  Suture removal    Date/Time: 4/5/2022 3:54 PM  Performed by: Isabelle Rowley PA-C  Authorized by: Stephanie Starkey MD   Universal Protocol:  Consent: Verbal consent obtained  Risks and benefits: risks, benefits and alternatives were discussed  Consent given by: patient  Patient understanding: patient states understanding of the procedure being performed  Site marked: the operative site was marked  Patient identity confirmed: verbally with patient        Patient location:  Bedside  Location:     Laterality:  Left    Location:  Lower extremity    Lower extremity location:  Knee    Knee location:  L knee  Procedure details: Tools used:  Suture removal kit    Wound appearance:  No sign(s) of infection, clean and good wound healing  Post-procedure details:     Post-removal:  Steri-Strips applied    Patient tolerance of procedure: Tolerated well, no immediate complications            Juan Whitehead PA-C - assisting    Stephanie Starkey MD            Portions of the record may have been created with voice recognition software  Occasional wrong word or "sound a like" substitutions may have occurred due to the inherent limitations of voice recognition software  Read the chart carefully and recognize, using context, where substitutions have occurred

## 2022-04-12 ENCOUNTER — EVALUATION (OUTPATIENT)
Dept: PHYSICAL THERAPY | Facility: CLINIC | Age: 57
End: 2022-04-12
Payer: COMMERCIAL

## 2022-04-12 DIAGNOSIS — S72.492A OTHER CLOSED FRACTURE OF DISTAL END OF LEFT FEMUR, INITIAL ENCOUNTER (HCC): Primary | ICD-10-CM

## 2022-04-12 PROCEDURE — 97110 THERAPEUTIC EXERCISES: CPT

## 2022-04-12 PROCEDURE — 97161 PT EVAL LOW COMPLEX 20 MIN: CPT

## 2022-04-12 NOTE — PROGRESS NOTES
PT Evaluation     Today's date: 2022  Patient name: Cori Luke  : 1965  MRN: [id number]  Referring provider: Josie Snow MD  Dx:   Encounter Diagnosis     ICD-10-CM    1  Other closed fracture of distal end of left femur, initial encounter [facility])  S72 492A Ambulatory Referral to Physical Therapy                  Assessment  Assessment details: Cori Luke is a 61 y o  female presenting to physical therapy for an initial evaluation with a MD referral of s/p ORIF left distal femur on 3/21/22  The patient demonstrates decreased left knee ROM, lower extremity strength, gait dysfunction, and impairments in balance/stability on the left lower extremity  The patient is NWBing with use of B/L axillary crutches and/or RW and has been compliant with her restrictions  These deficits have limited the patient's ability to complete ADLs, avocational responsibilities, and recreational activities  This patient would benefit from OP PT services to address their impairments and functional limitations to maximize functional mobility  The patient was provided a HEP and demonstrated/verbalized understanding  Impairments: abnormal gait, abnormal or restricted ROM, activity intolerance, impaired balance, impaired physical strength, lacks appropriate home exercise program, pain with function and weight-bearing intolerance    Symptom irritability: moderateUnderstanding of Dx/Px/POC: excellent   Prognosis: good    Goals  STG to be achieved in 4 weeks: The patient will report a decrease in left knee "at worst" subjective pain rating score to at least 5/10 to allow for improved tolerance for weightbearing activities  The patient will increase left knee flexion AROM to at least 90 degrees to allow for improved functional mobility  The patient will increase left knee extension MMT score to at least 4/5 to allow for improved functional mobility  LTG to be achieved by DC:    The patient will be independent in comprehensive HEP  The patient will report no pain with usual activities  The patient will improve left knee AROM to [facility] to allow for improved functional mobility  The patient will increase left knee MMT score to [facility] to allow for improved functional mobility  The patient will be able to negotiate the stairs in her home in a reciprocal gait pattern without difficulty  The patient will be able to ambulate one mile without difficulty  Plan  Patient would benefit from: skilled physical therapy  Planned modality interventions: thermotherapy: hydrocollator packs, TENS and cryotherapy  Planned therapy interventions: manual therapy, joint mobilization, balance/weight bearing training, neuromuscular re-education, patient education, flexibility, strengthening, stretching, therapeutic activities, therapeutic exercise, gait training and home exercise program  Frequency: 2x week  Duration in weeks: 12  Plan of Care beginning date: 4/12/2022  Plan of Care expiration date: 7/5/2022  Treatment plan discussed with: patient        Subjective Evaluation    History of Present Illness  Mechanism of injury: Humberto Virgen presents to therapy s/p ORIF left distal femur, DOS 3/21/22  Patient reports that she was injured when she fell down the stairs  Reports that she had a follow up with the surgeon on 4/5/22 where they removed the sutures and also took updated Xrays  Most recent Xray showed, "stable alignment of a healing distal left femoral fracture, status post ORIF  The surgical hardware appears intact"  She was advised that she could DC use of immobilizer brace and to continue with NWBing status until her next follow up  Reports that she is completing stairs at home on her buttocks when ascending and descending  Reports that she utilizes a RW for ambulation at home and uses bilateral axillary crutches when in the community due to greater ease for mobility  Next follow up is 5/3/22    Pain  Current pain rating: 4  At best pain ratin  At worst pain ratin  Location: L knee   Quality: sharp and discomfort  Relieving factors: medications, ice and change in position  Aggravating factors: standing and sitting    Social Support  Steps to enter house: yes  Stairs in house: yes   Lives in: multiple-level home  Lives with: adult children    Employment status: not working  Treatments  Previous treatment: immobilization and medication  Current treatment: physical therapy  Patient Goals  Patient goals for therapy: decreased pain, increased motion, improved balance, independence with ADLs/IADLs and increased strength  Patient goal: start walking again         Objective     Observations     Additional Observation Details  (+) incision on medial aspect of L knee  Approximately 13 cm in length  Well approximated, no signs of infection  Mild yellow ecchymosis at posterior L knee  Active Range of Motion   Left Hip   Flexion: 105 degrees   Abduction: 30 degrees     Right Hip   Flexion: 110 degrees   Abduction: 30 degrees   Left Knee   Flexion: 53 degrees with pain  Extension: 0 degrees with pain    Right Knee   Flexion: WFL  Extension: WFL    Passive Range of Motion   Left Knee   Flexion: 55 degrees with pain    Strength/Myotome Testing     Left Hip   Planes of Motion   Flexion: 3+  Abduction: 4-    Right Hip   Planes of Motion   Flexion: 5  Abduction: 5    Left Knee   Flexion: 3-  Extension: 3    Right Knee   Flexion: 5  Extension: 5    Left Ankle/Foot   Dorsiflexion: 4+  Plantar flexion: 4+    Right Ankle/Foot   Dorsiflexion: 5  Plantar flexion: 5    Ambulation   Weight-Bearing Status   Weight-Bearing Status (Left): non-weight-bearing   Assistive device used: crutches             Precautions: s/p ORIF left distal femur DOS 3/21/22 , Hx L patella replacement 2016  Per ortho visit on 22: "Continue nonweightbearing left lower extremity  Discontinue immobilizer at this time    Cleared for gentle range-of-motion exercises of the left knee"    Access Code: [email or web address]     Manuals 4/12            L knee PROM                                                    Neuro Re-Ed             Quad set with towel under ankle HEP            Quad set with LAQ HEP            Quad set with SLR                                                                 Ther Ex             Heel slides HEP            Seated knee flexion with OP from opp LE HEP            Sidelying SLR                                       Pt education HEP, PT POC                         Ther Activity                                       Gait Training                                       Modalities

## 2022-04-13 ENCOUNTER — OFFICE VISIT (OUTPATIENT)
Dept: PHYSICAL THERAPY | Facility: CLINIC | Age: 57
End: 2022-04-13
Payer: COMMERCIAL

## 2022-04-13 DIAGNOSIS — S72.492A OTHER CLOSED FRACTURE OF DISTAL END OF LEFT FEMUR, INITIAL ENCOUNTER (HCC): Primary | ICD-10-CM

## 2022-04-13 PROCEDURE — 97140 MANUAL THERAPY 1/> REGIONS: CPT | Performed by: PHYSICAL THERAPIST

## 2022-04-13 PROCEDURE — 97112 NEUROMUSCULAR REEDUCATION: CPT | Performed by: PHYSICAL THERAPIST

## 2022-04-13 PROCEDURE — 97110 THERAPEUTIC EXERCISES: CPT | Performed by: PHYSICAL THERAPIST

## 2022-04-13 NOTE — PROGRESS NOTES
Daily Note     Today's date: 2022  Patient name: Brigido Crow  : 1965  MRN: [id number]  Referring provider: Saturnino Amezquita MD  Dx:   Encounter Diagnosis     ICD-10-CM    1  Other closed fracture of distal end of left femur, [facility]  S72 492A        Start Time: 1515  Stop Time: 1600  Total time in clinic (min): 45 minutes    Subjective: Pt reports doing well after her initial evaluation with improved motion of her L knee  Objective: See treatment diary below      Assessment: Tolerated treatment well  Patient demonstrated fatigue post treatment, exhibited good technique with therapeutic exercises and would benefit from continued PT  Continued with quad activation exercises as well as knee flexion mobility exercises, pt able to achieve increased flexion after performance  Pt required min verbal cues to facilitate performance of proper technique  Assess pt response to treatment at next visit  Plan: Continue per plan of care  Precautions: s/p ORIF left distal femur DOS 3/21/22 , Hx L patella replacement 2016  Per ortho visit on 22: "Continue nonweightbearing left lower extremity  Discontinue immobilizer at this time    Cleared for gentle range-of-motion exercises of the left knee"    Access Code: [facility]     Manuals            L knee PROM supine  8'           Seated L knee flexion  5'                                     Neuro Re-Ed             Quad set with towel under ankle HEP 1x15 5"           Quad set with LAQ HEP 2x10           Quad set with SLR  3x5                                                               Ther Ex             Heel slides HEP 2x10 5"           Seated knee flexion with OP from opp LE HEP 1x10 10"           Sidelying SLR  2x10           Hamstring isometrics  1x15 5"                        Pt education HEP, PT POC 5'           SAQ  2x10           Ther Activity                                       Gait Training Modalities

## 2022-04-18 ENCOUNTER — OFFICE VISIT (OUTPATIENT)
Dept: PHYSICAL THERAPY | Facility: CLINIC | Age: 57
End: 2022-04-18
Payer: COMMERCIAL

## 2022-04-18 DIAGNOSIS — S72.492D OTHER CLOSED FRACTURE OF DISTAL END OF LEFT FEMUR WITH ROUTINE HEALING, SUBSEQUENT ENCOUNTER: Primary | ICD-10-CM

## 2022-04-18 DIAGNOSIS — S72.492A OTHER CLOSED FRACTURE OF DISTAL END OF LEFT FEMUR, INITIAL ENCOUNTER (HCC): ICD-10-CM

## 2022-04-18 PROCEDURE — 97140 MANUAL THERAPY 1/> REGIONS: CPT | Performed by: PHYSICAL THERAPIST

## 2022-04-18 PROCEDURE — 97112 NEUROMUSCULAR REEDUCATION: CPT | Performed by: PHYSICAL THERAPIST

## 2022-04-18 PROCEDURE — 97110 THERAPEUTIC EXERCISES: CPT | Performed by: PHYSICAL THERAPIST

## 2022-04-18 NOTE — PROGRESS NOTES
Daily Note     Today's date: 2022  Patient name: Brigido Crow  : 1965  MRN: [id number]  Referring provider: Saturnino Amezquita MD  Dx:   Encounter Diagnosis     ICD-10-CM    1  Other closed fracture of distal end of left femur with routine healing, subsequent encounter  S72 492D    2  Other closed [facility] of distal end of left femur[facility]  S72 492A        Start Time: 1245  Stop Time: 1325  Total time in clinic (min): 40 minutes    Subjective: Pt reports she did well after last therapy session with improved motion of her L knee  However, pt reports having increased stiffness of her R knee this morning  Objective: See treatment diary below      Assessment: Tolerated treatment well  Patient demonstrated fatigue post treatment, exhibited good technique with therapeutic exercises and would benefit from continued PT  Pt was able to progress today by increasing sets of her SLR as well as with inclusion of prone hip extension  Pt continues to have significant impairments of L knee motion during today's session  Pt required min verbal cues to facilitate performance of proper technique  Assess pt response to treatment at next visit  Plan: Continue per plan of care  Precautions: s/p ORIF left distal femur DOS 3/21/22 , Hx L patella replacement 2016  Per ortho visit on 22: "Continue nonweightbearing left lower extremity  Discontinue immobilizer at this time    Cleared for gentle range-of-motion exercises of the left knee"    Access Code: [facility]     Manuals           L knee PROM supine  8' 8'          Seated L knee flexion  5' 5'                                    Neuro Re-Ed             Quad set with towel under ankle HEP 1x15 5" 1x15 5"          Quad set with LAQ HEP 2x10 2x10          Quad set with SLR  3x5 4x5          SAQ  2x10 2x10                                                 Ther Ex             Heel slides HEP 2x10 5" 2x10 "          Seated knee flexion with OP from opp LE HEP 1x10 10" 1x10 10"          Sidelying SLR  2x10 2x10          Hamstring isometrics  1x15 5" 1x15 5"          Prone hip extension   2x10          Pt education HEP, PT POC 5' 5'                       Ther Activity                                       Gait Training                                       Modalities

## 2022-04-20 ENCOUNTER — OFFICE VISIT (OUTPATIENT)
Dept: PHYSICAL THERAPY | Facility: CLINIC | Age: 57
End: 2022-04-20
Payer: COMMERCIAL

## 2022-04-20 DIAGNOSIS — S72.492A OTHER CLOSED FRACTURE OF DISTAL END OF LEFT FEMUR, INITIAL ENCOUNTER (HCC): ICD-10-CM

## 2022-04-20 DIAGNOSIS — S72.492D OTHER CLOSED FRACTURE OF DISTAL END OF LEFT FEMUR WITH ROUTINE HEALING, SUBSEQUENT ENCOUNTER: Primary | ICD-10-CM

## 2022-04-20 PROCEDURE — 97140 MANUAL THERAPY 1/> REGIONS: CPT | Performed by: PHYSICAL THERAPIST

## 2022-04-20 PROCEDURE — 97112 NEUROMUSCULAR REEDUCATION: CPT | Performed by: PHYSICAL THERAPIST

## 2022-04-20 PROCEDURE — 97110 THERAPEUTIC EXERCISES: CPT | Performed by: PHYSICAL THERAPIST

## 2022-04-20 NOTE — PROGRESS NOTES
Daily Note     Today's date: 2022  Patient name: Uriel Beltre  : 1965  MRN: [id number]  Referring provider: Elke Miller MD  Dx:   Encounter Diagnosis     ICD-10-CM    1  Other closed fracture of distal end of left femur with routine healing, subsequent encounter  S72 492D    2  Other closed fracture of distal end of left femur, initial encounter ([facility] )  S72 492A        Start Time: 0753  Stop Time: 0845  Total time in clinic (min): 52 minutes    Subjective: Pt reports doing well after her last therapy session with improved motion of her L knee  Pt reports she did have increased pain of her L knee yesterday due to the weather  Objective: See treatment diary below      Assessment: Tolerated treatment well  Patient demonstrated fatigue post treatment, exhibited good technique with therapeutic exercises and would benefit from continued PT  Initiated knee extension stretch and hamstring stretching today to further improve pt L knee extension  Pt continues to demonstrate significant limitations in motion of L knee flexion at today's session  Pt required min verbal cues to facilitate performance of proper technique  Assess pt response to treatment at next visit  Plan: Continue per plan of care  Precautions: s/p ORIF left distal femur DOS 3/21/22 , Hx L patella replacement 2016  Per ortho visit on 22: "Continue nonweightbearing left lower extremity  Discontinue immobilizer at this time    Cleared for gentle range-of-motion exercises of the left knee"    Access Code: [facility]     Manuals          L knee PROM supine  8' 8' 8'         Seated L knee flexion  5' 5' 5'         Supine knee ext stretch    1x5 10"         Hamstring stretch    1x5 10"         Neuro Re-Ed             Quad set with towel under ankle HEP 1x15 5" 1x15 5" 2x10         Quad set with LAQ HEP 2x10 2x10 2x10         Quad set with SLR  3x5 4x5 5x5         SAQ  2x10 2x10 2x10 Ther Ex             Heel slides HEP 2x10 5" 2x10 " 2x10 5"         Seated knee flexion  HEP 1x10 10" 1x10 10" 1x10         Sidelying SLR  2x10 2x10 2x10         Hamstring isometrics  1x15 5" 1x15 5" 1x15 5"         Prone hip extension   2x10 2x10         Pt education HEP, PT POC 5' 5' 4'                      Ther Activity                                       Gait Training                                       Modalities

## 2022-04-25 ENCOUNTER — OFFICE VISIT (OUTPATIENT)
Dept: PHYSICAL THERAPY | Facility: CLINIC | Age: 57
End: 2022-04-25
Payer: COMMERCIAL

## 2022-04-25 DIAGNOSIS — S72.492D OTHER CLOSED FRACTURE OF DISTAL END OF LEFT FEMUR WITH ROUTINE HEALING, SUBSEQUENT ENCOUNTER: Primary | ICD-10-CM

## 2022-04-25 DIAGNOSIS — S72.492A OTHER CLOSED FRACTURE OF DISTAL END OF LEFT FEMUR, INITIAL ENCOUNTER (HCC): ICD-10-CM

## 2022-04-25 PROCEDURE — 97110 THERAPEUTIC EXERCISES: CPT

## 2022-04-25 PROCEDURE — 97112 NEUROMUSCULAR REEDUCATION: CPT

## 2022-04-25 NOTE — PROGRESS NOTES
Daily Note     Today's date: 2022  Patient name: Dionicio Dyson  : 1965  MRN: [id number]  Referring provider: Altagracia Young MD  Dx:   Encounter Diagnosis     ICD-10-CM    1  Other closed fracture of distal end of left femur with routine healing, subsequent encounter  S72 492D    2  Other closed fracture of distal end of left femur[facility]  S72 492A                   Subjective: Pt reports she has min-mod L knee pain upon arrival to session  Objective: See treatment diary below      Assessment: AROM 10-69* upon arrival to session  PROM is 0-73*  Firm end feel during PROM  Patient with good tolerance to charted exercises  Lacks TKE  Added prone quad set to isolate quads in prone  Able to perform with slight glut compensation  Pt would benefit from continued PT in order to improve L knee ROM and strength within protocol in order to return to PLOF  Plan: Continue per plan of care  Precautions: s/p ORIF left distal femur DOS 3/21/22 , Hx L patella replacement 2016  Per ortho visit on 22: "Continue nonweightbearing left lower extremity  Discontinue immobilizer at this time    Cleared for gentle range-of-motion exercises of the left knee"    Access Code: [facility]     Manuals         L knee PROM supine  8' 8' 8' 8'        Seated L knee flexion  5' 5' 5' 5'        Supine knee ext stretch    1x5 10" 10"x5        Hamstring stretch    1x5 10" 10"x5        Neuro Re-Ed             Quad set with towel under ankle HEP 1x15 5" 1x15 5" 2x10 5"x20        Quad set with LAQ HEP 2x10 2x10 2x10 2x15        Quad set with SLR  3x5 4x5 5x5 4x6        SAQ  2x10 2x10 2x10 2x15        Prone quad set     3"x20                                  Ther Ex             Heel slides HEP 2x10 5" 2x10 " 2x10 5" 2x10 5"        Seated knee flexion  HEP 1x10 10" 1x10 10" 1x10 PTA        Sidelying SLR  2x10 2x10 2x10 2x10        Hamstring isometrics  1x15 5" 1x15 5" 1x15 5"         Prone hip extension   2x10 2x10 2x10        Pt education HEP, PT POC 5' 5' 4'                      Ther Activity                                       Gait Training                                       Modalities

## 2022-04-28 ENCOUNTER — OFFICE VISIT (OUTPATIENT)
Dept: PHYSICAL THERAPY | Facility: CLINIC | Age: 57
End: 2022-04-28
Payer: COMMERCIAL

## 2022-04-28 DIAGNOSIS — S72.492D OTHER CLOSED FRACTURE OF DISTAL END OF LEFT FEMUR WITH ROUTINE HEALING, SUBSEQUENT ENCOUNTER: Primary | ICD-10-CM

## 2022-04-28 DIAGNOSIS — S72.492A OTHER CLOSED FRACTURE OF DISTAL END OF LEFT FEMUR, INITIAL ENCOUNTER (HCC): Primary | ICD-10-CM

## 2022-04-28 PROCEDURE — 97140 MANUAL THERAPY 1/> REGIONS: CPT | Performed by: PHYSICAL THERAPIST

## 2022-04-28 PROCEDURE — 97110 THERAPEUTIC EXERCISES: CPT | Performed by: PHYSICAL THERAPIST

## 2022-04-28 PROCEDURE — 97112 NEUROMUSCULAR REEDUCATION: CPT | Performed by: PHYSICAL THERAPIST

## 2022-04-28 NOTE — PROGRESS NOTES
Daily Note     Today's date: 2022  Patient name: Ang Michele  : 1965  MRN: [id number]  Referring provider: Boris Stein MD  Dx:   Encounter Diagnosis     ICD-10-CM    1  Other closed fracture of distal end of left femur with routine healing, subsequent encounter  S72 492D        Start Time: 1145  Stop Time: 1230  Total time in clinic (min): 45 minutes    Subjective: Pt reports doing well after her last therapy session with improved motion as well as sleeping better at home  Objective: See treatment diary below      Assessment: Tolerated treatment well  Patient demonstrated fatigue post treatment, exhibited good technique with therapeutic exercises and would benefit from continued PT  Pt was able to progress today by increasing sets and reps for her SLR and prone hip extension exercises  Pt continues to have difficulty with L knee flexion at today's session  Pt required min verbal cues to facilitate performance of proper technique  Assess pt response to treatment at next visit  Plan: Continue per plan of care  Precautions: s/p ORIF left distal femur DOS 3/21/22 , Hx L patella replacement 2016  Per ortho visit on 22: "Continue nonweightbearing left lower extremity  Discontinue immobilizer at this time    Cleared for gentle range-of-motion exercises of the left knee"    Access Code: [facility]     Manuals        L knee PROM supine  8' 8' 8' 8' 8'       Seated L knee flexion  5' 5' 5' 5' 5'       Supine knee ext stretch    1x5 10" 10"x5 1x5 10"       Hamstring stretch    1x5 10" 10"x5 1x5 10"       Neuro Re-Ed             Quad set with towel under ankle HEP 1x15 5" 1x15 5" 2x10 5"x20 5"x20       Quad set with LAQ HEP 2x10 2x10 2x10 2x15 3x10       Quad set with SLR  3x5 4x5 5x5 4x6 4x8       SAQ  2x10 2x10 2x10 2x15        Prone quad set     3"x20 3"x20                                 Ther Ex             Heel slides HEP 2x10 5" 2x10 " 2x10 5" 2x10 5" 2x10 5"       Seated knee flexion  HEP 1x10 10" 1x10 10" 1x10 PTA        Sidelying SLR  2x10 2x10 2x10 2x10 2x10       Hamstring isometrics  1x15 5" 1x15 5" 1x15 5"         Prone hip extension   2x10 2x10 2x10 3x10       Pt education HEP, PT POC 5' 5' 4'                      Ther Activity                                       Gait Training                                       Modalities

## 2022-05-02 ENCOUNTER — OFFICE VISIT (OUTPATIENT)
Dept: PHYSICAL THERAPY | Facility: CLINIC | Age: 57
End: 2022-05-02
Payer: COMMERCIAL

## 2022-05-02 DIAGNOSIS — S72.492D OTHER CLOSED FRACTURE OF DISTAL END OF LEFT FEMUR WITH ROUTINE HEALING, SUBSEQUENT ENCOUNTER: Primary | ICD-10-CM

## 2022-05-02 PROCEDURE — 97110 THERAPEUTIC EXERCISES: CPT | Performed by: PHYSICAL THERAPIST

## 2022-05-02 PROCEDURE — 97112 NEUROMUSCULAR REEDUCATION: CPT | Performed by: PHYSICAL THERAPIST

## 2022-05-02 PROCEDURE — 97140 MANUAL THERAPY 1/> REGIONS: CPT | Performed by: PHYSICAL THERAPIST

## 2022-05-02 NOTE — PROGRESS NOTES
Daily Note     Today's date: 2022  Patient name: Ang Michele  : 1965  MRN: [id number]  Referring provider: Boris Stein MD  Dx:   Encounter Diagnosis     ICD-10-CM    1  Other closed fracture of distal end of left femur with routine healing, subsequent encounter  S72 492D        Start Time: 1145  Stop Time: 1230  Total time in clinic (min): 45 minutes    Subjective: Pt reports doing well after her last therapy session with decreased pain and improved motion of her L knee  Objective: See treatment diary below      Assessment: Tolerated treatment well  Patient demonstrated fatigue post treatment, exhibited good technique with therapeutic exercises and would benefit from continued PT  Pt was able to achieve approximately 80 degrees of L knee flexion during today's session  Pt was able to progress today by increasing sets and reps for her supine LE exercises  Pt required min verbal cues to facilitate performance of proper technique  Assess pt response to treatment at next visit  Plan: Continue per plan of care  Precautions: s/p ORIF left distal femur DOS 3/21/22 , Hx L patella replacement 2016  Per ortho visit on 22: "Continue nonweightbearing left lower extremity  Discontinue immobilizer at this time    Cleared for gentle range-of-motion exercises of the left knee"    Access Code: [facility]     Manuals       L knee PROM supine  8' 8' 8' 8' 8' 8'      Seated L knee flexion  5' 5' 5' 5' 5' 5'      Supine knee ext stretch    1x5 10" 10"x5 1x5 10" 1x5 10"      Hamstring stretch    1x5 10" 10"x5 1x5 10" 1x5 10"      Neuro Re-Ed             Quad set with towel under ankle HEP 1x15 5" 1x15 5" 2x10 5"x20 5"x20       Quad set with LAQ HEP 2x10 2x10 2x10 2x15 3x10 3x10      Quad set with SLR  3x5 4x5 5x5 4x6 4x8 3x10      SAQ  2x10 2x10 2x10 2x15        Prone quad set     3"x20 3"x20 3"x20                                Ther Ex             Heel slides HEP 2x10 5" 2x10 " 2x10 5" 2x10 5" 2x10 5" 2x10 5"      Seated knee flexion  HEP 1x10 10" 1x10 10" 1x10 PTA  1x10 10"      Sidelying SLR  2x10 2x10 2x10 2x10 2x10 3x10      Hamstring isometrics  1x15 5" 1x15 5" 1x15 5"   2x10 5"      Prone hip extension   2x10 2x10 2x10 3x10 3x10      Pt education HEP, PT POC 5' 5' 4'                      Ther Activity                                       Gait Training                                       Modalities

## 2022-05-03 ENCOUNTER — OFFICE VISIT (OUTPATIENT)
Dept: OBGYN CLINIC | Facility: CLINIC | Age: 57
End: 2022-05-03

## 2022-05-03 ENCOUNTER — APPOINTMENT (OUTPATIENT)
Dept: RADIOLOGY | Facility: CLINIC | Age: 57
End: 2022-05-03
Payer: COMMERCIAL

## 2022-05-03 VITALS
HEIGHT: 64 IN | DIASTOLIC BLOOD PRESSURE: 93 MMHG | SYSTOLIC BLOOD PRESSURE: 166 MMHG | HEART RATE: 80 BPM | WEIGHT: 143 LBS | BODY MASS INDEX: 24.41 KG/M2

## 2022-05-03 DIAGNOSIS — S72.492A OTHER CLOSED FRACTURE OF DISTAL END OF LEFT FEMUR, INITIAL ENCOUNTER (HCC): Primary | ICD-10-CM

## 2022-05-03 DIAGNOSIS — S72.492A OTHER CLOSED FRACTURE OF DISTAL END OF LEFT FEMUR, INITIAL ENCOUNTER (HCC): ICD-10-CM

## 2022-05-03 DIAGNOSIS — Z87.81 S/P ORIF (OPEN REDUCTION INTERNAL FIXATION) FRACTURE: ICD-10-CM

## 2022-05-03 DIAGNOSIS — Z98.890 S/P ORIF (OPEN REDUCTION INTERNAL FIXATION) FRACTURE: ICD-10-CM

## 2022-05-03 PROCEDURE — 99024 POSTOP FOLLOW-UP VISIT: CPT | Performed by: ORTHOPAEDIC SURGERY

## 2022-05-03 PROCEDURE — 73560 X-RAY EXAM OF KNEE 1 OR 2: CPT

## 2022-05-03 NOTE — PROGRESS NOTES
Orthopaedics Office Visit - Post-op Patient Visit    ASSESSMENT/PLAN:    Assessment:   6 weeks s/p ORIF left distal femur Type B fracture, DOS 3/21/22  Doing well     Plan:   · X-rays were performed in the office and reviewed   · May transition to WBAT   · Continue PT, updated script was provided , continue aggressive knee ROM and strengthening  · Follow up in 6 weeks time for repeat x-rays     To Do Next Visit:  X-ray left knee     _____________________________________________________  CHIEF COMPLAINT:  Chief Complaint   Patient presents with    Left Knee - Post-op         SUBJECTIVE:  Reyes Keating is a 61 y o  female who presents to the office 6 weeks s/p ORIF left distal femur ORIF, DOS 3/21/22  She has been compliant with NWB status  She has been taking Aspirin for DVT prophylaxis  She has been attending PT for knee ROM exercises  She is taking Tylenol and ibuprofen as needed for pain control         SOCIAL HISTORY:  Social History     Tobacco Use    Smoking status: Never Smoker    Smokeless tobacco: Never Used   Vaping Use    Vaping Use: Never used   Substance Use Topics    Alcohol use: Never     Alcohol/week: 0 0 standard drinks    Drug use: Never       MEDICATIONS:    Current Outpatient Medications:     Ascorbic Acid (VITAMIN C ADULT GUMMIES PO), Take 500 mg by mouth daily, Disp: , Rfl:     aspirin (ECOTRIN LOW STRENGTH) 81 mg EC tablet, Take 1 tablet (81 mg total) by mouth every 12 (twelve) hours, Disp: 84 tablet, Rfl: 0    cholecalciferol (VITAMIN D3) 400 units tablet, Take 400 Units by mouth daily, Disp: , Rfl:     Multiple Vitamins-Minerals (MULTIVITAMIN GUMMIES ADULT PO), Take by mouth daily, Disp: , Rfl:     naproxen sodium (ALEVE) 220 MG tablet, Take 220 mg by mouth once as needed , Disp: , Rfl:     oxyCODONE (Roxicodone) 5 immediate release tablet, Take 1 tablet (5 mg total) by mouth every 6 (six) hours as needed for moderate pain Max Daily Amount: 20 mg, Disp: 28 tablet, Rfl: 0   rizatriptan (MAXALT) 5 MG tablet, Take 5 mg by mouth once as needed for migraine May repeat in 2 hours if needed, Disp: , Rfl:     topiramate (TOPAMAX) 25 mg tablet, Take 1 tablet (25 mg total) by mouth 2 (two) times a day, Disp: 60 tablet, Rfl: 4    vitamin E, tocopherol, 1,000 units capsule, Take 1,000 Units by mouth daily, Disp: , Rfl:     oxyCODONE (Roxicodone) 5 immediate release tablet, Take 1 tablet (5 mg total) by mouth every 4 (four) hours as needed for moderate pain Max Daily Amount: 30 mg (Patient not taking: Reported on 5/3/2022 ), Disp: 30 tablet, Rfl: 0    REVIEW OF SYSTEMS:  MSK: as noted in HPI  Neuro: WNL  Pertinent items are otherwise noted in HPI  A comprehensive review of systems was otherwise negative     _____________________________________________________  PHYSICAL EXAMINATION:  Vital signs: /93   Pulse 80   Ht 5' 4" (1 626 m)   Wt 64 9 kg (143 lb)   BMI 24 55 kg/m²   General: No acute distress, awake and alert  Psychiatric: Mood and affect appear appropriate  HEENT: Trachea Midline, No torticollis, no apparent facial trauma  Cardiovascular: No audible murmurs; Extremities appear perfused  Pulmonary: No audible wheezing or stridor  Skin: No open lesions; see further details (if any) below    MUSCULOSKELETAL EXAMINATION:    Extremities:  Left knee   No erythema, ecchymosis or edema  Well healed surgical incision   Passively lacking the last few degrees of extension, (didn't have full extension prior to surgery )  Knee flexion to aprox   80 degrees   Extremity appears warm and well perfused   Ambulates with crutches     _____________________________________________________  STUDIES REVIEWED:  I personally reviewed the images and interpretation is as follows:  X-ray left knee demonstrates orthopedic hardware in good alignment and position     PROCEDURES PERFORMED:  Procedures      Scribe Attestation    I,:  Hannah Nicholas am acting as a scribe while in the presence of the attending physician :       I,:  Jose Jaime MD personally performed the services described in this documentation    as scribed in my presence :

## 2022-05-05 ENCOUNTER — OFFICE VISIT (OUTPATIENT)
Dept: PHYSICAL THERAPY | Facility: CLINIC | Age: 57
End: 2022-05-05
Payer: COMMERCIAL

## 2022-05-05 DIAGNOSIS — S72.492D OTHER CLOSED FRACTURE OF DISTAL END OF LEFT FEMUR WITH ROUTINE HEALING, SUBSEQUENT ENCOUNTER: Primary | ICD-10-CM

## 2022-05-05 PROCEDURE — 97110 THERAPEUTIC EXERCISES: CPT | Performed by: PHYSICAL THERAPIST

## 2022-05-05 PROCEDURE — 97140 MANUAL THERAPY 1/> REGIONS: CPT | Performed by: PHYSICAL THERAPIST

## 2022-05-05 PROCEDURE — 97112 NEUROMUSCULAR REEDUCATION: CPT | Performed by: PHYSICAL THERAPIST

## 2022-05-05 PROCEDURE — 97530 THERAPEUTIC ACTIVITIES: CPT | Performed by: PHYSICAL THERAPIST

## 2022-05-05 NOTE — PROGRESS NOTES
Daily Note     Today's date: 2022  Patient name: Dionicio Dyson  : 1965  MRN: [id number]  Referring provider: Altagracia Young MD  Dx:   Encounter Diagnosis     ICD-10-CM    1  Other closed fracture of distal end of left femur with routine healing, subsequent encounter  S72 492D        Start Time: 1145  Stop Time: 1230  Total time in clinic (min): 45 minutes    Subjective: Pt reports going to see her surgeon on Tuesday who cleared for weightbearing activities as well as being pleased of her current progress  Objective: See treatment diary below      Assessment: Tolerated treatment well  Patient demonstrated fatigue post treatment, exhibited good technique with therapeutic exercises and would benefit from continued PT  Pt was able to progress today with inclusion of ambulation with RW and upright bike into pt's exercise program  Also initiated closed chain strengthening exercises into pt's exercise program, pt able to complete with no issues  Pt required min verbal cues to facilitate performance of proper technique  Assess pt response to treatment at next visit  Plan: Continue per plan of care  Precautions: s/p ORIF left distal femur DOS 3/21/22 , Hx L patella replacement 2016  Per ortho visit on 5/3/22: ROM exercises, may progress to strengthening  WBAT LLE        Access Code: [facility]     Manuals  5     L knee PROM supine  8' 8' 8' 8' 8' 8' 8'     Seated L knee flexion  5' 5' 5' 5' 5' 5' 5'     Supine knee ext stretch    1x5 10" 10"x5 1x5 10" 1x5 10" 1x5 10"     Hamstring stretch    1x5 10" 10"x5 1x5 10" 1x5 10" 1x5 10"     Neuro Re-Ed             Quad set with towel under ankle HEP 1x15 5" 1x15 5" 2x10 5"x20 5"x20       Quad set with LAQ HEP 2x10 2x10 2x10 2x15 3x10 3x10 3x10     Quad set with SLR  3x5 4x5 5x5 4x6 4x8 3x10 3x10     SAQ  2x10 2x10 2x10 2x15        Prone quad set     3"x20 3"x20 3"x20 3"x20                               Ther Ex Heel slides HEP 2x10 5" 2x10 " 2x10 5" 2x10 5" 2x10 5" 2x10 5" 2x10 5"     Seated knee flexion  HEP 1x10 10" 1x10 10" 1x10 PTA  1x10 10"      Sidelying SLR  2x10 2x10 2x10 2x10 2x10 3x10 3x10     Heel raises        2x10     Prone hip extension   2x10 2x10 2x10 3x10 3x10 3x10     Pt education HEP, PT POC 5' 5' 4'         Upright bike        5' half revolutions     Ther Activity             Ambulation in gym c RW        4x 75'     Standing lateral weight shifts        2x10     Mini squats        2x10                  Gait Training                                       Modalities

## 2022-05-09 ENCOUNTER — OFFICE VISIT (OUTPATIENT)
Dept: PHYSICAL THERAPY | Facility: CLINIC | Age: 57
End: 2022-05-09
Payer: COMMERCIAL

## 2022-05-09 DIAGNOSIS — S72.492D OTHER CLOSED FRACTURE OF DISTAL END OF LEFT FEMUR WITH ROUTINE HEALING, SUBSEQUENT ENCOUNTER: Primary | ICD-10-CM

## 2022-05-09 PROCEDURE — 97110 THERAPEUTIC EXERCISES: CPT | Performed by: PHYSICAL THERAPIST

## 2022-05-09 PROCEDURE — 97112 NEUROMUSCULAR REEDUCATION: CPT | Performed by: PHYSICAL THERAPIST

## 2022-05-09 PROCEDURE — 97140 MANUAL THERAPY 1/> REGIONS: CPT | Performed by: PHYSICAL THERAPIST

## 2022-05-09 PROCEDURE — 97530 THERAPEUTIC ACTIVITIES: CPT | Performed by: PHYSICAL THERAPIST

## 2022-05-09 NOTE — PROGRESS NOTES
Daily Note     Today's date: 2022  Patient name: Lory Miller  : 1965  MRN: [id number]  Referring provider: Sandra Mclean MD  Dx:   Encounter Diagnosis     ICD-10-CM    1  Other closed fracture of distal end of left femur with routine healing, subsequent encounter  S72 492D        Start Time: 1415  Stop Time: 1500  Total time in clinic (min): 45 minutes    Subjective: Pt reports doing well after her last therapy session with her usual soreness and stiffness in her LLE  Objective: See treatment diary below      Assessment: Tolerated treatment well  Patient demonstrated fatigue post treatment, exhibited good technique with therapeutic exercises and would benefit from continued PT  Pt was able to progress today with inclusion of step ups as well as increasing distance walked in the gym  Pt was able to achieve 90 degrees of L knee flexion at today's session  Pt required min verbal cues to facilitate performance of proper technique  Assess pt response to treatment at next visit  Plan: Continue per plan of care  Precautions: s/p ORIF left distal femur DOS 3/21/22 , Hx L patella replacement 2016  Per ortho visit on 5/3/22: ROM exercises, may progress to strengthening  WBAT LLE        Access Code: [facility]     Manuals  5 59    L knee PROM supine  8' 8' 8' 8' 8' 8' 8' 7'    Seated L knee flexion  5' 5' 5' 5' 5' 5' 5' 1x10 10"    Supine knee ext stretch    1x5 10" 10"x5 1x5 10" 1x5 10" 1x5 10" 1x5 10"    Hamstring stretch    1x5 10" 10"x5 1x5 10" 1x5 10" 1x5 10" 1x5 10"    Neuro Re-Ed             Quad set with towel under ankle HEP 1x15 5" 1x15 5" 2x10 5"x20 5"x20       Quad set with LAQ HEP 2x10 2x10 2x10 2x15 3x10 3x10 3x10 3x10    Quad set with SLR  3x5 4x5 5x5 4x6 4x8 3x10 3x10 3x10    SAQ  2x10 2x10 2x10 2x15        Prone quad set     3"x20 3"x20 3"x20 3"x20 3"x20                              Ther Ex             Heel slides HEP 2x10 5" 2x10 " 2x10 5" 2x10 5" 2x10 5" 2x10 5" 2x10 5" 2x10 5"    Seated knee flexion  HEP 1x10 10" 1x10 10" 1x10 PTA  1x10 10"      Sidelying SLR  2x10 2x10 2x10 2x10 2x10 3x10 3x10 3x10    Heel raises        2x10 2x10    Prone hip extension   2x10 2x10 2x10 3x10 3x10 3x10 3x10    Pt education HEP, PT POC 5' 5' 4'         Upright bike        5' half revolutions 6' half revolutions    Ther Activity             Ambulation in gym c RW        4x 75' 6x 75'    Standing lateral weight shifts        2x10     Mini squats        2x10 2x10    Step ups         1x10 6"    Gait Training                                       Modalities

## 2022-05-12 ENCOUNTER — OFFICE VISIT (OUTPATIENT)
Dept: PHYSICAL THERAPY | Facility: CLINIC | Age: 57
End: 2022-05-12
Payer: COMMERCIAL

## 2022-05-12 DIAGNOSIS — S72.492D OTHER CLOSED FRACTURE OF DISTAL END OF LEFT FEMUR WITH ROUTINE HEALING, SUBSEQUENT ENCOUNTER: Primary | ICD-10-CM

## 2022-05-12 PROCEDURE — 97110 THERAPEUTIC EXERCISES: CPT | Performed by: PHYSICAL THERAPIST

## 2022-05-12 PROCEDURE — 97530 THERAPEUTIC ACTIVITIES: CPT | Performed by: PHYSICAL THERAPIST

## 2022-05-12 PROCEDURE — 97112 NEUROMUSCULAR REEDUCATION: CPT | Performed by: PHYSICAL THERAPIST

## 2022-05-12 PROCEDURE — 97140 MANUAL THERAPY 1/> REGIONS: CPT | Performed by: PHYSICAL THERAPIST

## 2022-05-12 NOTE — PROGRESS NOTES
Daily Note     Today's date: 2022  Patient name: Vicci Angelucci  : 1965  MRN: [id number]  Referring provider: Marino Hendricks MD  Dx:   Encounter Diagnosis     ICD-10-CM    1  Other closed fracture of distal end of left femur with routine healing, subsequent encounter  S72 492D        Start Time: 1415  Stop Time: 1500  Total time in clinic (min): 45 minutes    Subjective: Pt reports having increased pain on the inside aspect of her L knee for past few days  Pt reports she did well after her last therapy session with no issues  Objective: See treatment diary below      Assessment: Tolerated treatment well  Patient demonstrated fatigue post treatment, exhibited good technique with therapeutic exercises and would benefit from continued PT  Pt was able to progress today by increasing sets and reps for her step ups as well as with inclusion of TKEs into pt's exercise program  Pt continues to demonstrate significant limitations in L knee extension and flexion, however, both improved after manual therapy  Pt required min verbal cues to facilitate performance of proper technique  Assess pt response to treatment at next visit  Plan: Continue per plan of care  Precautions: s/p ORIF left distal femur DOS 3/21/22 , Hx L patella replacement 2016  Per ortho visit on 5/3/22: ROM exercises, may progress to strengthening  WBAT LLE        Access Code: [facility]     Manuals    L knee PROM supine  8' 8' 8' 8' 8' 8' 8' 7' 7'   Seated L knee flexion  5' 5' 5' 5' 5' 5' 5' 1x10 10" 1x10 10"   Supine knee ext stretch    1x5 10" 10"x5 1x5 10" 1x5 10" 1x5 10" 1x5 10" 1x6 10"   Hamstring stretch    1x5 10" 10"x5 1x5 10" 1x5 10" 1x5 10" 1x5 10"    Neuro Re-Ed             Quad set with towel under ankle HEP 1x15 5" 1x15 5" 2x10 5"x20 5"x20       Quad set with LAQ HEP 2x10 2x10 2x10 2x15 3x10 3x10 3x10 3x10 3x10   Quad set with SLR  3x5 4x5 5x5 4x6 4x8 3x10 3x10 3x10 3x10   SAQ  2x10 2x10 2x10 2x15        Prone quad set     3"x20 3"x20 3"x20 3"x20 3"x20 3"x20   TKEs          1x15 5" blue                Ther Ex             Heel slides HEP 2x10 5" 2x10 " 2x10 5" 2x10 5" 2x10 5" 2x10 5" 2x10 5" 2x10 5" 2x10 5"   Seated knee flexion  HEP 1x10 10" 1x10 10" 1x10 PTA  1x10 10"      Sidelying SLR  2x10 2x10 2x10 2x10 2x10 3x10 3x10 3x10 3x10   Heel raises        2x10 2x10 2x10   Prone hip extension   2x10 2x10 2x10 3x10 3x10 3x10 3x10 3x10   Pt education HEP, PT POC 5' 5' 4'         Upright bike        5' half revolutions 6' half revolutions 6' half revolutions   Ther Activity             Ambulation in gym c RW        4x 76' 6x 75' 6x 75'   Standing lateral weight shifts        2x10     Mini squats        2x10 2x10 2x10   Step ups         1x10 6" 2x10 6"   Gait Training                                       Modalities

## 2022-05-16 ENCOUNTER — OFFICE VISIT (OUTPATIENT)
Dept: PHYSICAL THERAPY | Facility: CLINIC | Age: 57
End: 2022-05-16
Payer: COMMERCIAL

## 2022-05-16 DIAGNOSIS — S72.492D OTHER CLOSED FRACTURE OF DISTAL END OF LEFT FEMUR WITH ROUTINE HEALING, SUBSEQUENT ENCOUNTER: Primary | ICD-10-CM

## 2022-05-16 PROCEDURE — 97110 THERAPEUTIC EXERCISES: CPT | Performed by: PHYSICAL THERAPIST

## 2022-05-16 PROCEDURE — 97530 THERAPEUTIC ACTIVITIES: CPT | Performed by: PHYSICAL THERAPIST

## 2022-05-16 PROCEDURE — 97140 MANUAL THERAPY 1/> REGIONS: CPT | Performed by: PHYSICAL THERAPIST

## 2022-05-16 PROCEDURE — 97112 NEUROMUSCULAR REEDUCATION: CPT | Performed by: PHYSICAL THERAPIST

## 2022-05-16 NOTE — PROGRESS NOTES
Daily Note     Today's date: 2022  Patient name: Samina Syed  : 1965  MRN: [id number]  Referring provider: Scott Baig MD  Dx:   Encounter Diagnosis     ICD-10-CM    1  Other closed fracture of distal end of left femur with routine healing, subsequent encounter  S72 492D        Start Time: 1415  Stop Time: 1500  Total time in clinic (min): 45 minutes    Subjective: Pt reports having improved ROM and decreased pain in her L knee after her last therapy session  However, pt reports she continues to have stiffness after certain activities  Objective: See treatment diary below      Assessment: Tolerated treatment well  Patient demonstrated fatigue post treatment, exhibited good technique with therapeutic exercises and would benefit from continued PT  Pt was able to progress today by increasing sets and reps for her mini squats today  Pt continues to have difficulty with L knee flexion, however, pt able to achieve 100 degrees with therapist assist  Pt required min verbal cues to facilitate performance of proper technique  Assess pt response to treatment at next visit  Plan: Continue per plan of care  Precautions: s/p ORIF left distal femur DOS 3/21/22 , Hx L patella replacement 2016  Per ortho visit on 5/3/22: ROM exercises, may progress to strengthening  WBAT LLE        Access Code: [facility]     Manuals    L knee PROM supine 8' 8' 8' 8' 8' 8' 8' 8' 7' 7'   Seated L knee flexion 1x10 10" 5' 5' 5' 5' 5' 5' 5' 1x10 10" 1x10 10"   Supine knee ext stretch 1x8 10"   1x5 10" 10"x5 1x5 10" 1x5 10" 1x5 10" 1x5 10" 1x6 10"   Hamstring stretch    1x5 10" 10"x5 1x5 10" 1x5 10" 1x5 10" 1x5 10"    Neuro Re-Ed             Quad set with towel under ankle  1x15 5" 1x15 5" 2x10 5"x20 5"x20       Quad set with LAQ 3x10 2x10 2x10 2x10 2x15 3x10 3x10 3x10 3x10 3x10   Quad set with SLR 3x10 3x5 4x5 5x5 4x6 4x8 3x10 3x10 3x10 3x10   SAQ  2x10 2x10 2x10 2x15        Prone quad set x20 3"    3"x20 3"x20 3"x20 3"x20 3"x20 3"x20   TKEs          1x15 5" blue                Ther Ex             Heel slides 3x10 5" 2x10 5" 2x10 " 2x10 5" 2x10 5" 2x10 5" 2x10 5" 2x10 5" 2x10 5" 2x10 5"   Seated knee flexion   1x10 10" 1x10 10" 1x10 PTA  1x10 10"      Sidelying SLR  2x10 2x10 2x10 2x10 2x10 3x10 3x10 3x10 3x10   Heel raises 3x10       2x10 2x10 2x10   Prone hip extension   2x10 2x10 2x10 3x10 3x10 3x10 3x10 3x10   Pt education  5' 5' 4'         Upright bike 6' half revolutions       5' half revolutions 6' half revolutions 6' half revolutions   Ther Activity             Ambulation in gym c no AD 5x 100'       4x 75' 6x 75' 6x 75'   Standing lateral weight shifts        2x10     Mini squats 3x10       2x10 2x10 2x10   Step ups 2x10 6"        1x10 6" 2x10 6"   Gait Training                                       Modalities

## 2022-05-19 ENCOUNTER — OFFICE VISIT (OUTPATIENT)
Dept: PHYSICAL THERAPY | Facility: CLINIC | Age: 57
End: 2022-05-19
Payer: COMMERCIAL

## 2022-05-19 DIAGNOSIS — S72.492D OTHER CLOSED FRACTURE OF DISTAL END OF LEFT FEMUR WITH ROUTINE HEALING, SUBSEQUENT ENCOUNTER: Primary | ICD-10-CM

## 2022-05-19 PROCEDURE — 97110 THERAPEUTIC EXERCISES: CPT | Performed by: PHYSICAL THERAPIST

## 2022-05-19 PROCEDURE — 97112 NEUROMUSCULAR REEDUCATION: CPT | Performed by: PHYSICAL THERAPIST

## 2022-05-19 PROCEDURE — 97530 THERAPEUTIC ACTIVITIES: CPT | Performed by: PHYSICAL THERAPIST

## 2022-05-19 PROCEDURE — 97140 MANUAL THERAPY 1/> REGIONS: CPT | Performed by: PHYSICAL THERAPIST

## 2022-05-19 NOTE — PROGRESS NOTES
Daily Note     Today's date: 2022  Patient name: Minnie Mahajan  : 1965  MRN: [id number]  Referring provider: Dennis Botello MD  Dx:   Encounter Diagnosis     ICD-10-CM    1  Other closed fracture of distal end of left femur with routine healing, subsequent encounter  S72 492D        Start Time: 1415  Stop Time: 1516  Total time in clinic (min): 61 minutes    Subjective: Pt reports doing well after her last therapy session, however, she had increased pain when performing her home exercise program yesterday  Objective: See treatment diary below      Assessment: Tolerated treatment well  Patient demonstrated fatigue post treatment, exhibited good technique with therapeutic exercises and would benefit from continued PT  After discussions of benefits and risks, precautions and contraindications, patient elected to participate in personalized blood flow restriction training  Green cuff used  LOP: 204 mmHG, PTP: 102 mmHG  Application and set up by Eve Gonzalez, PT, DPT, OCS who is certified in BFR and treatment provided under his supervision  Pt continues to demonstrate limitations in L knee flexion achieving approximately 95 degrees  Pt required min verbal cues to facilitate performance of proper technique  Assess pt response to treatment at next visit  Plan: Continue per plan of care  Precautions: s/p ORIF left distal femur DOS 3/21/22 , Hx L patella replacement 2016  Per ortho visit on 5/3/22: ROM exercises, may progress to strengthening  WBAT LLE        Access Code: [facility]     Manuals    L knee PROM supine 8' 8' 8' 8' 8' 8' 8' 8' 7' 7'   Seated L knee flexion 1x10 10" 5' 5' 5' 5' 5' 5' 5' 1x10 10" 1x10 10"   Supine knee ext stretch 1x8 10" 1x8 10"  1x5 10" 10"x5 1x5 10" 1x5 10" 1x5 10" 1x5 10" 1x6 10"   Hamstring stretch    1x5 10" 10"x5 1x5 10" 1x5 10" 1x5 10" 1x5 10"    Neuro Re-Ed             Quad set with towel under ankle and BFR BFR 30/15/15/15 1x15 5" 2x10 5"x20 5"x20       Quad set with LAQ 3x10 3x10 2x10 2x10 2x15 3x10 3x10 3x10 3x10 3x10   Quad set with SLR 3x10 3x10 4x5 5x5 4x6 4x8 3x10 3x10 3x10 3x10   SAQ   2x10 2x10 2x15        Prone quad set x20 3"    3"x20 3"x20 3"x20 3"x20 3"x20 3"x20   TKEs          1x15 5" blue                Ther Ex             Heel slides 3x10 5" 2x10 5" 2x10 " 2x10 5" 2x10 5" 2x10 5" 2x10 5" 2x10 5" 2x10 5" 2x10 5"   Seated knee flexion    1x10 10" 1x10 PTA  1x10 10"      Sidelying SLR  3x10 2x10 2x10 2x10 2x10 3x10 3x10 3x10 3x10   Heel raises 3x10 3x10      2x10 2x10 2x10   Prone hip extension   2x10 2x10 2x10 3x10 3x10 3x10 3x10 3x10   Pt education  3' 5' 4'         Upright bike 6' half revolutions 6' half revolutions      5' half revolutions 6' half revolutions 6' half revolutions   Ther Activity             Ambulation in gym c no AD 5x 100' 5x 100'      4x 75' 6x 75' 6x 75'   Standing lateral weight shifts        2x10     Mini squats 3x10 3x10      2x10 2x10 2x10   Step ups 2x10 6" 2x10 6"       1x10 6" 2x10 6"   STS from 20" box  2x10                        Gait Training                                       Modalities

## 2022-05-23 ENCOUNTER — OFFICE VISIT (OUTPATIENT)
Dept: PHYSICAL THERAPY | Facility: CLINIC | Age: 57
End: 2022-05-23
Payer: COMMERCIAL

## 2022-05-23 DIAGNOSIS — S72.492D OTHER CLOSED FRACTURE OF DISTAL END OF LEFT FEMUR WITH ROUTINE HEALING, SUBSEQUENT ENCOUNTER: Primary | ICD-10-CM

## 2022-05-23 PROCEDURE — 97110 THERAPEUTIC EXERCISES: CPT | Performed by: PHYSICAL THERAPIST

## 2022-05-23 PROCEDURE — 97112 NEUROMUSCULAR REEDUCATION: CPT | Performed by: PHYSICAL THERAPIST

## 2022-05-23 PROCEDURE — 97530 THERAPEUTIC ACTIVITIES: CPT | Performed by: PHYSICAL THERAPIST

## 2022-05-23 PROCEDURE — 97140 MANUAL THERAPY 1/> REGIONS: CPT | Performed by: PHYSICAL THERAPIST

## 2022-05-23 NOTE — PROGRESS NOTES
Daily Note     Today's date: 2022  Patient name: Mandeep Shabazz  : 1965  MRN: [id number]  Referring provider: Connie Jon MD  Dx:   Encounter Diagnosis     ICD-10-CM    1  Other closed fracture of distal end of left femur with routine healing, subsequent encounter  S72 492D        Start Time: 1410  Stop Time: 1503  Total time in clinic (min): 53 minutes    Subjective: Pt reports doing ok after her last therapy session, however, she continues to have increased stiffness in her L knee  Objective: See treatment diary below      Assessment: Tolerated treatment well  Patient demonstrated fatigue post treatment, exhibited good technique with therapeutic exercises and would benefit from continued PT  Pt was able to progress today by increasing resistance for her LAQs  Pt continues to improve with her L knee flexion achieving approximately 99 degrees today, however, significant deficits remain  Pt required min verbal cues to facilitate performance of proper technique  Assess pt response to treatment at next visit  Plan: Continue per plan of care  Precautions: s/p ORIF left distal femur DOS 3/21/22 , Hx L patella replacement 2016  Per ortho visit on 5/3/22: ROM exercises, may progress to strengthening  WBAT LLE        Access Code: [facility]     Manuals    L knee PROM supine 8' 8' 8' 8' 8' 8' 8' 8' 7' 7'   Seated L knee flexion 1x10 10" 5' 5' 5' 5' 5' 5' 5' 1x10 10" 1x10 10"   Supine knee ext stretch 1x8 10" 1x8 10" 1x6 10" 1x5 10" 10"x5 1x5 10" 1x5 10" 1x5 10" 1x5 10" 1x6 10"   Hamstring stretch    1x5 10" 10"x5 1x5 10" 1x5 10" 1x5 10" 1x5 10"    Neuro Re-Ed             Quad set with towel under ankle  BFR 15/15/15  2x10 5"x20 5"x20       Quad set with LAQ 3x10 3x10 3x10 3# 2x10 2x15 3x10 3x10 3x10 3x10 3x10   Quad set with SLR 3x10 3x10 3x10 5x5 4x6 4x8 3x10 3x10 3x10 3x10   SAQ    2x10 2x15        Prone quad set x20 3"  10x10"  3"x20 3"x20 3"x20 3"x20 3"x20 3"x20   TKEs          1x15 5" blue                Ther Ex             Heel slides 3x10 5" 2x10 5" 2x10 5" 2x10 5" 2x10 5" 2x10 5" 2x10 5" 2x10 5" 2x10 5" 2x10 5"   Seated knee flexion    1x10 10" 1x10 PTA  1x10 10"      Sidelying SLR  3x10 3x10 2x10 2x10 2x10 3x10 3x10 3x10 3x10   Heel raises 3x10 3x10 3x10     2x10 2x10 2x10   Prone hip extension    2x10 2x10 3x10 3x10 3x10 3x10 3x10   Pt education  3' 3' 4'         Upright bike 6' half revolutions 6' half revolutions 6' half revolutions     5' half revolutions 6' half revolutions 6' half revolutions   Ther Activity             Ambulation in gym c no AD 5x 100' 5x 100'      4x 75' 6x 75' 6x 75'   Standing lateral weight shifts        2x10     Mini squats 3x10 3x10 3x10     2x10 2x10 2x10   Step ups 2x10 6" 2x10 6" 2x10 8"      1x10 6" 2x10 6"   STS from 20" box  2x10 2x10                       Gait Training                                       Modalities

## 2022-05-24 ENCOUNTER — OFFICE VISIT (OUTPATIENT)
Dept: PHYSICAL THERAPY | Facility: CLINIC | Age: 57
End: 2022-05-24
Payer: COMMERCIAL

## 2022-05-24 DIAGNOSIS — S72.492D OTHER CLOSED FRACTURE OF DISTAL END OF LEFT FEMUR WITH ROUTINE HEALING, SUBSEQUENT ENCOUNTER: Primary | ICD-10-CM

## 2022-05-24 PROCEDURE — 97112 NEUROMUSCULAR REEDUCATION: CPT | Performed by: PHYSICAL THERAPIST

## 2022-05-24 PROCEDURE — 97140 MANUAL THERAPY 1/> REGIONS: CPT | Performed by: PHYSICAL THERAPIST

## 2022-05-24 PROCEDURE — 97530 THERAPEUTIC ACTIVITIES: CPT | Performed by: PHYSICAL THERAPIST

## 2022-05-24 PROCEDURE — 97110 THERAPEUTIC EXERCISES: CPT | Performed by: PHYSICAL THERAPIST

## 2022-05-24 NOTE — PROGRESS NOTES
Daily Note     Today's date: 2022  Patient name: Dionicio Dyson  : 1965  MRN: [id number]  Referring provider: Altagracia Young MD  Dx:   Encounter Diagnosis     ICD-10-CM    1  Other closed fracture of distal end of left femur with routine healing, subsequent encounter  S72 492D        Start Time: 0810  Stop Time: 0850  Total time in clinic (min): 40 minutes    Subjective: Pt reports having stiffness in her L knee after yesterday's session, however, she continues to feel better  Objective: See treatment diary below      Assessment: Tolerated treatment well  Patient demonstrated fatigue post treatment, exhibited good technique with therapeutic exercises and would benefit from continued PT  Pt was able to progress today by increasing sets and reps for her SLR exercise  Pt continues to demonstrate improved tolerance to all functional activities  Pt required min verbal cues to facilitate performance of proper technique  Assess pt response to treatment at next visit  Plan: Continue per plan of care  Precautions: s/p ORIF left distal femur DOS 3/21/22 , Hx L patella replacement 2016  Per ortho visit on 5/3/22: ROM exercises, may progress to strengthening  WBAT LLE        Access Code: [facility]     Manuals    L knee PROM supine 8' 8' 8' 8' 8' 8' 8' 8' 7' 7'   Seated L knee flexion 1x10 10" 5' 5' 5' 5' 5' 5' 5' 1x10 10" 1x10 10"   Supine knee ext stretch 1x8 10" 1x8 10" 1x6 10" 1x6 10" 10"x5 1x5 10" 1x5 10" 1x5 10" 1x5 10" 1x6 10"   Hamstring stretch     10"x5 1x5 10" 1x5 10" 1x5 10" 1x5 10"    Neuro Re-Ed             Quad set with towel under ankle  BFR 15/15/15   5"x20 5"x20       Quad set with LAQ 3x10 3x10 3x10 3# 3x10 3# 2x15 3x10 3x10 3x10 3x10 3x10   Quad set with SLR 3x10 3x10 3x10 3x15 4x6 4x8 3x10 3x10 3x10 3x10   SAQ     2x15        Prone quad set x20 3"  10x10"  3"x20 3"x20 3"x20 3"x20 3"x20 3"x20   TKEs          1x15 5" blue Ther Ex             Heel slides 3x10 5" 2x10 5" 2x10 5" 2x10 5" 2x10 5" 2x10 5" 2x10 5" 2x10 5" 2x10 5" 2x10 5"   Seated knee flexion    1x10 10" 1x10 PTA  1x10 10"      Sidelying SLR  3x10 3x10  2x10 2x10 3x10 3x10 3x10 3x10   Heel raises 3x10 3x10 3x10 3x10    2x10 2x10 2x10   Prone quad stretch    1x10 10"         Pt education  3' 3' 4'         Upright bike 6' half revolutions 6' half revolutions 6' half revolutions 6' half revolutions    5' half revolutions 6' half revolutions 6' half revolutions   Ther Activity             Ambulation in gym c no AD 5x 100' 5x 100'      4x 75' 6x 75' 6x 75'   Standing lateral weight shifts        2x10     Mini squats 3x10 3x10 3x10 3x10    2x10 2x10 2x10   Step ups 2x10 6" 2x10 6" 2x10 8" 2x10 8"     1x10 6" 2x10 6"   STS from 20" box  2x10 2x10 2x10                      Gait Training                                       Modalities

## 2022-05-26 ENCOUNTER — APPOINTMENT (OUTPATIENT)
Dept: PHYSICAL THERAPY | Facility: CLINIC | Age: 57
End: 2022-05-26
Payer: COMMERCIAL

## 2022-06-02 ENCOUNTER — OFFICE VISIT (OUTPATIENT)
Dept: PHYSICAL THERAPY | Facility: CLINIC | Age: 57
End: 2022-06-02
Payer: COMMERCIAL

## 2022-06-02 DIAGNOSIS — S72.492D OTHER CLOSED FRACTURE OF DISTAL END OF LEFT FEMUR WITH ROUTINE HEALING, SUBSEQUENT ENCOUNTER: Primary | ICD-10-CM

## 2022-06-02 PROCEDURE — 97140 MANUAL THERAPY 1/> REGIONS: CPT | Performed by: PHYSICAL THERAPIST

## 2022-06-02 PROCEDURE — 97110 THERAPEUTIC EXERCISES: CPT | Performed by: PHYSICAL THERAPIST

## 2022-06-02 PROCEDURE — 97112 NEUROMUSCULAR REEDUCATION: CPT | Performed by: PHYSICAL THERAPIST

## 2022-06-02 PROCEDURE — 97530 THERAPEUTIC ACTIVITIES: CPT | Performed by: PHYSICAL THERAPIST

## 2022-06-02 NOTE — PROGRESS NOTES
Daily Note     Today's date: 2022  Patient name: Mary Wolf  : 1965  MRN: [id number]  Referring provider: Benny Sibley MD  Dx:   Encounter Diagnosis     ICD-10-CM    1  Other closed fracture of distal end of left femur with routine healing, subsequent encounter  S72 492D        Start Time: 1425  Stop Time: 1510  Total time in clinic (min): 45 minutes    Subjective: Pt reports she continues to have difficulty with bending of her L knee, however, she has been walking without a walker at home  Objective: See treatment diary below      Assessment: Tolerated treatment well  Patient demonstrated fatigue post treatment, exhibited good technique with therapeutic exercises and would benefit from continued PT  Pt was able to progress today with inclusion of wall sits into pt's exercise program  Pt continues to demonstrate difficulty with L knee flexion, achieving approximately 97 degrees today  Plan: Continue per plan of care  Precautions: s/p ORIF left distal femur DOS 3/21/22 , Hx L patella replacement 2016  Per ortho visit on 5/3/22: ROM exercises, may progress to strengthening  WBAT LLE        Access Code: [facility]     Manuals    L knee PROM supine 8' 8' 8' 8' 8' 8' 8' 8' 7' 7'   Seated L knee flexion 1x10 10" 5' 5' 5' 5' 5' 5' 5' 1x10 10" 1x10 10"   Supine knee ext stretch 1x8 10" 1x8 10" 1x6 10" 1x6 10" 10"x5 1x5 10" 1x5 10" 1x5 10" 1x5 10" 1x6 10"   Hamstring stretch      1x5 10" 1x5 10" 1x5 10" 1x5 10"    Neuro Re-Ed             Quad set with towel under ankle  BFR 30/15/15/15    5"x20       Quad set with LAQ 3x10 3x10 3x10 3# 3x10 3# 3x10 3# 3x10 3x10 3x10 3x10 3x10   Quad set with SLR 3x10 3x10 3x10 3x15 3x15 4x8 3x10 3x10 3x10 3x10   SAQ             Prone quad set x20 3"  10x10"   3"x20 3"x20 3"x20 3"x20 3"x20   TKEs          1x15 5" blue                Ther Ex             Heel slides 3x10 5" 2x10 5" 2x10 5" 2x10 5" 2x10 5" 2x10 5" 2x10 5" 2x10 5" 2x10 5" 2x10 5"   Seated knee flexion    1x10 10" 1x10   1x10 10"      Sidelying SLR  3x10 3x10   2x10 3x10 3x10 3x10 3x10   Heel raises 3x10 3x10 3x10 3x10 3x10   2x10 2x10 2x10   Prone quad stretch    1x10 10" 1x10 10"        Pt education  3' 3' 4'         Upright bike 6' half revolutions 6' half revolutions 6' half revolutions 6' half revolutions 5' full revolutions   5' half revolutions 6' half revolutions 6' half revolutions   Wall sits     1x10 3"                     Ther Activity             Ambulation in gym c no AD 5x 100' 5x 100'      4x 75' 6x 75' 6x 75'   Standing lateral weight shifts        2x10     Mini squats 3x10 3x10 3x10 3x10 3x10   2x10 2x10 2x10   Step ups 2x10 6" 2x10 6" 2x10 8" 2x10 8" 2x10 8"    1x10 6" 2x10 6"   STS from 20" box  2x10 2x10 2x10 2x10                     Gait Training                                       Modalities

## 2022-06-03 ENCOUNTER — OFFICE VISIT (OUTPATIENT)
Dept: PHYSICAL THERAPY | Facility: CLINIC | Age: 57
End: 2022-06-03
Payer: COMMERCIAL

## 2022-06-03 DIAGNOSIS — S72.492D OTHER CLOSED FRACTURE OF DISTAL END OF LEFT FEMUR WITH ROUTINE HEALING, SUBSEQUENT ENCOUNTER: Primary | ICD-10-CM

## 2022-06-03 PROCEDURE — 97530 THERAPEUTIC ACTIVITIES: CPT | Performed by: PHYSICAL THERAPIST

## 2022-06-03 PROCEDURE — 97140 MANUAL THERAPY 1/> REGIONS: CPT | Performed by: PHYSICAL THERAPIST

## 2022-06-03 PROCEDURE — 97112 NEUROMUSCULAR REEDUCATION: CPT | Performed by: PHYSICAL THERAPIST

## 2022-06-03 PROCEDURE — 97110 THERAPEUTIC EXERCISES: CPT | Performed by: PHYSICAL THERAPIST

## 2022-06-03 NOTE — PROGRESS NOTES
Daily Note     Today's date: 6/3/2022  Patient name: Any Perea  : 1965  MRN: [id number]  Referring provider: Vivian Rdz MD  Dx:   Encounter Diagnosis     ICD-10-CM    1  Other closed fracture of distal end of left femur with routine healing, subsequent encounter  S72 492D        Start Time: 1014  Stop Time: 1100  Total time in clinic (min): 46 minutes    Subjective: Pt reports doing well after her last therapy session with no pain of her L knee  Objective: See treatment diary below      Assessment: Tolerated treatment well  Patient demonstrated fatigue post treatment, exhibited good technique with therapeutic exercises and would benefit from continued PT  Pt was able to demonstrate improved L knee flexion at today's session, achieving approximately 100 degrees of L knee flexion  Pt continues to be challenged by her current exercise program but able to complete all exercises with no pain  Pt required min verbal cues to facilitate performance of proper technique  Assess pt response to treatment at next visit  Plan: Continue per plan of care  Precautions: s/p ORIF left distal femur DOS 3/21/22 , Hx L patella replacement 2016  Per ortho visit on 5/3/22: ROM exercises, may progress to strengthening  WBAT LLE        Access Code: [facility]     Manuals 5/16 5/19 5/23 5/24 6/2 6/3 5/2 5/5 5/9 5/12   L knee PROM supine 8' 8' 8' 8' 8' 8' 8' 8' 7' 7'   Seated L knee flexion 1x10 10" 5' 5' 5' 5' 5' 5' 5' 1x10 10" 1x10 10"   Supine knee ext stretch 1x8 10" 1x8 10" 1x6 10" 1x6 10" 10"x5 1x5 10" 1x5 10" 1x5 10" 1x5 10" 1x6 10"   Hamstring stretch       1x5 10" 1x5 10" 1x5 10"    Neuro Re-Ed             Quad set with towel under ankle  BFR /15/15/15           Quad set with LAQ 3x10 3x10 3x10 3# 3x10 3# 3x10 3# 3x10 3# 3x10 3x10 3x10 3x10   Quad set with SLR 3x10 3x10 3x10 3x15 3x15 3x15 3x10 3x10 3x10 3x10   SAQ             Prone quad set x20 3"  10x10"    3"x20 3"x20 3"x20 3"x20   TKEs 1x15 5" blue                Ther Ex             Heel slides 3x10 5" 2x10 5" 2x10 5" 2x10 5" 2x10 5" 2x10 5" 2x10 5" 2x10 5" 2x10 5" 2x10 5"   Seated knee flexion    1x10 10" 1x10   1x10 10"      Sidelying SLR  3x10 3x10    3x10 3x10 3x10 3x10   Heel raises 3x10 3x10 3x10 3x10 3x10 3x10  2x10 2x10 2x10   Prone quad stretch    1x10 10" 1x10 10" 1x10 10"       Pt education  3' 3' 4'         Upright bike 6' half revolutions 6' half revolutions 6' half revolutions 6' half revolutions 5' full revolutions 6' half revolutions  5' half revolutions 6' half revolutions 6' half revolutions   Wall sits     1x10 3" 1x10 3"                    Ther Activity             Ambulation in gym c no AD 5x 100' 5x 100'      4x 75' 6x 75' 6x 75'   Standing lateral weight shifts        2x10     Mini squats 3x10 3x10 3x10 3x10 3x10 3x10  2x10 2x10 2x10   Step ups 2x10 6" 2x10 6" 2x10 8" 2x10 8" 2x10 8" 2x10 8"   1x10 6" 2x10 6"   STS from 20" box  2x10 2x10 2x10 2x10 2x10                    Gait Training                                       Modalities

## 2022-06-06 ENCOUNTER — OFFICE VISIT (OUTPATIENT)
Dept: PHYSICAL THERAPY | Facility: CLINIC | Age: 57
End: 2022-06-06
Payer: COMMERCIAL

## 2022-06-06 DIAGNOSIS — S72.492D OTHER CLOSED FRACTURE OF DISTAL END OF LEFT FEMUR WITH ROUTINE HEALING, SUBSEQUENT ENCOUNTER: Primary | ICD-10-CM

## 2022-06-06 PROCEDURE — 97530 THERAPEUTIC ACTIVITIES: CPT | Performed by: PHYSICAL THERAPIST

## 2022-06-06 PROCEDURE — 97112 NEUROMUSCULAR REEDUCATION: CPT | Performed by: PHYSICAL THERAPIST

## 2022-06-06 PROCEDURE — 97110 THERAPEUTIC EXERCISES: CPT | Performed by: PHYSICAL THERAPIST

## 2022-06-06 PROCEDURE — 97140 MANUAL THERAPY 1/> REGIONS: CPT | Performed by: PHYSICAL THERAPIST

## 2022-06-06 NOTE — PROGRESS NOTES
Daily Note     Today's date: 2022  Patient name: Linda Elizondo  : 1965  MRN: [id number]  Referring provider: Kiera Finch MD  Dx:   Encounter Diagnosis     ICD-10-CM    1  Other closed fracture of distal end of left femur with routine healing, subsequent encounter  S72 492D        Start Time: 1500  Stop Time: 1545  Total time in clinic (min): 45 minutes    Subjective: Pt reports doing well after last therapy session, however, she had significant stiffness in her L knee this morning  Pt reports the stiffness has improved but still limiting at the start of therapy today  Objective: See treatment diary below      Assessment: Tolerated treatment well  Patient demonstrated fatigue post treatment, exhibited good technique with therapeutic exercises and would benefit from continued PT  Pt was able to achieve full revolutions on the recumbent bike at the end of today's session  Pt continues to demonstrate a quad lag during performance of her SLR exercise  Pt required min verbal cues to facilitate performance of proper technique  Assess pt response to treatment at next visit  Plan: Continue per plan of care  Precautions: s/p ORIF left distal femur DOS 3/21/22 , Hx L patella replacement 2016  Per ortho visit on 5/3/22: ROM exercises, may progress to strengthening  WBAT LLE        Access Code: [facility]     Manuals 5/16 5/19 5/23 5/24 6/2 6/3 6/6 5/5 5/9 5/12   L knee PROM supine 8' 8' 8' 8' 8' 8' 8' 8' 7' 7'   Seated L knee flexion 1x10 10" 5' 5' 5' 5' 5' 5' 5' 1x10 10" 1x10 10"   Supine knee ext stretch 1x8 10" 1x8 10" 1x6 10" 1x6 10" 10"x5 1x5 10" 1x5 10" 1x5 10" 1x5 10" 1x6 10"   Hamstring stretch        1x5 10" 1x5 10"    Neuro Re-Ed             Quad set with towel under ankle  BFR 30/15/15/15           Quad set with LAQ 3x10 3x10 3x10 3# 3x10 3# 3x10 3# 3x10 3# 3x10 4# 3x10 3x10 3x10   Quad set with SLR 3x10 3x10 3x10 3x15 3x15 3x15 3x15 3x10 3x10 3x10                Prone quad set x20 3"  10x10"     3"x20 3"x20 3"x20   TKEs          1x15 5" blue                Ther Ex             Heel slides 3x10 5" 2x10 5" 2x10 5" 2x10 5" 2x10 5" 2x10 5" 2x10 5" 2x10 5" 2x10 5" 2x10 5"   Seated knee flexion    1x10 10" 1x10   1x10 10"      Sidelying SLR  3x10 3x10     3x10 3x10 3x10   Heel raises 3x10 3x10 3x10 3x10 3x10 3x10 3x10 2x10 2x10 2x10   Prone quad stretch    1x10 10" 1x10 10" 1x10 10" 1x10 10"      Pt education  3' 3' 4'         Upright bike 6' half revolutions 6' half revolutions 6' half revolutions 6' half revolutions 5' full revolutions 6' half revolutions 5' half revolutions, 4' full revolutions 5' half revolutions 6' half revolutions 6' half revolutions   Wall sits     1x10 3" 1x10 3" 1x10 3"                   Ther Activity             Ambulation in gym c no AD 5x 100' 5x 100'      4x 75' 6x 75' 6x 75'   Standing lateral weight shifts        2x10     Mini squats 3x10 3x10 3x10 3x10 3x10 3x10 3x10 2x10 2x10 2x10   Step ups 2x10 6" 2x10 6" 2x10 8" 2x10 8" 2x10 8" 2x10 8" 2x10 8"  1x10 6" 2x10 6"   STS from 20" box  2x10 2x10 2x10 2x10 2x10 3x10                   Gait Training                                       Modalities

## 2022-06-09 ENCOUNTER — EVALUATION (OUTPATIENT)
Dept: PHYSICAL THERAPY | Facility: CLINIC | Age: 57
End: 2022-06-09
Payer: COMMERCIAL

## 2022-06-09 DIAGNOSIS — S72.492D OTHER CLOSED FRACTURE OF DISTAL END OF LEFT FEMUR WITH ROUTINE HEALING, SUBSEQUENT ENCOUNTER: Primary | ICD-10-CM

## 2022-06-09 PROCEDURE — 97530 THERAPEUTIC ACTIVITIES: CPT | Performed by: PHYSICAL THERAPIST

## 2022-06-09 PROCEDURE — 97112 NEUROMUSCULAR REEDUCATION: CPT | Performed by: PHYSICAL THERAPIST

## 2022-06-09 PROCEDURE — 97140 MANUAL THERAPY 1/> REGIONS: CPT | Performed by: PHYSICAL THERAPIST

## 2022-06-09 PROCEDURE — 97110 THERAPEUTIC EXERCISES: CPT | Performed by: PHYSICAL THERAPIST

## 2022-06-09 NOTE — PROGRESS NOTES
PT Evaluation     Today's date: 2022  Patient name: Terrence Stuart  : 1965  MRN: [id number]  Referring provider: Minoo Hendrickson MD  Dx:   Encounter Diagnosis     ICD-10-CM    1  Other closed fracture of distal end of left femur with routine healing, subsequent encounter  S72 492D        Start Time: 1545  Stop Time: 1635  Total time in clinic (min): 50 minutes    Assessment  Assessment details: Upon re-examination, pt has improved strength and ROM of her L knee as well as decreased pain with functional activities  Pt's FOTO score has improved, demonstrating an improved ability to perform functional activities  Pt is also now able to ambulate with no AD and ascend/descend stairs with a step over step pattern  However, pt continues to have deficits of strength and ROM of her LLE as well as having increased pain and difficulty performing functional activities  Pt plan of care will focus on the previously mentioned deficits and limitations  Pt would continue to benefit from skilled physical therapy to facilitate a safe return to her prior level of function  Impairments: abnormal gait, abnormal or restricted ROM, activity intolerance, impaired balance, impaired physical strength, lacks appropriate home exercise program, pain with function and weight-bearing intolerance    Symptom irritability: moderateUnderstanding of Dx/Px/POC: excellent   Prognosis: good    Goals  STG to be achieved in 4 weeks: The patient will report a decrease in left knee "at worst" subjective pain rating score to at least 5/10 to allow for improved tolerance for weightbearing activities  Ongoing   The patient will increase left knee flexion AROM to at least 90 degrees to allow for improved functional mobility  Ongoing  The patient will increase left knee extension MMT score to at least 4/5 to allow for improved functional mobility  Ongoing    LTG to be achieved by DC: The patient will be independent in comprehensive HEP  The patient will report no pain with usual activities  Ongoing  The patient will improve left knee AROM to [facility] to allow for improved functional mobility  Ongoing  The patient will increase left knee MMT score to [facility] to allow for improved functional mobility  Ongoing  The patient will be able to negotiate the stairs in her home in a reciprocal gait pattern without difficulty  Ongoing  The patient will be able to ambulate one mile without difficulty  Ongoing      Plan  Patient would benefit from: skilled physical therapy  Planned modality interventions: thermotherapy: hydrocollator packs, TENS and cryotherapy  Planned therapy interventions: manual therapy, joint mobilization, balance/weight bearing training, neuromuscular re-education, patient education, flexibility, strengthening, stretching, therapeutic activities, therapeutic exercise, gait training and home exercise program  Frequency: 2x week  Duration in weeks: 8  Plan of Care beginning date: 2022  Plan of Care expiration date: 2022  Treatment plan discussed with: patient        Subjective Evaluation    History of Present Illness  Mechanism of injury: Bruno Zafar presents to therapy s/p ORIF left distal femur, DOS 3/21/22  Pt reports she is now able to walk with no AD, however, she does still have stiffness when she is walking and going up the stairs  Pt reports having a significant improvement in strength and ROM of her LLE, however, she does still have difficulty with bending and straightening her L knee  Pt reports she is now able perform stair with a step over step pattern, however, she does still have pain during performance  Pt reports she is also having getting into and out of the shower   Pt reports feeling 70% of her prior level of function before the accident  Pain  Current pain ratin  At best pain ratin  At worst pain ratin  Location: L knee   Quality: sharp and discomfort  Relieving factors: medications, ice and change in position  Aggravating factors: standing, sitting, walking and stair climbing    Social Support  Steps to enter house: yes  Stairs in house: yes   Lives in: multiple-level home  Lives with: adult children    Employment status: not working  Treatments  Previous treatment: immobilization and medication  Current treatment: physical therapy  Patient Goals  Patient goals for therapy: decreased pain, increased motion, improved balance, independence with ADLs/IADLs and increased strength  Patient goal: start walking again         Objective     Observations     Additional Observation Details  (+) incision on medial aspect of L knee  Approximately 13 cm in length  Well approximated, no signs of infection    Active Range of Motion   Left Hip   Flexion: 105 degrees   Abduction: 30 degrees     Right Hip   Flexion: 110 degrees   Abduction: 30 degrees   Left Knee   Flexion: 100 degrees with pain  Extension: 0 degrees with pain    Right Knee   Flexion: WFL  Extension: WFL    Passive Range of Motion   Left Knee   Flexion: 102 degrees with pain    Strength/Myotome Testing     Left Hip   Planes of Motion   Flexion: 4-  Abduction: 4-    Right Hip   Planes of Motion   Flexion: 5  Abduction: 5    Left Knee   Flexion: 3+  Extension: 4-    Right Knee   Flexion: 5  Extension: 5    Left Ankle/Foot   Dorsiflexion: 4+  Plantar flexion: 4+    Right Ankle/Foot   Dorsiflexion: 5  Plantar flexion: 5    Ambulation   Weight-Bearing Status   Weight-Bearing Status (Left): non-weight-bearing   Assistive device used: crutches             Precautions: s/p ORIF left distal femur DOS 3/21/22 , Hx L patella replacement 2016  Per ortho visit on 5/3/22: ROM exercises, may progress to strengthening  WBAT LLE        Access Code: [facility]     Manuals 5/16 5/19 5/23 5/24 6/2 6/3 6/6 6/9 5/9 5/12   L knee PROM supine 8' 8' 8' 8' 8' 8' 8' 8' 7' 7'   Seated L knee flexion 1x10 10" 5' 5' 5' 5' 5' 5' 5' 1x10 10" 1x10 10"   Supine knee ext stretch 1x8 10" 1x8 10" 1x6 10" 1x6 10" 10"x5 1x5 10" 1x5 10" 1x5 10" 1x5 10" 1x6 10"   Hamstring stretch         1x5 10"    Neuro Re-Ed             Quad set with towel under ankle  BFR 30/15/15/15           Quad set with LAQ 3x10 3x10 3x10 3# 3x10 3# 3x10 3# 3x10 3# 3x10 4# 3x10 4# 3x10 3x10   Quad set with SLR 3x10 3x10 3x10 3x15 3x15 3x15 3x15 3x15 3x10 3x10                Prone quad set x20 3"  10x10"      3"x20 3"x20   TKEs          1x15 5" blue                Ther Ex             Heel slides 3x10 5" 2x10 5" 2x10 5" 2x10 5" 2x10 5" 2x10 5" 2x10 5" 2x10 5" 2x10 5" 2x10 5"   Seated knee flexion    1x10 10" 1x10   1x10 10"      Sidelying SLR  3x10 3x10      3x10 3x10   Heel raises 3x10 3x10 3x10 3x10 3x10 3x10 3x10  2x10 2x10   Prone quad stretch    1x10 10" 1x10 10" 1x10 10" 1x10 10" 1x10 10"     Pt education  3' 3' 4'         Upright bike 6' half revolutions 6' half revolutions 6' half revolutions 6' half revolutions 5' full revolutions 6' half revolutions 5' half revolutions, 4' full revolutions 6' 6' half revolutions 6' half revolutions   Wall sits     1x10 3" 1x10 3" 1x10 3" 2x10 3"                  Ther Activity             Side step ups        2x10 8"     Standing lateral weight shifts             Mini squats 3x10 3x10 3x10 3x10 3x10 3x10 3x10 3x10 2x10 2x10   Step ups 2x10 6" 2x10 6" 2x10 8" 2x10 8" 2x10 8" 2x10 8" 2x10 8" 2x10 8" 1x10 6" 2x10 6"   STS from 20" box  2x10 2x10 2x10 2x10 2x10 3x10 3x10 10#                  Gait Training                                       Modalities

## 2022-06-12 DIAGNOSIS — Z98.890 S/P ORIF (OPEN REDUCTION INTERNAL FIXATION) FRACTURE: Primary | ICD-10-CM

## 2022-06-12 DIAGNOSIS — S72.492A OTHER CLOSED FRACTURE OF DISTAL END OF LEFT FEMUR, INITIAL ENCOUNTER (HCC): ICD-10-CM

## 2022-06-12 DIAGNOSIS — Z87.81 S/P ORIF (OPEN REDUCTION INTERNAL FIXATION) FRACTURE: Primary | ICD-10-CM

## 2022-06-13 ENCOUNTER — OFFICE VISIT (OUTPATIENT)
Dept: PHYSICAL THERAPY | Facility: CLINIC | Age: 57
End: 2022-06-13
Payer: COMMERCIAL

## 2022-06-13 DIAGNOSIS — S72.492D OTHER CLOSED FRACTURE OF DISTAL END OF LEFT FEMUR WITH ROUTINE HEALING, SUBSEQUENT ENCOUNTER: Primary | ICD-10-CM

## 2022-06-13 PROCEDURE — 97530 THERAPEUTIC ACTIVITIES: CPT | Performed by: PHYSICAL THERAPIST

## 2022-06-13 PROCEDURE — 97140 MANUAL THERAPY 1/> REGIONS: CPT | Performed by: PHYSICAL THERAPIST

## 2022-06-13 PROCEDURE — 97112 NEUROMUSCULAR REEDUCATION: CPT | Performed by: PHYSICAL THERAPIST

## 2022-06-13 PROCEDURE — 97110 THERAPEUTIC EXERCISES: CPT | Performed by: PHYSICAL THERAPIST

## 2022-06-13 NOTE — PROGRESS NOTES
Daily Note     Today's date: 2022  Patient name: Cori Luke  : 1965  MRN: [id number]  Referring provider: Josie Snow MD  Dx:   Encounter Diagnosis     ICD-10-CM    1  Other closed fracture of distal end of left femur with routine healing, subsequent encounter  S72 492D        Start Time: 1415  Stop Time: 1500  Total time in clinic (min): 45 minutes    Subjective: Pt reports having increased stiffness in her L knee after her last therapy session  She continues to have difficulty getting into and out of the shower  Objective: See treatment diary below      Assessment: Tolerated treatment well  Patient demonstrated fatigue post treatment, exhibited good technique with therapeutic exercises and would benefit from continued PT  Pt was able to progress today by increasing resistance for her SLR exercise  However, pt had a very difficult time performing step downs secondary to pt hesitancy and pain  Pt required min verbal cues to facilitate performance of proper technique  Assess pt response to treatment at next visit  Plan: Continue per plan of care  Precautions: s/p ORIF left distal femur DOS 3/21/22 , Hx L patella replacement 2016  Per ortho visit on 5/3/22: ROM exercises, may progress to strengthening  WBAT LLE        Access Code: [facility]     Manuals 5/16 5/19 5/23 5/24 6/2 6/3 6/6 6/9 6/13 5/12   L knee PROM supine 8' 8' 8' 8' 8' 8' 8' 8' 7' 7'   Seated L knee flexion 1x10 10" 5' 5' 5' 5' 5' 5' 5' 5' 1x10 10"   Supine knee ext stretch 1x8 10" 1x8 10" 1x6 10" 1x6 10" 10"x5 1x5 10" 1x5 10" 1x5 10" 1x5 10" 1x6 10"   Hamstring stretch             Neuro Re-Ed             Quad set with towel under ankle  BFR /15/15/15           Quad set with LAQ 3x10 3x10 3x10 3# 3x10 3# 3x10 3# 3x10 3# 3x10 4# 3x10 4# 3x10 4# 3x10   Quad set with SLR 3x10 3x10 3x10 3x15 3x15 3x15 3x15 3x15 3x10 3# 3x10                Prone quad set x20 3"  10x10"          TKEs                          Ther Ex Heel slides 3x10 5" 2x10 5" 2x10 5" 2x10 5" 2x10 5" 2x10 5" 2x10 5" 2x10 5" 2x10 5" 2x10 5"   Seated knee flexion    1x10 10" 1x10   1x10 10"      Sidelying SLR  3x10 3x10       3x10   SL heel raises 3x10 3x10 3x10 3x10 3x10 3x10 3x10  2x10 2x10   Prone quad stretch    1x10 10" 1x10 10" 1x10 10" 1x10 10" 1x10 10" 1x10 10"    Pt education  3' 3' 4'         Upright bike 6' half revolutions 6' half revolutions 6' half revolutions 6' half revolutions 5' full revolutions 6' half revolutions 5' half revolutions, 4' full revolutions 6' 6'  6' half revolutions   Wall sits     1x10 3" 1x10 3" 1x10 3" 2x10 3" 2x10 3"                 Ther Activity             Side step ups        2x10 8" 2x10 8"    Step downs         1x5 4"    Mini squats 3x10 3x10 3x10 3x10 3x10 3x10 3x10 3x10 3x10 2x10   Step ups 2x10 6" 2x10 6" 2x10 8" 2x10 8" 2x10 8" 2x10 8" 2x10 8" 2x10 8" 2x10 8" 2x10 6"   STS from 20" box  2x10 2x10 2x10 2x10 2x10 3x10 3x10 10# 3x10 10#                 Gait Training                                       Modalities

## 2022-06-14 ENCOUNTER — APPOINTMENT (OUTPATIENT)
Dept: RADIOLOGY | Facility: CLINIC | Age: 57
End: 2022-06-14
Payer: COMMERCIAL

## 2022-06-14 ENCOUNTER — OFFICE VISIT (OUTPATIENT)
Dept: OBGYN CLINIC | Facility: CLINIC | Age: 57
End: 2022-06-14

## 2022-06-14 VITALS
HEIGHT: 64 IN | SYSTOLIC BLOOD PRESSURE: 163 MMHG | BODY MASS INDEX: 23.87 KG/M2 | HEART RATE: 82 BPM | DIASTOLIC BLOOD PRESSURE: 91 MMHG | WEIGHT: 139.8 LBS

## 2022-06-14 DIAGNOSIS — Z98.890 S/P ORIF (OPEN REDUCTION INTERNAL FIXATION) FRACTURE: ICD-10-CM

## 2022-06-14 DIAGNOSIS — Z87.81 S/P ORIF (OPEN REDUCTION INTERNAL FIXATION) FRACTURE: ICD-10-CM

## 2022-06-14 DIAGNOSIS — M25.561 CHRONIC PAIN OF RIGHT KNEE: ICD-10-CM

## 2022-06-14 DIAGNOSIS — S72.492A OTHER CLOSED FRACTURE OF DISTAL END OF LEFT FEMUR, INITIAL ENCOUNTER (HCC): Primary | ICD-10-CM

## 2022-06-14 DIAGNOSIS — G89.29 CHRONIC PAIN OF RIGHT KNEE: ICD-10-CM

## 2022-06-14 PROCEDURE — 73560 X-RAY EXAM OF KNEE 1 OR 2: CPT

## 2022-06-14 PROCEDURE — 99024 POSTOP FOLLOW-UP VISIT: CPT | Performed by: ORTHOPAEDIC SURGERY

## 2022-06-14 RX ORDER — GABAPENTIN 300 MG/1
300 CAPSULE ORAL
Qty: 30 CAPSULE | Refills: 1 | Status: SHIPPED | OUTPATIENT
Start: 2022-06-14 | End: 2022-08-09 | Stop reason: DRUGHIGH

## 2022-06-14 NOTE — PROGRESS NOTES
Orthopaedics Office Visit - Patient Visit    ASSESSMENT/PLAN:    Assessment:   11 weeks s/p ORIF left distal femur Type B fracture, DOS 3/21/22  Continued pain and stiffness  Hypersensitivity over scar    Preop extensor lag present from patellofemoral arthroplasty  Patient denies existence this pre-op however has mentioned it repeatedly in previous visits      Plan:   Imaging and prognosis reviewed with patient , stable hardware and clinically healed fracture  Continue with physical therapy advancing motion and strength   Start on neurontin 300mg at night for burning , electric pain in her knee, also put in referral to pain management Dr Veronica Keller to assist with hypersensitivity  Maintain HEP  Activities to tolerance, see back in 6 weeks  To Do Next Visit:  Pain management     _____________________________________________________  CHIEF COMPLAINT:  No chief complaint on file  SUBJECTIVE:  Bert Perez is a 61 y o  female who presents to the office 11 weeks s/p ORIF left distal femur ORIF, DOS 3/21/22  Last appt allowed to [facility]  She is attending physical therapy  She is making incremental progress with motion  Still dealing with a lot of knee stiffness  Doesn't have confidence coming down stairs  She does have ecchymosis posterior knee not sure what or why she developed that  Still has burning and electric shocks throughout the knee       PAST MEDICAL HISTORY:  Past Medical History:   Diagnosis Date    Arthritis     Migraines        PAST SURGICAL HISTORY:  Past Surgical History:   Procedure Laterality Date    CHOLECYSTECTOMY      GALLBLADDER SURGERY      JOINT REPLACEMENT      PATELLA SURGERY  2016    Patella replacement on left knee    TX OPEN TX FEMORAL FRACTURE DISTAL MED/LAT CONDYLE Left 3/21/2022    Procedure: OPEN REDUCTION W/ INTERNAL FIXATION (ORIF) LEFT DISTAL FEMUR FRACTURE;  Surgeon: Stephanie Starkey MD;  Location: [facility] OR;  Service: Orthopedics    REPLACEMENT TOTAL KNEE Left     TONSILECTOMY AND ADNOIDECTOMY  1971    Tonsilectomy only    TONSILLECTOMY      TUBAL LIGATION  1989       FAMILY HISTORY:  Family History   Problem Relation Age of Onset    Dementia Mother     Alzheimer's disease Mother     Kidney disease Father     Hypertension Father     No Known Problems Sister     Hypertension Sister     Hyperlipidemia Sister     No Known Problems Brother     Heart attack Maternal Grandmother     Parkinsonism Maternal Grandfather     No Known Problems Paternal Grandmother     No Known Problems Paternal Grandfather     No Known Problems Brother     Scoliosis Son     Scoliosis Son     Anxiety disorder Son     Depression Son        SOCIAL HISTORY:  Social History     Tobacco Use    Smoking status: Never Smoker    Smokeless tobacco: Never Used   Vaping Use    Vaping Use: Never used   Substance Use Topics    Alcohol use: Never     Alcohol/week: 0 0 standard drinks    Drug use: Never       MEDICATIONS:    Current Outpatient Medications:     Ascorbic Acid (VITAMIN C ADULT GUMMIES PO), Take 500 mg by mouth daily, Disp: , Rfl:     aspirin (ECOTRIN LOW STRENGTH) 81 mg EC tablet, Take 1 tablet (81 mg total) by mouth every 12 (twelve) hours, Disp: 84 tablet, Rfl: 0    cholecalciferol (VITAMIN D3) 400 units tablet, Take 400 Units by mouth daily, Disp: , Rfl:     Multiple Vitamins-Minerals (MULTIVITAMIN GUMMIES ADULT PO), Take by mouth daily, Disp: , Rfl:     naproxen sodium (ALEVE) 220 MG tablet, Take 220 mg by mouth once as needed , Disp: , Rfl:     oxyCODONE (Roxicodone) 5 immediate release tablet, Take 1 tablet (5 mg total) by mouth every 4 (four) hours as needed for moderate pain Max Daily Amount: 30 mg (Patient not taking: Reported on 5/3/2022 ), Disp: 30 tablet, Rfl: 0    oxyCODONE (Roxicodone) 5 immediate release tablet, Take 1 tablet (5 mg total) by mouth every 6 (six) hours as needed for moderate pain Max Daily Amount: 20 mg, Disp: 28 tablet, Rfl: 0    rizatriptan (MAXALT) 5 MG tablet, Take 5 mg by mouth once as needed for migraine May repeat in 2 hours if needed, Disp: , Rfl:     topiramate (TOPAMAX) 25 mg tablet, Take 1 tablet (25 mg total) by mouth 2 (two) times a day, Disp: 60 tablet, Rfl: 4    vitamin E, tocopherol, 1,000 units capsule, Take 1,000 Units by mouth daily, Disp: , Rfl:     ALLERGIES:  No Known Allergies    REVIEW OF SYSTEMS:  MSK: left knee  Neuro: intact  Pertinent items are otherwise noted in HPI  A comprehensive review of systems was otherwise negative  LABS:  HgA1c: No results found for: HGBA1C  BMP:   Lab Results   Component Value Date    CALCIUM 8 4 03/22/2022    K 3 9 03/22/2022    CO2 30 03/22/2022     03/22/2022    BUN 14 03/22/2022    CREATININE 0 70 03/22/2022     CBC: No components found for: CBC    _____________________________________________________  PHYSICAL EXAMINATION:  Vital signs: There were no vitals taken for this visit  General: No acute distress, awake and alert  Psychiatric: Mood and affect appear appropriate  HEENT: Trachea Midline, No torticollis, no apparent facial trauma  Cardiovascular: No audible murmurs; Extremities appear perfused  Pulmonary: No audible wheezing or stridor  Skin: No open lesions; see further details (if any) below    MUSCULOSKELETAL EXAMINATION:  L lower Extremity: left  knee  No erythema, ecchymosis or edema  Well healed surgical incision   2-90 AROM with pain at terminal flexion  Knee flexion to aprox  90 degrees   Extremity appears warm and well perfused   NVI    _____________________________________________________  STUDIES REVIEWED:  I personally reviewed the images and interpretation is as follows:  xr left knee demonstrates stable hardware fixation distal femur with clinically healed fracture       PROCEDURES PERFORMED:  Procedures    Bryce Weaver MD

## 2022-06-16 ENCOUNTER — OFFICE VISIT (OUTPATIENT)
Dept: PHYSICAL THERAPY | Facility: CLINIC | Age: 57
End: 2022-06-16
Payer: COMMERCIAL

## 2022-06-16 DIAGNOSIS — S72.492D OTHER CLOSED FRACTURE OF DISTAL END OF LEFT FEMUR WITH ROUTINE HEALING, SUBSEQUENT ENCOUNTER: Primary | ICD-10-CM

## 2022-06-16 PROCEDURE — 97140 MANUAL THERAPY 1/> REGIONS: CPT

## 2022-06-16 PROCEDURE — 97530 THERAPEUTIC ACTIVITIES: CPT

## 2022-06-16 PROCEDURE — 97112 NEUROMUSCULAR REEDUCATION: CPT | Performed by: PHYSICAL THERAPIST

## 2022-06-16 PROCEDURE — 97110 THERAPEUTIC EXERCISES: CPT

## 2022-06-16 NOTE — PROGRESS NOTES
D/I: Patient on unit 4A Surgical/Neuro ICU   Neuro- A/Ox4. PERRL. Moving all ext purposefully. Afebrile.  CV- HR and BP stable. Tele SR with occasional PACs  Pulm- LS clear. Satting mid to high 90s on RA  GI- NPO. BS hypoactive. TF infusing at 30ml/hr  - Voiding adequately per urinal. Urine clear yellow  Gtts- TKO  Skin- Q 1H flap checks with no changes overnight. Pulse audible by both implanted and manual dopplers. Flap is slightly pale pink, soft, warm. R arm in ace wrap with JOSUE drain. R leg site CDI. L neck incison approximated with JOSUE drain x2.  Pain- Pt denies.  IV's - R and L PIV. L radial art line with appropriate waveform  See flow sheets for further interventions and assessments.   A: Stable   P: Continue to monitor pt closely. Notify MD of significant changes     Daily Note     Today's date: 2022  Patient name: Arun Monte  : 1965  MRN: [id number]  Referring provider: Heath Camacho MD  Dx:   Encounter Diagnosis     ICD-10-CM    1  Other closed fracture of distal end of left femur with routine healing, subsequent encounter  S72 492D        Start Time: 1145  Stop Time: 1233  Total time in clinic (min): 48 minutes    Subjective: Pt reports going to see her surgeon on Tuesday who was pleased with her progress  Pt reports gave her medication for the burning sensation in her LLE  Objective: See treatment diary below      Assessment: Tolerated treatment well  Patient demonstrated fatigue post treatment, exhibited good technique with therapeutic exercises and would benefit from continued PT  Pt was able to progress today by increasing sets and reps for her step up exercises  Pt was able to perform the step down exercise with improved form and decreased pain  Pt required min verbal cues to facilitate performance of proper technique  Assess pt response to treatment at next visit  Mattie Rapp, PT, DPT, participated in today's session for therapeutic exercise, therapeutic activity, and manual therapy  Plan: Continue per plan of care  Precautions: s/p ORIF left distal femur DOS 3/21/22 , Hx L patella replacement 2016  Per ortho visit on 5/3/22: ROM exercises, may progress to strengthening  [facility] LLE        Access Code: [facility]     Manuals 5/16 5/19 5/23 5/24 6/2 6/3 6/6 6/9 6/13 6/16   L knee PROM supine 8' 8' 8' 8' 8' 8' 8' 8' 7' 7'   Seated L knee flexion 1x10 10" 5' 5' 5' 5' 5' 5' 5' 5' 5'   Supine knee ext stretch 1x8 10" 1x8 10" 1x6 10" 1x6 10" 10"x5 1x5 10" 1x5 10" 1x5 10" 1x5 10" 1x6 10"   Hamstring stretch             Neuro Re-Ed             Quad set with towel under ankle  BFR /15/15/15           Quad set with LAQ 3x10 3x10 3x10 3# 3x10 3# 3x10 3# 3x10 3# 3x10 4# 3x10 4# 3x10 4# 3x10 4#   Quad set with SLR 3x10 3x10 3x10 3x15 3x15 3x15 3x15 3x15 3x10 3# 3x10 3#                Prone quad set x20 3"  10x10"          TKEs                          Ther Ex             Heel slides 3x10 5" 2x10 5" 2x10 5" 2x10 5" 2x10 5" 2x10 5" 2x10 5" 2x10 5" 2x10 5" 2x10 5"   Seated knee flexion    1x10 10" 1x10   1x10 10"      Sidelying SLR  3x10 3x10          SL heel raises 3x10 3x10 3x10 3x10 3x10 3x10 3x10  2x10 2x10   Prone quad stretch    1x10 10" 1x10 10" 1x10 10" 1x10 10" 1x10 10" 1x10 10" 1x10 10"   Pt education  3' 3' 4'         Upright bike 6' half revolutions 6' half revolutions 6' half revolutions 6' half revolutions 5' full revolutions 6' half revolutions 5' half revolutions, 4' full revolutions 6' 6'  6'    Wall sits     1x10 3" 1x10 3" 1x10 3" 2x10 3" 2x10 3"                 Ther Activity             Side step ups        2x10 8" 2x10 8" 3x10 8"   Step downs         1x5 4" 2x10 6"   Mini squats 3x10 3x10 3x10 3x10 3x10 3x10 3x10 3x10 3x10 2x10   Step ups 2x10 6" 2x10 6" 2x10 8" 2x10 8" 2x10 8" 2x10 8" 2x10 8" 2x10 8" 2x10 8" 3x10 8"   STS from 20" box  2x10 2x10 2x10 2x10 2x10 3x10 3x10 10# 3x10 10# 3x10 10#                Gait Training                                       Modalities

## 2022-06-20 ENCOUNTER — OFFICE VISIT (OUTPATIENT)
Dept: PHYSICAL THERAPY | Facility: CLINIC | Age: 57
End: 2022-06-20
Payer: COMMERCIAL

## 2022-06-20 DIAGNOSIS — S72.492D OTHER CLOSED FRACTURE OF DISTAL END OF LEFT FEMUR WITH ROUTINE HEALING, SUBSEQUENT ENCOUNTER: Primary | ICD-10-CM

## 2022-06-20 PROCEDURE — 97112 NEUROMUSCULAR REEDUCATION: CPT | Performed by: PHYSICAL THERAPIST

## 2022-06-20 PROCEDURE — 97110 THERAPEUTIC EXERCISES: CPT | Performed by: PHYSICAL THERAPIST

## 2022-06-20 PROCEDURE — 97530 THERAPEUTIC ACTIVITIES: CPT | Performed by: PHYSICAL THERAPIST

## 2022-06-20 PROCEDURE — 97140 MANUAL THERAPY 1/> REGIONS: CPT | Performed by: PHYSICAL THERAPIST

## 2022-06-20 NOTE — PROGRESS NOTES
Daily Note     Today's date: 2022  Patient name: Bella Wade  : 1965  MRN: [id number]  Referring provider: Ella Rebollar MD  Dx:   Encounter Diagnosis     ICD-10-CM    1  Other closed fracture of distal end of left femur with routine healing, subsequent encounter  S72 492D        Start Time: 1415  Stop Time: 1510  Total time in clinic (min): 55 minutes    Subjective: Pt reports she continues to have a sharp electrical pain in her L knee at random times and with certain fabrics  Objective: See treatment diary below      Assessment: Tolerated treatment well  Patient demonstrated fatigue post treatment, exhibited good technique with therapeutic exercises and would benefit from continued PT  Pt was able to progress today by increasing resistance of her STS exercise  Pt also demonstrated improved technique and decreased pain/hesitancy with her step down exercise  Pt required min verbal cues to facilitate performance of proper technique  Assess pt response to treatment at next visit  Plan: Continue per plan of care  Precautions: s/p ORIF left distal femur DOS 3/21/22 , Hx L patella replacement 2016  Per ortho visit on 5/3/22: ROM exercises, may progress to strengthening  [facility] LLE        Access Code: [facility]     Manuals 6/20 5/19 5/23 5/24 6/2 6/3 6/6 6/9 6/13 6/16   L knee PROM supine 7' 8' 8' 8' 8' 8' 8' 8' 7' 7'   Seated L knee flexion 5' 5' 5' 5' 5' 5' 5' 5' 5' 5'   Supine knee ext stretch 1x6 10" 1x8 10" 1x6 10" 1x6 10" 10"x5 1x5 10" 1x5 10" 1x5 10" 1x5 10" 1x6 10"   Hamstring stretch             Neuro Re-Ed             Quad set with towel under ankle  BFR 30/15/15/15           Quad set with LAQ 3x10 4# 3x10 3x10 3# 3x10 3# 3x10 3# 3x10 3# 3x10 4# 3x10 4# 3x10 4# 3x10 4#   Quad set with SLR 3x10 3# 3x10 3x10 3x15 3x15 3x15 3x15 3x15 3x10 3# 3x10 3#                Prone quad set   10x10"          TKEs                          Ther Ex             Heel slides 3x10 5" 2x10 5" 2x10 5" 2x10 5" 2x10 5" 2x10 5" 2x10 5" 2x10 5" 2x10 5" 2x10 5"   Seated knee flexion    1x10 10" 1x10   1x10 10"      Sidelying SLR  3x10 3x10          SL heel raises 3x10 3x10 3x10 3x10 3x10 3x10 3x10  2x10 2x10   Prone quad stretch 1x10 10"   1x10 10" 1x10 10" 1x10 10" 1x10 10" 1x10 10" 1x10 10" 1x10 10"   Pt education  3' 3' 4'         Upright bike 6'  6' half revolutions 6' half revolutions 6' half revolutions 5' full revolutions 6' half revolutions 5' half revolutions, 4' full revolutions 6' 6'  6'    Wall sits 2x10 3"    1x10 3" 1x10 3" 1x10 3" 2x10 3" 2x10 3"                 Ther Activity             Side step ups 3x10 8"        2x10 8" 2x10 8" 3x10 8"   Step downs 2x10 6"        1x5 4" 2x10 6"   TRX squats 2x10 3x10 3x10 3x10 3x10 3x10 3x10 3x10 3x10 2x10   Step ups  2x10 6" 2x10 8" 2x10 8" 2x10 8" 2x10 8" 2x10 8" 2x10 8" 2x10 8" 3x10 8"   STS from 20" box 2x10 20# 2x10 2x10 2x10 2x10 2x10 3x10 3x10 10# 3x10 10# 3x10 10#                Gait Training                                       Modalities

## 2022-06-23 ENCOUNTER — OFFICE VISIT (OUTPATIENT)
Dept: PHYSICAL THERAPY | Facility: CLINIC | Age: 57
End: 2022-06-23
Payer: COMMERCIAL

## 2022-06-23 DIAGNOSIS — S72.492D OTHER CLOSED FRACTURE OF DISTAL END OF LEFT FEMUR WITH ROUTINE HEALING, SUBSEQUENT ENCOUNTER: Primary | ICD-10-CM

## 2022-06-23 PROCEDURE — 97110 THERAPEUTIC EXERCISES: CPT | Performed by: PHYSICAL THERAPIST

## 2022-06-23 PROCEDURE — 97530 THERAPEUTIC ACTIVITIES: CPT | Performed by: PHYSICAL THERAPIST

## 2022-06-23 PROCEDURE — 97140 MANUAL THERAPY 1/> REGIONS: CPT | Performed by: PHYSICAL THERAPIST

## 2022-06-23 PROCEDURE — 97112 NEUROMUSCULAR REEDUCATION: CPT | Performed by: PHYSICAL THERAPIST

## 2022-06-23 NOTE — PROGRESS NOTES
Daily Note     Today's date: 2022  Patient name: Linda Elizondo  : 1965  MRN: [id number]  Referring provider: Kiera Finch MD  Dx:   Encounter Diagnosis     ICD-10-CM    1  Other closed fracture of distal end of left femur with routine healing, subsequent encounter  S72 492D        Start Time: 1230  Stop Time: 1318  Total time in clinic (min): 48 minutes    Subjective: Pt reports having increased stiffness in her L knee at the start of therapy today  Pt reports she continues to have burning pain in her L knee with certain fabrics on her skin  Objective: See treatment diary below      Assessment: Tolerated treatment well  Patient demonstrated fatigue post treatment, exhibited good technique with therapeutic exercises and would benefit from continued PT  Pt was able to progress today by increasing reps for her SLR exercise as well as sets for her STS exercise  Pt required min verbal cues to facilitate performance of proper technique  Assess pt response to treatment at next visit  Plan: Continue per plan of care  Precautions: s/p ORIF left distal femur DOS 3/21/22 , Hx L patella replacement 2016  Per ortho visit on 5/3/22: ROM exercises, may progress to strengthening  [facility]AT LLE        Access Code: [facility]     Manuals 6/20 6/22 5/23 5/24 6/2 6/3 6/6 6/9 6/13 6/16   L knee PROM supine 7' 7' 8' 8' 8' 8' 8' 8' 7' 7'   Seated L knee flexion 5' 5' 5' 5' 5' 5' 5' 5' 5' 5'   Supine knee ext stretch 1x6 10" 1x6 10" 1x6 10" 1x6 10" 10"x5 1x5 10" 1x5 10" 1x5 10" 1x5 10" 1x6 10"   Hamstring stretch             Neuro Re-Ed             Quad set with towel under ankle             Quad set with LAQ 3x10 4#  3x10 3# 3x10 3# 3x10 3# 3x10 3# 3x10 4# 3x10 4# 3x10 4# 3x10 4#   Quad set with SLR 3x10 3# 3x10 15# 3x10 3x15 3x15 3x15 3x15 3x15 3x10 3# 3x10 3#   Pt education  4'                                                  Ther Ex             Heel slides 3x10 5" 2x10 5" 2x10 5" 2x10 5" 2x10 5" 2x10 5" 2x10 5" 2x10 5" 2x10 5" 2x10 5"   Seated knee flexion    1x10 10" 1x10   1x10 10"      Sidelying SLR   3x10          SL heel raises 3x10 3x10 3x10 3x10 3x10 3x10 3x10  2x10 2x10   Prone quad stretch 1x10 10"   1x10 10" 1x10 10" 1x10 10" 1x10 10" 1x10 10" 1x10 10" 1x10 10"   Pt education  3' 3' 4'         Upright bike 6'  6'  6' half revolutions 6' half revolutions 5' full revolutions 6' half revolutions 5' half revolutions, 4' full revolutions 6' 6'  6'    Wall sits 2x10 3" 2x10 3"   1x10 3" 1x10 3" 1x10 3" 2x10 3" 2x10 3"                 Ther Activity             Side step ups 3x10 8"  3x10 8"      2x10 8" 2x10 8" 3x10 8"   Step downs 2x10 6" 2x10 6"       1x5 4" 2x10 6"   TRX squats 2x10 3x10 3x10 3x10 3x10 3x10 3x10 3x10 3x10 2x10   Step ups   2x10 8" 2x10 8" 2x10 8" 2x10 8" 2x10 8" 2x10 8" 2x10 8" 3x10 8"   STS from 20" box 2x10 20# 3x10 20# 2x10 2x10 2x10 2x10 3x10 3x10 10# 3x10 10# 3x10 10#                Gait Training                                       Modalities

## 2022-06-27 ENCOUNTER — OFFICE VISIT (OUTPATIENT)
Dept: PHYSICAL THERAPY | Facility: CLINIC | Age: 57
End: 2022-06-27
Payer: COMMERCIAL

## 2022-06-27 DIAGNOSIS — S72.492D OTHER CLOSED FRACTURE OF DISTAL END OF LEFT FEMUR WITH ROUTINE HEALING, SUBSEQUENT ENCOUNTER: Primary | ICD-10-CM

## 2022-06-27 PROCEDURE — 97110 THERAPEUTIC EXERCISES: CPT | Performed by: PHYSICAL THERAPIST

## 2022-06-27 PROCEDURE — 97530 THERAPEUTIC ACTIVITIES: CPT | Performed by: PHYSICAL THERAPIST

## 2022-06-27 PROCEDURE — 97140 MANUAL THERAPY 1/> REGIONS: CPT | Performed by: PHYSICAL THERAPIST

## 2022-06-27 PROCEDURE — 97112 NEUROMUSCULAR REEDUCATION: CPT | Performed by: PHYSICAL THERAPIST

## 2022-06-27 NOTE — PROGRESS NOTES
Daily Note     Today's date: 2022  Patient name: Uriel Beltre  : 1965  MRN: [id number]  Referring provider: Elke Miller MD  Dx:   Encounter Diagnosis     ICD-10-CM    1  Other closed fracture of distal end of left femur with routine healing, subsequent encounter  S72 492D        Start Time: 1500  Stop Time: 1545  Total time in clinic (min): 45 minutes    Subjective: Pt reports she continues to perform her home exercise program to success, however, she continues to have difficulty with bending her L knee and going down stairs  Objective: See treatment diary below      Assessment: Tolerated treatment well  Patient demonstrated fatigue post treatment, exhibited good technique with therapeutic exercises and would benefit from continued PT  Pt was able to achieve 108 degrees of L knee flexion with therapist overpressure at today's session  Pt continues to have increased difficulty with step downs secondary to weakness and limited flexion of her L knee  Pt required min verbal cues to facilitate performance of proper technique  Assess pt response to treatment at next visit  Plan: Continue per plan of care  Precautions: s/p ORIF left distal femur DOS 3/21/22 , Hx L patella replacement 2016  Per ortho visit on 5/3/22: ROM exercises, may progress to strengthening  [facility]AT LLE        Access Code: [facility]     Manuals 6/20 6/22 6/27 5/24 6/2 6/3 6/6 6/9 6/13 6/16   L knee PROM supine 7' 7' 8' 8' 8' 8' 8' 8' 7' 7'   Seated L knee flexion 5' 5' 5' 5' 5' 5' 5' 5' 5' 5'   Supine knee ext stretch 1x6 10" 1x6 10" 1x6 10" 1x6 10" 10"x5 1x5 10" 1x5 10" 1x5 10" 1x5 10" 1x6 10"   Hamstring stretch             Neuro Re-Ed             Quad set with towel under ankle             Quad set with LAQ 3x10 4#   3x10 3# 3x10 3# 3x10 3# 3x10 4# 3x10 4# 3x10 4# 3x10 4#   Quad set with SLR 3x10 3# 3x10 15# 3x12 3# 3x15 3x15 3x15 3x15 3x15 3x10 3# 3x10 3#   Pt education  4' 4'          Sidestepping c band   4x 48' grn                                    Ther Ex             Heel slides 3x10 5" 2x10 5" 2x10 5" 2x10 5" 2x10 5" 2x10 5" 2x10 5" 2x10 5" 2x10 5" 2x10 5"   Seated knee flexion     1x10   1x10 10"      Sidelying SLR             SL heel raises 3x10 3x10  3x10 3x10 3x10 3x10  2x10 2x10   Prone quad stretch 1x10 10"  1x10 10" 1x10 10" 1x10 10" 1x10 10" 1x10 10" 1x10 10" 1x10 10" 1x10 10"   Pt education             Upright bike 6'  6'  6'  6' half revolutions 5' full revolutions 6' half revolutions 5' half revolutions, 4' full revolutions 6' 6'  6'    Wall sits 2x10 3" 2x10 3" 2x10 3"  1x10 3" 1x10 3" 1x10 3" 2x10 3" 2x10 3"                 Ther Activity             Side step ups 3x10 8"  3x10 8" 3x10 8"     2x10 8" 2x10 8" 3x10 8"   Step downs 2x10 6" 2x10 6" 2x10 6"      1x5 4" 2x10 6"   TRX squats 2x10 3x10 3x10 3x10 3x10 3x10 3x10 3x10 3x10 2x10   Step ups    2x10 8" 2x10 8" 2x10 8" 2x10 8" 2x10 8" 2x10 8" 3x10 8"   STS from 20" box 2x10 20# 3x10 20# 3x10 20# 2x10 2x10 2x10 3x10 3x10 10# 3x10 10# 3x10 10#                Gait Training                                       Modalities

## 2022-06-30 ENCOUNTER — OFFICE VISIT (OUTPATIENT)
Dept: PHYSICAL THERAPY | Facility: CLINIC | Age: 57
End: 2022-06-30
Payer: COMMERCIAL

## 2022-06-30 DIAGNOSIS — S72.492D OTHER CLOSED FRACTURE OF DISTAL END OF LEFT FEMUR WITH ROUTINE HEALING, SUBSEQUENT ENCOUNTER: Primary | ICD-10-CM

## 2022-06-30 PROCEDURE — 97110 THERAPEUTIC EXERCISES: CPT | Performed by: PHYSICAL THERAPIST

## 2022-06-30 PROCEDURE — 97530 THERAPEUTIC ACTIVITIES: CPT | Performed by: PHYSICAL THERAPIST

## 2022-06-30 PROCEDURE — 97112 NEUROMUSCULAR REEDUCATION: CPT | Performed by: PHYSICAL THERAPIST

## 2022-06-30 NOTE — PROGRESS NOTES
PT Re-Evaluation     Today's date: 2022  Patient name: Uriel Beltre  : 1965  MRN: [id number]  Referring provider: Elke Miller MD  Dx:   Encounter Diagnosis     ICD-10-CM    1  Other closed fracture of distal end of left femur with routine healing, subsequent encounter  S72 [id number]                   Assessment  Assessment details: Upon re-examination, pt has improved strength and ROM of her L knee as well as decreased pain with functional activities  Pt's FOTO score has improved, demonstrating an improved ability to perform functional activities  Pt is also now able to ambulate with no AD and ascend stairs with a step over step pattern  However, pt continues to have deficits of strength and ROM of her LLE as well as having increased pain and difficulty performing functional activities  Pt also continues to have difficulty with descending stairs with a step over step pattern  Pt plan of care will focus on the previously mentioned deficits and limitations  Pt would continue to benefit from skilled physical therapy to facilitate a safe return to her prior level of function  Impairments: abnormal gait, abnormal or restricted ROM, activity intolerance, impaired balance, impaired physical strength, lacks appropriate home exercise program, pain with function and weight-bearing intolerance    Symptom irritability: moderateUnderstanding of Dx/Px/POC: excellent   Prognosis: good    Goals  STG to be achieved in 4 weeks: The patient will report a decrease in left knee "at worst" subjective pain rating score to at least 5/10 to allow for improved tolerance for weightbearing activities  Ongoing   The patient will increase left knee flexion AROM to at least 90 degrees to allow for improved functional mobility  MET  The patient will increase left knee extension MMT score to at least 4/5 to allow for improved functional mobility  MET    LTG to be achieved by DC:    The patient will be independent in comprehensive HEP  The patient will report no pain with usual activities  Ongoing  The patient will improve left knee AROM to [facility] to allow for improved functional mobility  Ongoing  The patient will increase left knee MMT score to [facility] to allow for improved functional mobility  Ongoing  The patient will be able to negotiate the stairs in her home in a reciprocal gait pattern without difficulty  Ongoing  The patient will be able to ambulate one mile without difficulty  Ongoing      Plan  Patient would benefit from: skilled physical therapy  Planned modality interventions: thermotherapy: hydrocollator packs, TENS and cryotherapy  Planned therapy interventions: manual therapy, joint mobilization, balance/weight bearing training, neuromuscular re-education, patient education, flexibility, strengthening, stretching, therapeutic activities, therapeutic exercise, gait training and home exercise program  Frequency: 2x week  Duration in weeks: 8  Plan of Care beginning date: 2022  Plan of Care expiration date: 2022  Treatment plan discussed with: patient        Subjective Evaluation    History of Present Illness  Date of surgery: 3/21/2022  Mechanism of injury: Bev Gordillo presents to therapy s/p ORIF left distal femur, DOS 3/21/22  Pt reports having a significant improvement in strength and ROM of her LLE, however, she does still have difficulty with bending and straightening her L knee  Pt reports she is now able perform stair with a step over step pattern, however, she is having difficulty with going down stairs  Pt reports she is also having getting into and out of the shower  Pt reports she overall has improved since starting therapy  Pt reports feeling 75% of her prior level of function before the accident    Pain  Current pain rating: 3  At best pain rating: 3  At worst pain ratin  Location: L knee   Quality: sharp and discomfort  Relieving factors: medications, ice and change in position  Aggravating factors: standing, sitting, walking and stair climbing    Social Support  Steps to enter house: yes  Stairs in house: yes   Lives in: multiple-level home  Lives with: adult children    Employment status: not working  Treatments  Previous treatment: immobilization and medication  Current treatment: physical therapy  Patient Goals  Patient goals for therapy: decreased pain, increased motion, improved balance, independence with ADLs/IADLs and increased strength  Patient goal: start walking again         Objective     Observations     Additional Observation Details  (+) incision on medial aspect of L knee  Approximately 13 cm in length  Well approximated, no signs of infection    Active Range of Motion   Left Hip   Flexion: 105 degrees   Abduction: 30 degrees     Right Hip   Flexion: 110 degrees   Abduction: 30 degrees   Left Knee   Flexion: 106 degrees with pain  Extension: 0 degrees with pain    Right Knee   Flexion: WFL  Extension: WFL    Passive Range of Motion   Left Knee   Flexion: 110 degrees with pain    Strength/Myotome Testing     Left Hip   Planes of Motion   Flexion: 4  Abduction: 4    Right Hip   Planes of Motion   Flexion: 5  Abduction: 5    Left Knee   Flexion: 4-  Extension: 4    Right Knee   Flexion: 5  Extension: 5    Left Ankle/Foot   Dorsiflexion: 4+  Plantar flexion: 4+    Right Ankle/Foot   Dorsiflexion: 5  Plantar flexion: 5    Ambulation   Weight-Bearing Status   Weight-Bearing Status (Left): non-weight-bearing   Assistive device used: crutches             Precautions: s/p ORIF left distal femur DOS 3/21/22 , Hx L patella replacement 2016  Per ortho visit on 5/3/22: ROM exercises, may progress to strengthening  WBAT LLE        Access Code: [facility]     Manuals 6/20 6/22 6/27 6/30 6/2 6/3 6/6 6/9 6/13 6/16   L knee PROM supine 7' 7' 8' 5' 8' 8' 8' 8' 7' 7'   Seated L knee flexion 5' 5' 5' 3' 5' 5' 5' 5' 5' 5'   Supine knee ext stretch 1x6 10" 1x6 10" 1x6 10" 1x6 10" 10"x5 1x5 10" 1x5 10" 1x5 10" 1x5 10" 1x6 10"   Hamstring stretch             Neuro Re-Ed             Quad set with towel under ankle             Quad set with LAQ 3x10 4#    3x10 3# 3x10 3# 3x10 4# 3x10 4# 3x10 4# 3x10 4#   Quad set with SLR 3x10 3# 3x10 15# 3x12 3# 3x12 3# 3x15 3x15 3x15 3x15 3x10 3# 3x10 3#   Pt education  4' 4' 4'         Sidestepping c band   4x 50' grn 4x 50' grn                                   Ther Ex             Heel slides 3x10 5" 2x10 5" 2x10 5" 2x10 5" 2x10 5" 2x10 5" 2x10 5" 2x10 5" 2x10 5" 2x10 5"   Seated knee flexion        1x10 10"      Sidelying SLR             SL heel raises 3x10 3x10  3x10 3x10 3x10 3x10  2x10 2x10   Prone quad stretch 1x10 10"  1x10 10" 1x10 10" 1x10 10" 1x10 10" 1x10 10" 1x10 10" 1x10 10" 1x10 10"   Pt education             Upright bike 6'  6'  6'  6' 5' full revolutions 6' half revolutions 5' half revolutions, 4' full revolutions 6' 6'  6'    Wall sits 2x10 3" 2x10 3" 2x10 3" 2x10 3" 1x10 3" 1x10 3" 1x10 3" 2x10 3" 2x10 3"                 Ther Activity             Side step ups 3x10 8"  3x10 8" 3x10 8" 3x10 8"    2x10 8" 2x10 8" 3x10 8"   Step downs 2x10 6" 2x10 6" 2x10 6" 2x10 6"     1x5 4" 2x10 6"   TRX squats 2x10 3x10 3x10 3x10 3x10 3x10 3x10 3x10 3x10 2x10   Step ups     2x10 8" 2x10 8" 2x10 8" 2x10 8" 2x10 8" 3x10 8"   STS from 20" box 2x10 20# 3x10 20# 3x10 20# 3x10 20# 2x10 2x10 3x10 3x10 10# 3x10 10# 3x10 10#                Gait Training                                       Modalities

## 2022-06-30 NOTE — PROGRESS NOTES
Daily Note     Today's date: 2022  Patient name: Arun Monte  : 1965  MRN: [id number]  Referring provider: Heath Camacho MD  Dx:   Encounter Diagnosis     ICD-10-CM    1  Other closed fracture of distal end of left femur with routine healing, subsequent encounter  S55 922K                   Subjective: Pt reports she continues to have stiffness in the morning and difficulty bending her L knee  Objective: See treatment diary below      Assessment: Tolerated treatment {Tolerated treatment :7602823099}  Patient {assessment:8192567298}      Plan: {PLAN:[id number]}     Precautions: s/p ORIF left distal femur DOS 3/21/22 , Hx L patella replacement 2016  Per ortho visit on 5/3/22: ROM exercises, may progress to strengthening  WBAT LLE        Access Code: [facility]     Manuals 6/20 6/22 6/27 5/24 6/2 6/3 6/6 6/9 6/13 6/16   L knee PROM supine 7' 7' 8' 8' 8' 8' 8' 8' 7' 7'   Seated L knee flexion 5' 5' 5' 5' 5' 5' 5' 5' 5' 5'   Supine knee ext stretch 1x6 10" 1x6 10" 1x6 10" 1x6 10" 10"x5 1x5 10" 1x5 10" 1x5 10" 1x5 10" 1x6 10"   Hamstring stretch             Neuro Re-Ed             Quad set with towel under ankle             Quad set with LAQ 3x10 4#   3x10 3# 3x10 3# 3x10 3# 3x10 4# 3x10 4# 3x10 4# 3x10 4#   Quad set with SLR 3x10 3# 3x10 15# 3x12 3# 3x15 3x15 3x15 3x15 3x15 3x10 3# 3x10 3#   Pt education  4' 4'          Sidestepping c band   4x 50' grn                                    Ther Ex             Heel slides 3x10 5" 2x10 5" 2x10 5" 2x10 5" 2x10 5" 2x10 5" 2x10 5" 2x10 5" 2x10 5" 2x10 5"   Seated knee flexion     1x10   1x10 10"      Sidelying SLR             SL heel raises 3x10 3x10  3x10 3x10 3x10 3x10  2x10 2x10   Prone quad stretch 1x10 10"  1x10 10" 1x10 10" 1x10 10" 1x10 10" 1x10 10" 1x10 10" 1x10 10" 1x10 10"   Pt education             Upright bike 6'  6'  6'  6' half revolutions 5' full revolutions 6' half revolutions 5' half revolutions, 4' full revolutions 6' 6'  6'    Wall sits 2x10 3" 2x10 3" 2x10 3"  1x10 3" 1x10 3" 1x10 3" 2x10 3" 2x10 3"                 Ther Activity             Side step ups 3x10 8"  3x10 8" 3x10 8"     2x10 8" 2x10 8" 3x10 8"   Step downs 2x10 6" 2x10 6" 2x10 6"      1x5 4" 2x10 6"   TRX squats 2x10 3x10 3x10 3x10 3x10 3x10 3x10 3x10 3x10 2x10   Step ups    2x10 8" 2x10 8" 2x10 8" 2x10 8" 2x10 8" 2x10 8" 3x10 8"   STS from 20" box 2x10 20# 3x10 20# 3x10 20# 2x10 2x10 2x10 3x10 3x10 10# 3x10 10# 3x10 10#                Gait Training                                       Modalities

## 2022-06-30 NOTE — PROGRESS NOTES
Assessment:  1  Chronic pain syndrome    2  Chronic pain of left knee    3  Other closed fracture of distal end of left femur, initial encounter [facility])        Plan:  My impressions and treatment recommendations were discussed in detail with the patient, who verbalized understanding and had no further questions  The patient is reporting pain primarily involving her left knee  It is primarily on the most distal edge of her recent surgery for her left femur  She reports that the area surrounding the scar is hypersensitive  She is also reporting some pain under her patellar region  At this point, I did feel it reasonable to prescribe the patient a compounding cream to be applied to the affected area up to 4 times daily as needed for pain relief  Side effects were reviewed with the patient  I also encouraged the patient to trial the gabapentin that her orthopedic surgeon had prescribed for her  She has not yet picked up his medication from the pharmacy  Follow-up is planned in 4 weeks time or sooner as warranted  Discharge instructions were provided  I personally saw and examined the patient and I agree with the above discussed plan of care  History of Present Illness:    Lindsay Sena is a 61 y o  female who presents to [facility] for initial evaluation of the above stated pain complaints  The patient has a past medical and chronic pain history as outlined in the assessment section  She was referred by Darrius Ramirez MD     The patient is reporting pain primarily involving the left knee  She reports that her symptoms are involving the scar where she recently had left femoral surgery  She describes her injury after a fall accident on March 16, 2022  She describes her pain as moderate to severe and 4 and 5/10 on the verbal numerical pain rating scale  Her pain is intermittent in nature  She describes her pain as worse in the morning, evening, and night    She describes her pain as burning, shooting, numbness, and sharp  She reports weakness in her left lower back region  She does not ambulate with any assistive devices  Lying down, standing, bending, sitting, and walking increases pain  There are no pain relieving factors  Hydrocodone and oxycodone reused in the past   Celebrex and naproxen were used in the past   She is not currently using any medications for her pain  Review of Systems:    Review of Systems   Constitutional: Negative for fever and unexpected weight change  HENT: Negative for trouble swallowing  Eyes: Negative for visual disturbance  Respiratory: Negative for shortness of breath and wheezing  Cardiovascular: Negative for chest pain and palpitations  Gastrointestinal: Negative for constipation, diarrhea, nausea and vomiting  Endocrine: Negative for cold intolerance, heat intolerance and polydipsia  Genitourinary: Negative for difficulty urinating and frequency  Musculoskeletal: Negative for arthralgias, gait problem, joint swelling and myalgias  Skin: Negative for rash  Neurological: Negative for dizziness, seizures, syncope, weakness and headaches  Hematological: Does not bruise/bleed easily  Psychiatric/Behavioral: Negative for dysphoric mood           Patient Active Problem List   Diagnosis    Intractable migraine without aura and without status migrainosus    Closed fracture of left distal femur (HCC)    Postoperative hypoxia    Postoperative pain    Migraine    Chronic pain syndrome    Chronic pain of left knee       Past Medical History:   Diagnosis Date    Arthritis     Migraines        Past Surgical History:   Procedure Laterality Date    CHOLECYSTECTOMY      GALLBLADDER SURGERY      JOINT REPLACEMENT      PATELLA SURGERY  2016    Patella replacement on left knee    NH OPEN TX FEMORAL FRACTURE DISTAL MED/LAT CONDYLE Left 3/21/2022    Procedure: OPEN REDUCTION W/ INTERNAL FIXATION (ORIF) LEFT DISTAL FEMUR FRACTURE;  Surgeon: Nay Messer MD;  Location: [facility];  Service: Orthopedics    REPLACEMENT TOTAL KNEE Left     TONSILECTOMY AND ADNOIDECTOMY  1971    Tonsilectomy only    TONSILLECTOMY      TUBAL LIGATION  1989       Family History   Problem Relation Age of Onset    Dementia Mother     Alzheimer's disease Mother     Kidney disease Father     Hypertension Father     No Known Problems Sister     Hypertension Sister     Hyperlipidemia Sister     No Known Problems Brother     Heart attack Maternal Grandmother     Parkinsonism Maternal Grandfather     No Known Problems Paternal Grandmother     No Known Problems Paternal Grandfather     No Known Problems Brother     Scoliosis Son     Scoliosis Son     Anxiety disorder Son     Depression Son        Social History     Occupational History    Not on file   Tobacco Use    Smoking status: Never Smoker    Smokeless tobacco: Never Used   Vaping Use    Vaping Use: Never used   Substance and Sexual Activity    Alcohol use: Never     Alcohol/week: 0 0 standard drinks    Drug use: Never    Sexual activity: Not on file         Current Outpatient Medications:     Ascorbic Acid (VITAMIN C ADULT GUMMIES PO), Take 500 mg by mouth daily, Disp: , Rfl:     cholecalciferol (VITAMIN D3) 400 units tablet, Take 400 Units by mouth daily, Disp: , Rfl:     Multiple Vitamins-Minerals (MULTIVITAMIN GUMMIES ADULT PO), Take by mouth daily, Disp: , Rfl:     naproxen sodium (ALEVE) 220 MG tablet, Take 220 mg by mouth once as needed , Disp: , Rfl:     vitamin E, tocopherol, 1,000 units capsule, Take 1,000 Units by mouth daily, Disp: , Rfl:     aspirin (ECOTRIN LOW STRENGTH) 81 mg EC tablet, Take 1 tablet (81 mg total) by mouth every 12 (twelve) hours, Disp: 84 tablet, Rfl: 0    gabapentin (Neurontin) 300 mg capsule, Take 1 capsule (300 mg total) by mouth daily at bedtime (Patient not taking: Reported on 7/5/2022), Disp: 30 capsule, Rfl: 1    oxyCODONE (Roxicodone) 5 immediate release tablet, Take 1 tablet (5 mg total) by mouth every 4 (four) hours as needed for moderate pain Max Daily Amount: 30 mg (Patient not taking: No sig reported), Disp: 30 tablet, Rfl: 0    oxyCODONE (Roxicodone) 5 immediate release tablet, Take 1 tablet (5 mg total) by mouth every 6 (six) hours as needed for moderate pain Max Daily Amount: 20 mg (Patient not taking: Reported on 7/5/2022), Disp: 28 tablet, Rfl: 0    rizatriptan (MAXALT) 5 MG tablet, Take 5 mg by mouth once as needed for migraine May repeat in 2 hours if needed (Patient not taking: Reported on 7/5/2022), Disp: , Rfl:     topiramate (TOPAMAX) 25 mg tablet, Take 1 tablet (25 mg total) by mouth 2 (two) times a day (Patient not taking: Reported on 7/5/2022), Disp: 60 tablet, Rfl: 4    No Known Allergies    Physical Exam:    BP (!) 171/92 (BP Location: Right arm, Patient Position: Sitting, Cuff Size: Standard)   Pulse 74   Wt 62 1 kg (137 lb)   BMI 23 52 kg/m²     Constitutional: normal, well developed, well nourished, alert, in no distress and non-toxic and no overt pain behavior  Eyes: anicteric  HEENT: grossly intact  Neck: supple, symmetric, trachea midline and no masses   Pulmonary:even and unlabored  Cardiovascular:No edema or pitting edema present  Skin:Normal without rashes or lesions and well hydrated  Psychiatric:Mood and affect appropriate  Neurologic:Cranial Nerves II-XII grossly intact  Musculoskeletal:normal , mild edema noted of the left knee as compared to the right  There is no change in color noted  There are no skin or nail changes noted of the left lower extremity  Pinwheel testing produces hypersensitivity on the distal edge of her scar  There is no allodynia or hyperesthesia noted on my examination      Imaging    XR CERVICAL SPINE 2 OR 3 VW  6/22/21    Anatomical Region Laterality Modality   C-spine -- Computed Radiography   T-spine -- --   L-spine -- --   Neck -- --   Spine -- -- Impression    IMPRESSION: Disc space narrowing C3-4 through C6-7 on this limited 2 view study  Workstation:[id number]    Narrative    Study: Limited 2 views cervical spine  COMPARISON: No prior study  HISTORY: Segmental dysfunction  Reversal of normal cervical lordosis at C4-5  Marked disc space narrowing C3-4   through C5-6 with moderate narrowing at C6-7  No acute compression deformities,   lytic, blastic lesions  Posterior apophyseal joints intact  Exuberant spurring   anteriorly C3-4  Procedure Note    Ernesto Cox MD - 06/22/2021   Formatting of this note might be different from the original    Study: Limited 2 views cervical spine  COMPARISON: No prior study  HISTORY: Segmental dysfunction  Reversal of normal cervical lordosis at C4-5  Marked disc space narrowing C3-4   through C5-6 with moderate narrowing at C6-7  No acute compression deformities,   lytic, blastic lesions  Posterior apophyseal joints intact  Exuberant spurring   anteriorly C3-4  IMPRESSION:   IMPRESSION: Disc space narrowing C3-4 through C6-7 on this limited 2 view study  XR LUMBAR SPINE 2 OR 3 VW    6/22/21    Anatomical Region Laterality Modality   T-spine -- Computed Radiography   L-spine -- --   Pelvis -- --   Spine -- --       Impression    IMPRESSION: Marked disc space disease T11-12  Posterior arthropathy L4-5 and L5-S1 apophyseal joints  Workstation:[id number]    Narrative    Study: Limited 2 view lumbar spine  COMPARISON: No prior study  HISTORY: Lumbar facet syndrome  5 lumbar segments in anatomic alignment position with no compression   deformities, lytic, blastic lesions  Marked disc space narrowing T11-12 with   eburnation about the endplates and mild spurring  Disc space about the lumbar   spine intact  Narrowing and sclerosis L4-5 and L5-S1 apophyseal joints  SI joint   symmetrically patent      Procedure Note    Ernesto Cox MD - 06/23/2021   Formatting of this note might be different from the original    Study: Limited 2 view lumbar spine  COMPARISON: No prior study  HISTORY: Lumbar facet syndrome  5 lumbar segments in anatomic alignment position with no compression   deformities, lytic, blastic lesions  Marked disc space narrowing T11-12 with   eburnation about the endplates and mild spurring  Disc space about the lumbar   spine intact  Narrowing and sclerosis L4-5 and L5-S1 apophyseal joints  SI joint   symmetrically patent  IMPRESSION:   IMPRESSION: Marked disc space disease T11-12  Posterior arthropathy L4-5 and L5-S1 apophyseal joints  No orders to display       No orders of the defined types were placed in this encounter

## 2022-07-05 ENCOUNTER — APPOINTMENT (OUTPATIENT)
Dept: PHYSICAL THERAPY | Facility: CLINIC | Age: 57
End: 2022-07-05
Payer: COMMERCIAL

## 2022-07-05 ENCOUNTER — CONSULT (OUTPATIENT)
Dept: PAIN MEDICINE | Facility: CLINIC | Age: 57
End: 2022-07-05
Payer: COMMERCIAL

## 2022-07-05 VITALS
BODY MASS INDEX: 23.52 KG/M2 | HEART RATE: 74 BPM | DIASTOLIC BLOOD PRESSURE: 92 MMHG | WEIGHT: 137 LBS | SYSTOLIC BLOOD PRESSURE: 171 MMHG

## 2022-07-05 DIAGNOSIS — S72.492A OTHER CLOSED FRACTURE OF DISTAL END OF LEFT FEMUR, INITIAL ENCOUNTER (HCC): ICD-10-CM

## 2022-07-05 DIAGNOSIS — M25.562 CHRONIC PAIN OF LEFT KNEE: ICD-10-CM

## 2022-07-05 DIAGNOSIS — G89.29 CHRONIC PAIN OF LEFT KNEE: ICD-10-CM

## 2022-07-05 DIAGNOSIS — G89.4 CHRONIC PAIN SYNDROME: Primary | ICD-10-CM

## 2022-07-05 PROCEDURE — 99244 OFF/OP CNSLTJ NEW/EST MOD 40: CPT | Performed by: ANESTHESIOLOGY

## 2022-07-06 ENCOUNTER — TELEPHONE (OUTPATIENT)
Dept: PAIN MEDICINE | Facility: CLINIC | Age: 57
End: 2022-07-06

## 2022-07-06 PROBLEM — G89.4 CHRONIC PAIN SYNDROME: Status: ACTIVE | Noted: 2022-07-06

## 2022-07-06 PROBLEM — G89.29 CHRONIC PAIN OF LEFT KNEE: Status: ACTIVE | Noted: 2022-07-06

## 2022-07-06 PROBLEM — M25.562 CHRONIC PAIN OF LEFT KNEE: Status: ACTIVE | Noted: 2022-07-06

## 2022-07-06 NOTE — PATIENT INSTRUCTIONS
Knee Pain   WHAT YOU NEED TO KNOW:   What do I need to know about knee pain? Knee pain may start suddenly, or it may be a long-term problem  You may have pain on the side, front, or back of your knee  You may have knee stiffness and swelling  You may hear popping sounds or feel like your knee is giving way or locking up as you walk  You may feel pain when you sit, stand, walk, or climb up and down stairs  What increases my risk for knee pain? Obesity    A strain or tear in ligaments or tendons    A leg fracture or knee dislocation    Overuse of your knee    Osteoarthritis, rheumatoid arthritis, or gout    An infection, tumor, or cyst in your knee    Shoes that are not supportive, or training on a hard surface    Sports that involve jumping or pivoting on your knee    How is the cause of knee pain diagnosed? Your healthcare provider will examine your knee and ask about your symptoms  Tell your provider when the pain started and what you were doing at the time  Describe the pain, such as sharp, throbbing, or achy  Tell your provider about any knee injury or surgery you had  You may need any of the following:  MRI, CT, or ultrasound  pictures may show an injury, fracture, or tumor  Blood tests  may be used to check the level of inflammation in your blood  The tests may also show signs of infection  Arthroscopy  is a procedure to look inside your knee joint with an arthroscope  An arthroscope is a flexible tube with a light and camera on the end  A knee arthroscopy is usually done to check for disease or damage inside your knee  These problems may be fixed during the procedure  How is knee pain treated? Treatment will depend on the cause of your pain  You may need any of the following:  NSAIDs  help decrease swelling and pain or fever  This medicine is available with or without a doctor's order  NSAIDs can cause stomach bleeding or kidney problems in certain people   If you take blood thinner medicine, always ask your healthcare provider if NSAIDs are safe for you  Always read the medicine label and follow directions  Acetaminophen  decreases pain and fever  It is available without a doctor's order  Ask how much to take and how often to take it  Follow directions  Read the labels of all other medicines you are using to see if they also contain acetaminophen, or ask your doctor or pharmacist  Acetaminophen can cause liver damage if not taken correctly  Do not use more than 4 grams (4,000 milligrams) total of acetaminophen in one day  Prescription pain medicine  may be given  Ask your healthcare provider how to take this medicine safely  Some prescription pain medicines contain acetaminophen  Do not take other medicines that contain acetaminophen without talking to your healthcare provider  Too much acetaminophen may cause liver damage  Prescription pain medicine may cause constipation  Ask your healthcare provider how to prevent or treat constipation  Steroid injections  may be given into your knee  Steroids reduce inflammation and pain  Surgery  may be used for some injuries, such as to repair a torn ACL  What can I do to manage my symptoms? Rest your knee so it can heal   Limit activities that increase your pain  Do low-impact exercises, such as walking or swimming  Apply ice to help reduce swelling and pain  Use an ice pack, or put crushed ice in a plastic bag  Cover it with a towel before you apply it to your knee  Apply ice for 15 to 20 minutes every hour, or as directed  Apply compression to help reduce swelling  Use a brace or bandage only as directed  Elevate your knee to help decrease pain and swelling  Elevate your knee while you are sitting or lying down  Prop your leg on pillows to keep your knee above the level of your heart  Prevent your knee from moving as directed  Your healthcare provider may put on a cast or splint   You may need to wear a leg brace to stabilize your knee  A leg brace can be adjusted to increase your range of motion as your knee heals  What can I do to prevent knee pain? Maintain a healthy weight  Extra weight increases your risk for knee pain  Ask your healthcare provider how much you should weigh  He or she can help you create a safe weight loss plan if you need to lose weight  Exercise or train properly  Use the correct equipment for sports  Wear shoes that provide good support  Check your posture often as you exercise, play sports, or train for an event  This can help prevent stress and strain on your knees  Rest between sessions so you do not overwork your knees  When should I seek immediate care? Your pain is worse, even after treatment  You cannot bend or straighten your leg completely  The swelling around your knee does not go down even with treatment  Your knee is painful and hot to the touch  When should I contact my healthcare provider? You have questions or concerns about your condition or care  CARE AGREEMENT:   You have the right to help plan your care  Learn about your health condition and how it may be treated  Discuss treatment options with your healthcare providers to decide what care you want to receive  You always have the right to refuse treatment  The above information is an  only  It is not intended as medical advice for individual conditions or treatments  Talk to your doctor, nurse or pharmacist before following any medical regimen to see if it is safe and effective for you  © Copyright [employer] 2022 Information is for End User's use only and may not be sold, redistributed or otherwise used for commercial purposes   All illustrations and images included in CareNotes® are the copyrighted property of [employer]  or [address] [place] [address] [employer]

## 2022-07-07 ENCOUNTER — APPOINTMENT (OUTPATIENT)
Dept: PHYSICAL THERAPY | Facility: CLINIC | Age: 57
End: 2022-07-07
Payer: COMMERCIAL

## 2022-07-08 NOTE — TELEPHONE ENCOUNTER
Left patient a detailed msg on the status of the auth   It was denied, waiting on fax requesting additional information to do an appeal

## 2022-07-11 ENCOUNTER — OFFICE VISIT (OUTPATIENT)
Dept: PHYSICAL THERAPY | Facility: CLINIC | Age: 57
End: 2022-07-11
Payer: COMMERCIAL

## 2022-07-11 DIAGNOSIS — S72.492D OTHER CLOSED FRACTURE OF DISTAL END OF LEFT FEMUR WITH ROUTINE HEALING, SUBSEQUENT ENCOUNTER: Primary | ICD-10-CM

## 2022-07-11 PROCEDURE — 97530 THERAPEUTIC ACTIVITIES: CPT | Performed by: PHYSICAL THERAPIST

## 2022-07-11 PROCEDURE — 97140 MANUAL THERAPY 1/> REGIONS: CPT | Performed by: PHYSICAL THERAPIST

## 2022-07-11 PROCEDURE — 97112 NEUROMUSCULAR REEDUCATION: CPT | Performed by: PHYSICAL THERAPIST

## 2022-07-11 PROCEDURE — 97110 THERAPEUTIC EXERCISES: CPT | Performed by: PHYSICAL THERAPIST

## 2022-07-11 NOTE — PROGRESS NOTES
Daily Note     Today's date: 2022  Patient name: Yevgeniy Perez  : 1965  MRN: [id number]  Referring provider: Faisal Ga MD  Dx:   Encounter Diagnosis     ICD-10-CM    1  Other closed fracture of distal end of left femur with routine healing, subsequent encounter  S72 492D        Start Time: 1015  Stop Time: 1100  Total time in clinic (min): 45 minutes    Subjective: Pt reports her L knee continues to have increased pain and stiffness  Pt reports the medication she was given, has been triggering her migraines  Objective: See treatment diary below      Assessment: Tolerated treatment well  Patient demonstrated fatigue post treatment, exhibited good technique with therapeutic exercises and would benefit from continued PT  Pt was able to progress today with inclusion of standing knee flexion as well as increasing time on the bike  Pt demonstrated improved L knee flexion after performance of PROM and posterior mobilizations  Pt required min verbal cues to facilitate performance of proper technique  Assess pt response to treatment at next visit  Plan: Continue per plan of care  Precautions: s/p ORIF left distal femur DOS 3/21/22 , Hx L patella replacement 2016  Per ortho visit on 5/3/22: ROM exercises, may progress to strengthening  WBAT LLE        Access Code: [facility]     Manuals  6   L knee PROM/mobs supine 7' 7' 8' 5' 8'  8' 8' 7' 7'   Seated L knee flexion 5' 5' 5' 3' 5'  5' 5' 5' 5'   Supine knee ext stretch 1x6 10" 1x6 10" 1x6 10" 1x6 10" 10"x5  1x5 10" 1x5 10" 1x5 10" 1x6 10"   Hamstring stretch             Neuro Re-Ed             Quad set with towel under ankle             Quad set with LAQ 3x10 4#      3x10 4# 3x10 4# 3x10 4# 3x10 4#   Quad set with SLR 3x10 3# 3x10 15# 3x12 3# 3x12 3# 3x10 4#  3x15 3x15 3x10 3# 3x10 3#   Pt education  4' 4' 4' 4'        Sidestepping c band   4x 48' grn 4x 48' grn 4x 48' grn Ther Ex             Heel slides 3x10 5" 2x10 5" 2x10 5" 2x10 5" 2x10 5"  2x10 5" 2x10 5" 2x10 5" 2x10 5"   Standing knee flexion     2x10 5#        Sidelying SLR             SL heel raises 3x10 3x10  3x10 3x10 5#  3x10  2x10 2x10   Prone quad stretch 1x10 10"  1x10 10" 1x10 10" 1x10 10"  1x10 10" 1x10 10" 1x10 10" 1x10 10"   Pt education             Upright bike 6'  6'  6'  6' 7'  5' half revolutions, 4' full revolutions 6' 6'  6'    Wall sits 2x10 3" 2x10 3" 2x10 3" 2x10 3" 2x10 3"  1x10 3" 2x10 3" 2x10 3"                 Ther Activity             Side step ups 3x10 8"  3x10 8" 3x10 8" 3x10 8" 3x10 8"   2x10 8" 2x10 8" 3x10 8"   Step downs 2x10 6" 2x10 6" 2x10 6" 2x10 6" 2x10 6"    1x5 4" 2x10 6"   TRX squats 2x10 3x10 3x10 3x10 3x10  3x10 3x10 3x10 2x10   Step ups       2x10 8" 2x10 8" 2x10 8" 3x10 8"   STS from 20" box 2x10 20# 3x10 20# 3x10 20# 3x10 20# 3x10 20#  3x10 3x10 10# 3x10 10# 3x10 10#                Gait Training                                       Modalities

## 2022-07-11 NOTE — TELEPHONE ENCOUNTER
Can you add on M79 18 (myofascial pain syndrome)? Would that work to get this approved? If not, can you ask yLnne Garber if there is there a cream that her insurance will pay for? Pt arrived from an independent living facility due to caretaker noticing pt drooping/stooping over to the right side and unable to speak this morning. Pt LKWT is 8pm on the 3rd of October.     Per EMS pt baseline is AOx3, able to ambulate independently. Upon arrival to ED pt is AOx1 and is unable to speak in full sentences.

## 2022-07-13 ENCOUNTER — OFFICE VISIT (OUTPATIENT)
Dept: PHYSICAL THERAPY | Facility: CLINIC | Age: 57
End: 2022-07-13
Payer: COMMERCIAL

## 2022-07-13 DIAGNOSIS — S72.492D OTHER CLOSED FRACTURE OF DISTAL END OF LEFT FEMUR WITH ROUTINE HEALING, SUBSEQUENT ENCOUNTER: Primary | ICD-10-CM

## 2022-07-13 PROCEDURE — 97112 NEUROMUSCULAR REEDUCATION: CPT | Performed by: PHYSICAL THERAPIST

## 2022-07-13 PROCEDURE — 97530 THERAPEUTIC ACTIVITIES: CPT | Performed by: PHYSICAL THERAPIST

## 2022-07-13 PROCEDURE — 97110 THERAPEUTIC EXERCISES: CPT | Performed by: PHYSICAL THERAPIST

## 2022-07-13 NOTE — PROGRESS NOTES
Daily Note     Today's date: 2022  Patient name: Rich Felix  : 1965  MRN: [id number]  Referring provider: Rashad Mays MD  Dx:   Encounter Diagnosis     ICD-10-CM    1  Other closed fracture of distal end of left femur with routine healing, subsequent encounter  [id number] [id number]                   Subjective: Pt reports having increased pain in her L knee after her last therapy session, however, pain decreased with medication  Objective: See treatment diary below      Assessment: Tolerated treatment well  Patient demonstrated fatigue post treatment, exhibited good technique with therapeutic exercises and would benefit from continued PT  Pt was able to progress today by increasing resistance for her side stepping exercise  Pt continues to have limitations in L knee flexion and slight pain during posterior knee mobilizations  Pt required min verbal cues to facilitate performance of proper technique  Assess pt response to treatment at next visit  Plan: Continue per plan of care  Precautions: s/p ORIF left distal femur DOS 3/21/22 , Hx L patella replacement 2016  Per ortho visit on 5/3/22: ROM exercises, may progress to strengthening  WBAT LLE        Access Code: [facility]     Manuals    L knee PROM/mobs supine 7' 7' 8' 5' 8' 8'   7' 7'   Seated L knee flexion 5' 5' 5' 3' 5' 5'   5' 5'   Supine knee ext stretch 1x6 10" 1x6 10" 1x6 10" 1x6 10" 10"x5 10"x5   1x5 10" 1x6 10"   Hamstring stretch             Neuro Re-Ed             Quad set with towel under ankle             Quad set with LAQ 3x10 4#        3x10 4# 3x10 4#   Quad set with SLR 3x10 3# 3x10 15# 3x12 3# 3x12 3# 3x10 4# 3x10 4#   3x10 3# 3x10 3#   Pt education  4' 4' 4' 4' 4'       Sidestepping c band   4x 48' grn 4x 48' grn 4x 50' grn 3x 50' blue                                 Ther Ex             Heel slides 3x10 5" 2x10 5" 2x10 5" 2x10 5" 2x10 5" 3x10 5"   2x10 5" 2x10 5"   Standing knee flexion     2x10 5# 3x10 5#       Sidelying SLR             SL heel raises 3x10 3x10  3x10 3x10 5# 3x10 5#   2x10 2x10   Prone quad stretch 1x10 10"  1x10 10" 1x10 10" 1x10 10" 1x10 10"   1x10 10" 1x10 10"   Pt education             Upright bike 6'  6'  6'  6' 7' 7'   6'  6'    Wall sits 2x10 3" 2x10 3" 2x10 3" 2x10 3" 2x10 3" 2x10 3"   2x10 3"                 Ther Activity             Side step ups 3x10 8"  3x10 8" 3x10 8" 3x10 8" 3x10 8" 3x10 8"   2x10 8" 3x10 8"   Step downs 2x10 6" 2x10 6" 2x10 6" 2x10 6" 2x10 6" 2x10 6"   1x5 4" 2x10 6"   TRX squats 2x10 3x10 3x10 3x10 3x10 3x10   3x10 2x10   Step ups         2x10 8" 3x10 8"   STS from 20" box 2x10 20# 3x10 20# 3x10 20# 3x10 20# 3x10 20# 3x10 20#   3x10 10# 3x10 10#                Gait Training                                       Modalities

## 2022-07-19 ENCOUNTER — OFFICE VISIT (OUTPATIENT)
Dept: PHYSICAL THERAPY | Facility: CLINIC | Age: 57
End: 2022-07-19
Payer: COMMERCIAL

## 2022-07-19 DIAGNOSIS — S72.492D OTHER CLOSED FRACTURE OF DISTAL END OF LEFT FEMUR WITH ROUTINE HEALING, SUBSEQUENT ENCOUNTER: Primary | ICD-10-CM

## 2022-07-19 PROCEDURE — 97140 MANUAL THERAPY 1/> REGIONS: CPT | Performed by: PHYSICAL THERAPIST

## 2022-07-19 PROCEDURE — 97112 NEUROMUSCULAR REEDUCATION: CPT | Performed by: PHYSICAL THERAPIST

## 2022-07-19 PROCEDURE — 97530 THERAPEUTIC ACTIVITIES: CPT | Performed by: PHYSICAL THERAPIST

## 2022-07-19 PROCEDURE — 97110 THERAPEUTIC EXERCISES: CPT | Performed by: PHYSICAL THERAPIST

## 2022-07-19 NOTE — PROGRESS NOTES
Daily Note     Today's date: 2022  Patient name: Rich Felix  : 1965  MRN: [id number]  Referring provider: Rashad Mays MD  Dx:   Encounter Diagnosis     ICD-10-CM    1  Other closed fracture of distal end of left femur with routine healing, subsequent encounter  S72 492D        Start Time: 1105  Stop Time: 1150  Total time in clinic (min): 45 minutes    Subjective: Pt reports she was walking on the beach on her vacation and had a difficult time  Pt reports she continues to have difficulty with bending of her L knee  Objective: See treatment diary below      Assessment: Tolerated treatment well  Patient demonstrated fatigue post treatment, exhibited good technique with therapeutic exercises and would benefit from continued PT  Pt was able to progress today with inclusion of leg press and SL leg press into pt's exercise program  Pt able to attain approximately 110 degrees of L knee flexion at today's session  Pt required min verbal cues to facilitate performance of proper technique  Assess pt response to treatment at next visit  Plan: Continue per plan of care  Precautions: s/p ORIF left distal femur DOS 3/21/22 , Hx L patella replacement 2016  Per ortho visit on 5/3/22: ROM exercises, may progress to strengthening  WBAT LLE        Access Code: [facility]     Manuals    L knee PROM/mobs supine 7' 7' 8' 5' 8' 8' 8'  7' 7'   Seated L knee flexion 5' 5' 5' 3' 5' 5' 5'  5' 5'   Supine knee ext stretch 1x6 10" 1x6 10" 1x6 10" 1x6 10" 10"x5 10"x5 10"x5  1x5 10" 1x6 10"   Hamstring stretch             Neuro Re-Ed             Quad set with towel under ankle             Quad set with LAQ 3x10 4#        3x10 4# 3x10 4#   Quad set with SLR 3x10 3# 3x10 15# 3x12 3# 3x12 3# 3x10 4# 3x10 4# 3x10 4#  3x10 3# 3x10 3#   Pt education  4' 4' 4' 4' 4' 4'      Sidestepping c band   4x 48' grn 4x 48' grn 4x 48' grn 3x 50' blue 3x 50' blue Ther Ex             Heel slides 3x10 5" 2x10 5" 2x10 5" 2x10 5" 2x10 5" 3x10 5"   2x10 5" 2x10 5"   Standing knee flexion     2x10 5# 3x10 5# 3x1- 5#      Sidelying SLR             SL heel raises 3x10 3x10  3x10 3x10 5# 3x10 5# 3x10 5#  2x10 2x10   Prone quad stretch 1x10 10"  1x10 10" 1x10 10" 1x10 10" 1x10 10" 1x10 10"  1x10 10" 1x10 10"   Pt education             Upright bike 6'  6'  6'  6' 7' 7' 7'  6'  6'    Wall sits 2x10 3" 2x10 3" 2x10 3" 2x10 3" 2x10 3" 2x10 3" 2x10 3"  2x10 3"    Leg press       2x10 105#      SL leg press       2x10 55#                   Ther Activity             Side step ups 3x10 8"  3x10 8" 3x10 8" 3x10 8" 3x10 8" 3x10 8" 3x10 8"  2x10 8" 3x10 8"   Step downs 2x10 6" 2x10 6" 2x10 6" 2x10 6" 2x10 6" 2x10 6" 2x10 6"  1x5 4" 2x10 6"   Squats at bar 2x10 3x10 3x10 3x10 3x10 3x10 3x10  3x10 2x10   Step ups         2x10 8" 3x10 8"   STS from 20" box 2x10 20# 3x10 20# 3x10 20# 3x10 20# 3x10 20# 3x10 20# 3x10 20#  3x10 10# 3x10 10#                Gait Training                                       Modalities

## 2022-07-21 ENCOUNTER — OFFICE VISIT (OUTPATIENT)
Dept: PHYSICAL THERAPY | Facility: CLINIC | Age: 57
End: 2022-07-21
Payer: COMMERCIAL

## 2022-07-21 DIAGNOSIS — S72.492D OTHER CLOSED FRACTURE OF DISTAL END OF LEFT FEMUR WITH ROUTINE HEALING, SUBSEQUENT ENCOUNTER: Primary | ICD-10-CM

## 2022-07-21 PROCEDURE — 97112 NEUROMUSCULAR REEDUCATION: CPT | Performed by: PHYSICAL THERAPIST

## 2022-07-21 PROCEDURE — 97530 THERAPEUTIC ACTIVITIES: CPT | Performed by: PHYSICAL THERAPIST

## 2022-07-21 PROCEDURE — 97110 THERAPEUTIC EXERCISES: CPT | Performed by: PHYSICAL THERAPIST

## 2022-07-21 PROCEDURE — 97140 MANUAL THERAPY 1/> REGIONS: CPT | Performed by: PHYSICAL THERAPIST

## 2022-07-21 NOTE — PROGRESS NOTES
Daily Note     Today's date: 2022  Patient name: Donna Hernandez  : 1965  MRN: [id number]  Referring provider: Pola Aldrich MD  Dx:   Encounter Diagnosis     ICD-10-CM    1  Other closed fracture of distal end of left femur with routine healing, subsequent encounter  S72 492D        Start Time: 1100  Stop Time: 1145  Total time in clinic (min): 45 minutes    Subjective: Pt reports she continues to have pain and difficulty with stair negotiation as well as with bending of her L knee  Objective: See treatment diary below      Assessment: Tolerated treatment well  Patient demonstrated fatigue post treatment, exhibited good technique with therapeutic exercises and would benefit from continued PT  Pt continues to demonstrate limited AROM and PROM of L knee flexion at today's session with a painful end feel  Pt able to complete all of her LE strengthening exercises with a slight increase in L knee pain  Pt required min verbal cues to facilitate performance of proper technique  Assess pt response to treatment at next visit  Plan: Continue per plan of care  Precautions: s/p ORIF left distal femur DOS 3/21/22 , Hx L patella replacement 2016  Per ortho visit on 5/3/22: ROM exercises, may progress to strengthening  WBAT LLE        Access Code: [facility]     Manuals      L knee PROM/mobs supine 7' 7' 8' 5' 8' 8' 8' 5'     Seated L knee flexion 5' 5' 5' 3' 5' 5' 5' 5'     Supine knee ext stretch 1x6 10" 1x6 10" 1x6 10" 1x6 10" 10"x5 10"x5 10"x5      Hamstring stretch             Neuro Re-Ed             Quad set with towel under ankle             Quad set with LAQ 3x10 4#            Quad set with SLR 3x10 3# 3x10 15# 3x12 3# 3x12 3# 3x10 4# 3x10 4# 3x10 4# 3x10 4#     Pt education  4' 4' 4' 4' 4' 4' 4'     Sidestepping c band   4x 48' grn 4x 48' grn 4x 48' grn 3x 50' blue 3x 50' blue 3x 50' blue                               Ther Ex             Heel slides 3x10 5" 2x10 5" 2x10 5" 2x10 5" 2x10 5" 3x10 5"       Standing knee flexion     2x10 5# 3x10 5# 3x1- 5# 3x10 5#     Sidelying SLR             SL heel raises 3x10 3x10  3x10 3x10 5# 3x10 5# 3x10 5# 3x10 5#     Prone quad stretch 1x10 10"  1x10 10" 1x10 10" 1x10 10" 1x10 10" 1x10 10" 1x10 10"     Pt education             Upright bike 6'  6'  6'  6' 7' 7' 7' 7'     Wall sits 2x10 3" 2x10 3" 2x10 3" 2x10 3" 2x10 3" 2x10 3" 2x10 3" 2x10 3"     Leg press       2x10 105# 2x10 105#     SL leg press       2x10 55# 2x10 55#                  Ther Activity             Side step ups 3x10 8"  3x10 8" 3x10 8" 3x10 8" 3x10 8" 3x10 8" 3x10 8" 3x10 8"     Step downs 2x10 6" 2x10 6" 2x10 6" 2x10 6" 2x10 6" 2x10 6" 2x10 6" 2x10 6"     Squats at bar 2x10 3x10 3x10 3x10 3x10 3x10 3x10 3x10     Step ups             STS from 20" box 2x10 20# 3x10 20# 3x10 20# 3x10 20# 3x10 20# 3x10 20# 3x10 20# 3x10 20#                  Gait Training                                       Modalities

## 2022-07-25 ENCOUNTER — OFFICE VISIT (OUTPATIENT)
Dept: PHYSICAL THERAPY | Facility: CLINIC | Age: 57
End: 2022-07-25
Payer: COMMERCIAL

## 2022-07-25 DIAGNOSIS — S72.492D OTHER CLOSED FRACTURE OF DISTAL END OF LEFT FEMUR WITH ROUTINE HEALING, SUBSEQUENT ENCOUNTER: Primary | ICD-10-CM

## 2022-07-25 PROCEDURE — 97140 MANUAL THERAPY 1/> REGIONS: CPT | Performed by: PHYSICAL THERAPIST

## 2022-07-25 PROCEDURE — 97110 THERAPEUTIC EXERCISES: CPT | Performed by: PHYSICAL THERAPIST

## 2022-07-25 PROCEDURE — 97112 NEUROMUSCULAR REEDUCATION: CPT | Performed by: PHYSICAL THERAPIST

## 2022-07-25 PROCEDURE — 97530 THERAPEUTIC ACTIVITIES: CPT | Performed by: PHYSICAL THERAPIST

## 2022-07-25 NOTE — PROGRESS NOTES
Daily Note     Today's date: 2022  Patient name: Rich Felix  : 1965  MRN: [id number]  Referring provider: Rashad Mays MD  Dx:   Encounter Diagnosis     ICD-10-CM    1  Other closed fracture of distal end of left femur with routine healing, subsequent encounter  S72 492D        Start Time: 1005  Stop Time: 1100  Total time in clinic (min): 55 minutes    Subjective: Pt reports she is having increased stiffness in her L knee at the start of therapy today  Objective: See treatment diary below      Assessment: Tolerated treatment well  Patient demonstrated fatigue post treatment, exhibited good technique with therapeutic exercises and would benefit from continued PT  Pt was able to progress by increasing resistance used for her leg press exercise at today's session  Initiated knee flexion stretch on step to further improve L knee flexion, pt able to complete with minimal pain  Pt required min verbal cues to facilitate performance of proper technique  Assess pt response to treatment at next visit  Plan: Continue per plan of care  Progress treatment as tolerated  Precautions: s/p ORIF left distal femur DOS 3/21/22 , Hx L patella replacement 2016  Per ortho visit on 5/3/22: ROM exercises, may progress to strengthening  WBAT LLE        Access Code: [facility]     Manuals     L knee PROM/mobs supine 7' 7' 8' 5' 8' 8' 8' 5' 8'    Seated L knee flexion 5' 5' 5' 3' 5' 5' 5' 5'     Supine knee ext stretch 1x6 10" 1x6 10" 1x6 10" 1x6 10" 10"x5 10"x5 10"x5      Hamstring stretch             Neuro Re-Ed             Quad set with towel under ankle             Quad set with LAQ 3x10 4#            Quad set with SLR 3x10 3# 3x10 15# 3x12 3# 3x12 3# 3x10 4# 3x10 4# 3x10 4# 3x10 4# 3x10 4#    Pt education  4' 4' 4' 4' 4' 4' 4' 4'    Sidestepping c band   4x 48' grn 4x 48' grn 4x 48' grn 3x 50' blue 3x 50' blue 3x 50' blue 3x 50' blue Ther Ex             Heel slides 3x10 5" 2x10 5" 2x10 5" 2x10 5" 2x10 5" 3x10 5"       Standing knee flexion     2x10 5# 3x10 5# 3x1- 5# 3x10 5# 3x10 5#    Sidelying SLR             SL heel raises 3x10 3x10  3x10 3x10 5# 3x10 5# 3x10 5# 3x10 5# 3x10 5#    Prone quad stretch 1x10 10"  1x10 10" 1x10 10" 1x10 10" 1x10 10" 1x10 10" 1x10 10" 1x10 10"    Pt education             Upright bike 6'  6'  6'  6' 7' 7' 7' 7' 7'    Wall sits 2x10 3" 2x10 3" 2x10 3" 2x10 3" 2x10 3" 2x10 3" 2x10 3" 2x10 3" 2x10 3"    Leg press       2x10 105# 2x10 105# 2x10 115#    SL leg press       2x10 55# 2x10 55# 2x10 55#    Knee flexion stretch on step         1x10 10"    Ther Activity             Side step ups 3x10 8"  3x10 8" 3x10 8" 3x10 8" 3x10 8" 3x10 8" 3x10 8" 3x10 8" 3x10 8" c airex    Step downs 2x10 6" 2x10 6" 2x10 6" 2x10 6" 2x10 6" 2x10 6" 2x10 6" 2x10 6"     Squats at bar 2x10 3x10 3x10 3x10 3x10 3x10 3x10 3x10     Step ups             STS from 20" box 2x10 20# 3x10 20# 3x10 20# 3x10 20# 3x10 20# 3x10 20# 3x10 20# 3x10 20#                  Gait Training                                       Modalities

## 2022-07-26 ENCOUNTER — OFFICE VISIT (OUTPATIENT)
Dept: OBGYN CLINIC | Facility: CLINIC | Age: 57
End: 2022-07-26
Payer: COMMERCIAL

## 2022-07-26 VITALS
SYSTOLIC BLOOD PRESSURE: 145 MMHG | HEART RATE: 80 BPM | HEIGHT: 64 IN | DIASTOLIC BLOOD PRESSURE: 80 MMHG | WEIGHT: 137 LBS | BODY MASS INDEX: 23.39 KG/M2

## 2022-07-26 DIAGNOSIS — Z87.81 S/P ORIF (OPEN REDUCTION INTERNAL FIXATION) FRACTURE: Primary | ICD-10-CM

## 2022-07-26 DIAGNOSIS — Z98.890 S/P ORIF (OPEN REDUCTION INTERNAL FIXATION) FRACTURE: Primary | ICD-10-CM

## 2022-07-26 PROCEDURE — 99213 OFFICE O/P EST LOW 20 MIN: CPT | Performed by: PHYSICIAN ASSISTANT

## 2022-07-26 NOTE — PROGRESS NOTES
Orthopaedics Office Visit - Follow up Patient Visit    ASSESSMENT/PLAN:    Assessment:   Aprox  4 months s/p ORIF left distal femur Type B fracture, DOS 3/21/22  Continued pain and stiffness  Hypersensitivity over scar  Mild left knee osteoarthritis      Preop extensor lag present from patellofemoral arthroplasty  Patient denies existence this pre-op however has mentioned it repeatedly in previous visits      Plan:   · Advised to continue PT   · She is having tenderness over the medial joint line   · We did discuss a left knee CSI but she does have a patella replacement which would increase her infection rate, will hold off on the CSI at this time  · May benefit from Voltaren Gel OTC  · Briefly discussed a MRI if pain persists or fails to improve   · Follow up in 4 weeks time       To Do Next Visit:  Re-evaluation     _____________________________________________________  CHIEF COMPLAINT:  Chief Complaint   Patient presents with    Left Knee - Follow-up         SUBJECTIVE:  Maria Esther Ackerman is a 61 y o  female who presents to the office Aprox  4 months s/p ORIF left distal femur Type B fracture, DOS 3/21/22  She feel knee flexion is still limited  She notes diffuse knee pain, which will worsen with ambulation  She is attending formal PT for ROM and strengthening exercises  She is taking Aleve for pain control  She was able to see Dr Cyndi Silva who prescribed a cream to help with pain but notes insurance denied this        PAST MEDICAL HISTORY:  Past Medical History:   Diagnosis Date    Arthritis     Migraines        PAST SURGICAL HISTORY:  Past Surgical History:   Procedure Laterality Date    CHOLECYSTECTOMY      GALLBLADDER SURGERY      JOINT REPLACEMENT      PATELLA SURGERY  2016    Patella replacement on left knee    AL OPEN TX FEMORAL FRACTURE DISTAL MED/LAT CONDYLE Left 3/21/2022    Procedure: OPEN REDUCTION W/ INTERNAL FIXATION (ORIF) LEFT DISTAL FEMUR FRACTURE;  Surgeon: Alannah Lipscomb MD;  Location: [facility];  Service: Orthopedics    REPLACEMENT TOTAL KNEE Left     TONSILECTOMY AND ADNOIDECTOMY  1971    Tonsilectomy only    TONSILLECTOMY      TUBAL LIGATION  1989       FAMILY HISTORY:  Family History   Problem Relation Age of Onset    Dementia Mother     Alzheimer's disease Mother     Kidney disease Father     Hypertension Father     No Known Problems Sister     Hypertension Sister     Hyperlipidemia Sister     No Known Problems Brother     Heart attack Maternal Grandmother     Parkinsonism Maternal Grandfather     No Known Problems Paternal Grandmother     No Known Problems Paternal Grandfather     No Known Problems Brother     Scoliosis Son     Scoliosis Son     Anxiety disorder Son     Depression Son        SOCIAL HISTORY:  Social History     Tobacco Use    Smoking status: Never Smoker    Smokeless tobacco: Never Used   Vaping Use    Vaping Use: Never used   Substance Use Topics    Alcohol use: Never     Alcohol/week: 0 0 standard drinks    Drug use: Never       MEDICATIONS:    Current Outpatient Medications:     Ascorbic Acid (VITAMIN C ADULT GUMMIES PO), Take 500 mg by mouth daily, Disp: , Rfl:     cholecalciferol (VITAMIN D3) 400 units tablet, Take 400 Units by mouth daily, Disp: , Rfl:     Multiple Vitamins-Minerals (MULTIVITAMIN GUMMIES ADULT PO), Take by mouth daily, Disp: , Rfl:     naproxen sodium (ALEVE) 220 MG tablet, Take 220 mg by mouth once as needed , Disp: , Rfl:     vitamin E, tocopherol, 1,000 units capsule, Take 1,000 Units by mouth daily, Disp: , Rfl:     aspirin (ECOTRIN LOW STRENGTH) 81 mg EC tablet, Take 1 tablet (81 mg total) by mouth every 12 (twelve) hours, Disp: 84 tablet, Rfl: 0    gabapentin (Neurontin) 300 mg capsule, Take 1 capsule (300 mg total) by mouth daily at bedtime (Patient not taking: No sig reported), Disp: 30 capsule, Rfl: 1    oxyCODONE (Roxicodone) 5 immediate release tablet, Take 1 tablet (5 mg total) by mouth every 4 (four) hours as needed for moderate pain Max Daily Amount: 30 mg (Patient not taking: No sig reported), Disp: 30 tablet, Rfl: 0    oxyCODONE (Roxicodone) 5 immediate release tablet, Take 1 tablet (5 mg total) by mouth every 6 (six) hours as needed for moderate pain Max Daily Amount: 20 mg (Patient not taking: No sig reported), Disp: 28 tablet, Rfl: 0    rizatriptan (MAXALT) 5 MG tablet, Take 5 mg by mouth once as needed for migraine May repeat in 2 hours if needed (Patient not taking: No sig reported), Disp: , Rfl:     topiramate (TOPAMAX) 25 mg tablet, Take 1 tablet (25 mg total) by mouth 2 (two) times a day (Patient not taking: No sig reported), Disp: 60 tablet, Rfl: 4    ALLERGIES:  No Known Allergies    REVIEW OF SYSTEMS:  MSK: as noted in HPI  Neuro: WNL  Pertinent items are otherwise noted in HPI  A comprehensive review of systems was otherwise negative  LABS:  HgA1c: No results found for: HGBA1C  BMP:   Lab Results   Component Value Date    CALCIUM 8 4 03/22/2022    K 3 9 03/22/2022    CO2 30 03/22/2022     03/22/2022    BUN 14 03/22/2022    CREATININE 0 70 03/22/2022     CBC: No components found for: CBC    _____________________________________________________  PHYSICAL EXAMINATION:  Vital signs: /80   Pulse 80   Ht 5' 4" (1 626 m)   Wt 62 1 kg (137 lb)   BMI 23 52 kg/m²   General: No acute distress, awake and alert  Psychiatric: Mood and affect appear appropriate  HEENT: Trachea Midline, No torticollis, no apparent facial trauma  Cardiovascular: No audible murmurs;  Extremities appear perfused  Pulmonary: No audible wheezing or stridor  Skin: No open lesions; see further details (if any) below      MUSCULOSKELETAL EXAMINATION:  Extremities:  Left knee:   No erythema, ecchymosis or edema  Well healed surgical incision   Non tender to palpation along the plate   Medial joint line tenderness   Near full extension   Knee flexion to aprox, 100 degrees   Extremity appears warm and well perfused Ambulates without assistance     _____________________________________________________  STUDIES REVIEWED:  I personally reviewed the images and interpretation is as follows: No new imaging to review       PROCEDURES PERFORMED:  No procedures were performed at this time

## 2022-07-28 ENCOUNTER — OFFICE VISIT (OUTPATIENT)
Dept: PHYSICAL THERAPY | Facility: CLINIC | Age: 57
End: 2022-07-28
Payer: COMMERCIAL

## 2022-07-28 DIAGNOSIS — S72.492D OTHER CLOSED FRACTURE OF DISTAL END OF LEFT FEMUR WITH ROUTINE HEALING, SUBSEQUENT ENCOUNTER: Primary | ICD-10-CM

## 2022-07-28 PROCEDURE — 97110 THERAPEUTIC EXERCISES: CPT | Performed by: PHYSICAL THERAPIST

## 2022-07-28 PROCEDURE — 97530 THERAPEUTIC ACTIVITIES: CPT | Performed by: PHYSICAL THERAPIST

## 2022-07-28 PROCEDURE — 97140 MANUAL THERAPY 1/> REGIONS: CPT | Performed by: PHYSICAL THERAPIST

## 2022-07-28 NOTE — PROGRESS NOTES
Daily Note     Today's date: 2022  Patient name: Yevgeniy Perez  : 1965  MRN: [id number]  Referring provider: Faisal Ga MD  Dx:   Encounter Diagnosis     ICD-10-CM    1  Other closed fracture of distal end of left femur with routine healing, subsequent encounter  S72 492D        Start Time: 1015  Stop Time: 1102  Total time in clinic (min): 47 minutes    Subjective: Pt reports she saw the surgeon yesterday and discussed the increased pain she has been having  Pt reports she continues to have a significant restrictions in motion  Objective: See treatment diary below      Assessment: Tolerated treatment well  Patient demonstrated fatigue post treatment, exhibited good technique with therapeutic exercises and would benefit from continued PT  Pt was able to progress today with inclusion of split squats into pt's exercise program  Pt required min verbal cues to facilitate performance of proper technique  Assess pt response to treatment at next visit  Plan: Continue per plan of care  Precautions: s/p ORIF left distal femur DOS 3/21/22 , Hx L patella replacement 2016  Per ortho visit on 5/3/22: ROM exercises, may progress to strengthening  WBAT LLE        Access Code: [facility]     Manuals    L knee PROM/mobs supine 7' 7' 8' 5' 8' 8' 8' 5' 8' 8'   Seated L knee flexion 5' 5' 5' 3' 5' 5' 5' 5'     Supine knee ext stretch 1x6 10" 1x6 10" 1x6 10" 1x6 10" 10"x5 10"x5 10"x5      Hamstring stretch             Neuro Re-Ed             Quad set with towel under ankle             Quad set with LAQ 3x10 4#            Quad set with SLR 3x10 3# 3x10 15# 3x12 3# 3x12 3# 3x10 4# 3x10 4# 3x10 4# 3x10 4# 3x10 4#    Pt education  4' 4' 4' 4' 4' 4' 4' 4' 4'   Sidestepping c band   4x 48' grn 4x 48' grn 4x 48' grn 3x 50' blue 3x 50' blue 3x 50' blue 3x 50' blue                              Ther Ex             Heel slides 3x10 5" 2x10 5" 2x10 5" 2x10 5" 2x10 5" 3x10 5"       Standing knee flexion     2x10 5# 3x10 5# 3x1- 5# 3x10 5# 3x10 5# 3x10 5#   Sidelying SLR             SL heel raises 3x10 3x10  3x10 3x10 5# 3x10 5# 3x10 5# 3x10 5# 3x10 5# 3x10 5#   Prone quad stretch 1x10 10"  1x10 10" 1x10 10" 1x10 10" 1x10 10" 1x10 10" 1x10 10" 1x10 10" 1x10 10"   Pt education             Upright bike 6'  6'  6'  6' 7' 7' 7' 7' 7' 7'   Wall sits 2x10 3" 2x10 3" 2x10 3" 2x10 3" 2x10 3" 2x10 3" 2x10 3" 2x10 3" 2x10 3" 2x10 3"   Leg press       2x10 105# 2x10 105# 2x10 115# 2x10 115#   SL leg press       2x10 55# 2x10 55# 2x10 55# 2x10 55#   Knee flexion stretch on step         1x10 10" 1x10 10"   Ther Activity             Side step ups 3x10 8"  3x10 8" 3x10 8" 3x10 8" 3x10 8" 3x10 8" 3x10 8" 3x10 8" 3x10 8" c airex 3x10 8" c airex   Step downs 2x10 6" 2x10 6" 2x10 6" 2x10 6" 2x10 6" 2x10 6" 2x10 6" 2x10 6"  2x10 6"   Squats at bar 2x10 3x10 3x10 3x10 3x10 3x10 3x10 3x10     Step ups             STS from 20" box 2x10 20# 3x10 20# 3x10 20# 3x10 20# 3x10 20# 3x10 20# 3x10 20# 3x10 20#     Split squats          2x10   Gait Training                                       Modalities

## 2022-08-01 ENCOUNTER — OFFICE VISIT (OUTPATIENT)
Dept: PHYSICAL THERAPY | Facility: CLINIC | Age: 57
End: 2022-08-01
Payer: COMMERCIAL

## 2022-08-01 DIAGNOSIS — S72.492D OTHER CLOSED FRACTURE OF DISTAL END OF LEFT FEMUR WITH ROUTINE HEALING, SUBSEQUENT ENCOUNTER: Primary | ICD-10-CM

## 2022-08-01 PROCEDURE — 97530 THERAPEUTIC ACTIVITIES: CPT | Performed by: PHYSICAL THERAPIST

## 2022-08-01 PROCEDURE — 97140 MANUAL THERAPY 1/> REGIONS: CPT | Performed by: PHYSICAL THERAPIST

## 2022-08-01 PROCEDURE — 97110 THERAPEUTIC EXERCISES: CPT | Performed by: PHYSICAL THERAPIST

## 2022-08-01 NOTE — PROGRESS NOTES
Daily Note     Today's date: 2022  Patient name: Padmini Sood  : 1965  MRN: [id number]  Referring provider: Alejandro Esquivel MD  Dx:   Encounter Diagnosis     ICD-10-CM    1  Other closed fracture of distal end of left femur with routine healing, subsequent encounter  S72 492D        Start Time: 1014  Stop Time: 1101  Total time in clinic (min): 47 minutes    Subjective: Pt reports she continues to have pain and stiffness in her L knee, however, she is functioning better around her home  Objective: See treatment diary below      Assessment: Tolerated treatment well  Patient demonstrated fatigue post treatment, exhibited good technique with therapeutic exercises and would benefit from continued PT  Pt was able to progress today by increasing sets for her leg press exercises as well as performing squats with no UE support  Pt able to achieve 108 degrees of L knee flexion in supine at today's session  Pt required min verbal cues to facilitate performance of proper technique  Assess pt response to treatment at next visit  Plan: Continue per plan of care  Precautions: s/p ORIF left distal femur DOS 3/21/22 , Hx L patella replacement 2016  Per ortho visit on 5/3/22: ROM exercises, may progress to strengthening  WBAT LLE        Access Code: [facility]     Manuals    L knee PROM/mobs supine 4'     8' 8' 5' 8' 8'   Seated L knee flexion 4'     5' 5' 5'     Supine knee ext stretch      10"x5 10"x5      Hamstring stretch             Neuro Re-Ed             Quad set with towel under ankle             Quad set with LAQ             Quad set with SLR      3x10 4# 3x10 4# 3x10 4# 3x10 4#    Pt education 2'     4' 4' 4' 4' 4'   Sidestepping c band 3x 50' blue     3x 50' blue 3x 50' blue 3x 50' blue 3x 50' blue                              Ther Ex             Standing knee flexion      3x10 5# 3x1- 5# 3x10 5# 3x10 5# 3x10 5#   SL heel raises      3x10 5# 3x10 5# 3x10 5# 3x10 5# 3x10 5#   Prone quad stretch 1x10 10"     1x10 10" 1x10 10" 1x10 10" 1x10 10" 1x10 10"   Pt education             Upright bike 7'      7' 7' 7' 7' 7'   Wall sits 2x10 3"     2x10 3" 2x10 3" 2x10 3" 2x10 3" 2x10 3"   Leg press 3x10 115#      2x10 105# 2x10 105# 2x10 115# 2x10 115#   SL leg press 3x10 55#      2x10 55# 2x10 55# 2x10 55# 2x10 55#   Knee flexion stretch on step 2x10 10"        1x10 10" 1x10 10"   Ther Activity             Side step ups 3x10 8" c airex     3x10 8" 3x10 8" 3x10 8" 3x10 8" c airex 3x10 8" c airex   Step downs 2x10 6"     2x10 6" 2x10 6" 2x10 6"  2x10 6"   BW squats 2x10     3x10 3x10 3x10     Step ups             STS from 20" box      3x10 20# 3x10 20# 3x10 20#     Split squats 2x10         2x10   Gait Training                                       Modalities

## 2022-08-04 ENCOUNTER — OFFICE VISIT (OUTPATIENT)
Dept: PHYSICAL THERAPY | Facility: CLINIC | Age: 57
End: 2022-08-04
Payer: COMMERCIAL

## 2022-08-04 DIAGNOSIS — S72.492D OTHER CLOSED FRACTURE OF DISTAL END OF LEFT FEMUR WITH ROUTINE HEALING, SUBSEQUENT ENCOUNTER: Primary | ICD-10-CM

## 2022-08-04 PROCEDURE — 97530 THERAPEUTIC ACTIVITIES: CPT | Performed by: PHYSICAL THERAPIST

## 2022-08-04 PROCEDURE — 97110 THERAPEUTIC EXERCISES: CPT | Performed by: PHYSICAL THERAPIST

## 2022-08-04 PROCEDURE — 97112 NEUROMUSCULAR REEDUCATION: CPT | Performed by: PHYSICAL THERAPIST

## 2022-08-04 NOTE — PROGRESS NOTES
Pain Medicine Follow-Up Note    Assessment:  1  Closed fracture of distal end of left femur, unspecified fracture morphology, initial encounter [facility]        Plan:  No orders of the defined types were placed in this encounter  No orders of the defined types were placed in this encounter  My impressions and treatment recommendations were discussed in detail with the patient who verbalized understanding and had no further questions  ***    {PDMP Statement:65863}    {UDS Statement:93783}    {Opioid Statement:78701}    {Pain Management Procedure Risk Statement:58473}    {Oral Swab Statement:02981}    Follow-up is planned in *** time or sooner as warranted  Discharge instructions were provided  I personally saw and examined the patient and I agree with the above discussed plan of care  History of Present Illness:    Alden Cabrera is a 61 y o  female who presents to [facility] for interval re-evaluation of the above stated pain complaints  The patient has a past medical and chronic pain history as outlined in the assessment section  {He/she (caps):74371} was last seen on ***     ***    Pain Contract Signed:  ***  Last Urine Drug Screen:  ***    Other than as stated above, the patient denies any interval changes in medications, medical condition, mental condition, symptoms, or allergies since the last office visit           Review of Systems:    Review of Systems      Patient Active Problem List   Diagnosis    Intractable migraine without aura and without status migrainosus    Closed fracture of left distal femur (HCC)    Postoperative hypoxia    Postoperative pain    Migraine    Chronic pain syndrome    Chronic pain of left knee       Past Medical History:   Diagnosis Date    Arthritis     Migraines        Past Surgical History:   Procedure Laterality Date    CHOLECYSTECTOMY      GALLBLADDER SURGERY      JOINT REPLACEMENT      PATELLA SURGERY  2016    Patella replacement on left knee    OK OPEN TX FEMORAL FRACTURE DISTAL MED/LAT CONDYLE Left 3/21/2022    Procedure: OPEN REDUCTION W/ INTERNAL FIXATION (ORIF) LEFT DISTAL FEMUR FRACTURE;  Surgeon: Lynne Espino MD;  Location: [facility];  Service: Orthopedics    REPLACEMENT TOTAL KNEE Left     TONSILECTOMY AND ADNOIDECTOMY  1971    Tonsilectomy only    TONSILLECTOMY      TUBAL LIGATION  1989       Family History   Problem Relation Age of Onset    Dementia Mother     Alzheimer's disease Mother     Kidney disease Father     Hypertension Father     No Known Problems Sister     Hypertension Sister     Hyperlipidemia Sister     No Known Problems Brother     Heart attack Maternal Grandmother     Parkinsonism Maternal Grandfather     No Known Problems Paternal Grandmother     No Known Problems Paternal Grandfather     No Known Problems Brother     Scoliosis Son     Scoliosis Son     Anxiety disorder Son     Depression Son        Social History     Occupational History    Not on file   Tobacco Use    Smoking status: Never Smoker    Smokeless tobacco: Never Used   Vaping Use    Vaping Use: Never used   Substance and Sexual Activity    Alcohol use: Never     Alcohol/week: 0 0 standard drinks    Drug use: Never    Sexual activity: Not on file         Current Outpatient Medications:     Ascorbic Acid (VITAMIN C ADULT GUMMIES PO), Take 500 mg by mouth daily, Disp: , Rfl:     aspirin (ECOTRIN LOW STRENGTH) 81 mg EC tablet, Take 1 tablet (81 mg total) by mouth every 12 (twelve) hours, Disp: 84 tablet, Rfl: 0    cholecalciferol (VITAMIN D3) 400 units tablet, Take 400 Units by mouth daily, Disp: , Rfl:     gabapentin (Neurontin) 300 mg capsule, Take 1 capsule (300 mg total) by mouth daily at bedtime (Patient not taking: No sig reported), Disp: 30 capsule, Rfl: 1    Multiple Vitamins-Minerals (MULTIVITAMIN GUMMIES ADULT PO), Take by mouth daily, Disp: , Rfl:     naproxen sodium (ALEVE) 220 MG tablet, Take 220 mg by mouth once as needed , Disp: , Rfl:     oxyCODONE (Roxicodone) 5 immediate release tablet, Take 1 tablet (5 mg total) by mouth every 4 (four) hours as needed for moderate pain Max Daily Amount: 30 mg (Patient not taking: No sig reported), Disp: 30 tablet, Rfl: 0    oxyCODONE (Roxicodone) 5 immediate release tablet, Take 1 tablet (5 mg total) by mouth every 6 (six) hours as needed for moderate pain Max Daily Amount: 20 mg (Patient not taking: No sig reported), Disp: 28 tablet, Rfl: 0    rizatriptan (MAXALT) 5 MG tablet, Take 5 mg by mouth once as needed for migraine May repeat in 2 hours if needed (Patient not taking: No sig reported), Disp: , Rfl:     topiramate (TOPAMAX) 25 mg tablet, Take 1 tablet (25 mg total) by mouth 2 (two) times a day (Patient not taking: No sig reported), Disp: 60 tablet, Rfl: 4    vitamin E, tocopherol, 1,000 units capsule, Take 1,000 Units by mouth daily, Disp: , Rfl:     No Known Allergies    Physical Exam:    There were no vitals taken for this visit  Constitutional:{General Appearance:40939::"normal, well developed, well nourished, alert, in no distress and non-toxic and no overt pain behavior  "}  Eyes:{Sclera:61839::"anicteric"}  HEENT:{Hearin::"grossly intact"}  Neck:{Neck:00382::"supple, symmetric, trachea midline and no masses "}  Pulmonary:{Respiratory effort:01700::"even and unlabored"}  Cardiovascular:{Examination of Extremities:23278::"No edema or pitting edema present"}  Skin:{Skin and Subcutaneous tissues:36125::"Normal without rashes or lesions and well hydrated"}  Psychiatric:{Mood and Affect:00993::"Mood and affect appropriate"}  Neurologic:{Cranial Nerves:13554::"Cranial Nerves II-XII grossly intact"}  Musculoskeletal:{[place]:73488::"normal"}      Imaging  DXA bone density spine hip and pelvis    (Results Pending)         No orders of the defined types were placed in this encounter

## 2022-08-04 NOTE — PROGRESS NOTES
Daily Note     Today's date: 2022  Patient name: Yevgeniy Perez  : 1965  MRN: [id number]  Referring provider: Faisal Ga MD  Dx:   Encounter Diagnosis     ICD-10-CM    1  Other closed fracture of distal end of left femur with routine healing, subsequent encounter  S72 492D        Start Time: 1015  Stop Time: 1100  Total time in clinic (min): 45 minutes    Subjective: Pt reports she was having pain going up the stairs yesterday in her L knee  Pt reports she overall is doing better with improved ability to perform household activities  Objective: See treatment diary below      Assessment: Tolerated treatment well  Patient demonstrated fatigue post treatment, exhibited good technique with therapeutic exercises and would benefit from continued PT      Plan: Continue per plan of care  Precautions: s/p ORIF left distal femur DOS 3/21/22 , Hx L patella replacement 2016  Per ortho visit on 5/3/22: ROM exercises, may progress to strengthening  WBAT LLE        Access Code: [facility]     Manuals    L knee PROM/mobs supine 4' 4'    8' 8' 5' 8' 8'   Seated L knee flexion 4'     5' 5' 5'     Supine knee ext stretch      10"x5 10"x5      Hamstring stretch             Neuro Re-Ed             Quad set with towel under ankle             Quad set with LAQ             Quad set with SLR      3x10 4# 3x10 4# 3x10 4# 3x10 4#    Pt education 2' 3'    4' 4' 4' 4' 4'   Sidestepping c band 3x 50' blue 4x 50' blue    3x 50' blue 3x 50' blue 3x 50' blue 3x 50' blue    SL ball toss c rebounder  20x red                        Ther Ex             Standing knee flexion      3x10 5# 3x1- 5# 3x10 5# 3x10 5# 3x10 5#   SL heel raises      3x10 5# 3x10 5# 3x10 5# 3x10 5# 3x10 5#   Prone quad stretch 1x10 10" 1x10 10"    1x10 10" 1x10 10" 1x10 10" 1x10 10" 1x10 10"   Pt education             Upright bike 7'  5'    7' 7' 7' 7' 7'   Wall sits 2x10 3"     2x10 3" 2x10 3" 2x10 3" 2x10 3" 2x10 3"   Leg press 3x10 115# 3x10 115#     2x10 105# 2x10 105# 2x10 115# 2x10 115#   SL leg press 3x10 55# 3x10 65#     2x10 55# 2x10 55# 2x10 55# 2x10 55#   Knee flexion stretch on step 2x10 10" 2x10 10"       1x10 10" 1x10 10"   Ther Activity             Side step ups 3x10 8" c airex 3x10 8" c airex    3x10 8" 3x10 8" 3x10 8" 3x10 8" c airex 3x10 8" c airex   Step downs 2x10 6" 2x10 6"    2x10 6" 2x10 6" 2x10 6"  2x10 6"   BW squats 2x10 2x10    3x10 3x10 3x10     Step ups             STS from 20" box      3x10 20# 3x10 20# 3x10 20#     Split squats 2x10 2x10 ea        2x10   Gait Training                                       Modalities

## 2022-08-08 ENCOUNTER — OFFICE VISIT (OUTPATIENT)
Dept: PHYSICAL THERAPY | Facility: CLINIC | Age: 57
End: 2022-08-08
Payer: COMMERCIAL

## 2022-08-08 DIAGNOSIS — S72.492D OTHER CLOSED FRACTURE OF DISTAL END OF LEFT FEMUR WITH ROUTINE HEALING, SUBSEQUENT ENCOUNTER: Primary | ICD-10-CM

## 2022-08-08 PROCEDURE — 97112 NEUROMUSCULAR REEDUCATION: CPT | Performed by: PHYSICAL THERAPIST

## 2022-08-08 PROCEDURE — 97110 THERAPEUTIC EXERCISES: CPT | Performed by: PHYSICAL THERAPIST

## 2022-08-08 PROCEDURE — 97530 THERAPEUTIC ACTIVITIES: CPT | Performed by: PHYSICAL THERAPIST

## 2022-08-08 NOTE — PROGRESS NOTES
Daily Note     Today's date: 2022  Patient name: Brandon Garcia  : 1965  MRN: [id number]  Referring provider: Karen Rodriguez MD  Dx:   Encounter Diagnosis     ICD-10-CM    1  Other closed fracture of distal end of left femur with routine healing, subsequent encounter  S78 272U                   Subjective: Pt reports she continues to have 5/10 pain in her L knee with bending and stairs as well as having a limited tolerance with walking  Objective: See treatment diary below      Assessment: Tolerated treatment well  Patient demonstrated fatigue post treatment, exhibited good technique with therapeutic exercises and would benefit from continued PT  Pt was able to progress today by increasing sets and reps for her step down and BW squat exercises  Pt continues to demonstrate a hard end feel during PROM of her L knee  Pt required min verbal cues to facilitate performance of proper technique  Assess pt response to treatment at next visit  Plan: Continue per plan of care  Precautions: s/p ORIF left distal femur DOS 3/21/22 , Hx L patella replacement 2016  Per ortho visit on 5/3/22: ROM exercises, may progress to strengthening  WBAT LLE        Access Code: [facility]     Manuals    L knee PROM/mobs supine 4' 4' 4'   8' 8' 5' 8' 8'   Seated L knee flexion 4'     5' 5' 5'     Supine knee ext stretch      10"x5 10"x5      Hamstring stretch             Neuro Re-Ed             Quad set with towel under ankle             Quad set with LAQ             Quad set with SLR      3x10 4# 3x10 4# 3x10 4# 3x10 4#    Pt education 2' 3' 3'   4' 4' 4' 4' 4'   Sidestepping c band 3x 50' blue 4x 50' blue 4x 50'   3x 50' blue 3x 50' blue 3x 50' blue 3x 50' blue    SL ball toss c rebounder  20x red 20x red                       Ther Ex             Standing knee flexion      3x10 5# 3x1- 5# 3x10 5# 3x10 5# 3x10 5#   SL heel raises      3x10 5# 3x10 5# 3x10 5# 3x10 5# 3x10 5# Prone quad stretch 1x10 10" 1x10 10" 1x10 10"   1x10 10" 1x10 10" 1x10 10" 1x10 10" 1x10 10"   Pt education             Upright bike 7'  5' 6'   7' 7' 7' 7' 7'   Wall sits 2x10 3"     2x10 3" 2x10 3" 2x10 3" 2x10 3" 2x10 3"   Leg press 3x10 115# 3x10 115# 3x10 115#    2x10 105# 2x10 105# 2x10 115# 2x10 115#   SL leg press 3x10 55# 3x10 65# 3x10 65#    2x10 55# 2x10 55# 2x10 55# 2x10 55#   Knee flexion stretch on step 2x10 10" 2x10 10" 2x10      1x10 10" 1x10 10"   Ther Activity             Side step ups 3x10 8" c airex 3x10 8" c airex    3x10 8" 3x10 8" 3x10 8" 3x10 8" c airex 3x10 8" c airex   Step downs 2x10 6" 2x10 6" 3x10 6"   2x10 6" 2x10 6" 2x10 6"  2x10 6"   BW squats 2x10 2x10 x25   3x10 3x10 3x10     Step ups             STS from 20" box      3x10 20# 3x10 20# 3x10 20#     Split squats 2x10 2x10 ea 2x10       2x10   Gait Training                                       Modalities

## 2022-08-08 NOTE — PROGRESS NOTES
Pain Medicine Follow-Up Note    Assessment:  1  Chronic pain syndrome    2  Chronic pain of left knee    3  Closed fracture of distal end of left femur, unspecified fracture morphology, sequela        Plan:  New Medications Ordered This Visit   Medications    Diclofenac Sodium (VOLTAREN) 1 %     Sig: Apply 2 g topically 4 (four) times a day as needed (pain)     Dispense:  350 g     Refill:  0     My impressions and treatment recommendations were discussed in detail with the patient who verbalized understanding and had no further questions  The patient is reporting pain primarily involving her left knee  She was unable to obtain the compounding cream that I had prescribed for her at her previous office visit because her insurance would not cover it  At this point, I do feel it reasonable to increase her gabapentin on a titration schedule to a goal dosage of gabapentin 300 mg 3 times daily  I also felt a reasonable to have her trial diclofenac 1% gel 2 g applied topically up to 4 times daily as needed for pain  Side effects were reviewed with the patient  She may be a candidate for geniculate nerve blocks in the future, but I will discuss this further with her at a future office visit  Follow-up is planned in 4 weeks time or sooner as warranted  Discharge instructions were provided  I personally saw and examined the patient and I agree with the above discussed plan of care  History of Present Illness:    Alden Cabrera is a 61 y o  female who presents to [facility] for interval re-evaluation of the above stated pain complaints  The patient has a past medical and chronic pain history as outlined in the assessment section  She was last seen on July 5, 2022  At today's office visit, the patient's pain score is 6/10 on the verbal numerical pain rating scale  The patient states that her pain is primarily involving the left knee    He describes the pain as worse in the morning  Her pain is intermittent in nature  She reports the quality of her pain as burning and sharp  She states that her symptoms are primarily involving the left knee  Other than as stated above, the patient denies any interval changes in medications, medical condition, mental condition, symptoms, or allergies since the last office visit  Review of Systems:    Review of Systems   Respiratory: Negative for shortness of breath  Cardiovascular: Negative for chest pain  Gastrointestinal: Negative for constipation, diarrhea, nausea and vomiting  Musculoskeletal: Negative for arthralgias, gait problem, joint swelling and myalgias  Skin: Negative for rash  Neurological: Negative for dizziness, seizures and weakness  All other systems reviewed and are negative          Patient Active Problem List   Diagnosis    Intractable migraine without aura and without status migrainosus    Closed fracture of left distal femur (HCC)    Postoperative hypoxia    Postoperative pain    Migraine    Chronic pain syndrome    Chronic pain of left knee       Past Medical History:   Diagnosis Date    Arthritis     Migraines        Past Surgical History:   Procedure Laterality Date    CHOLECYSTECTOMY      GALLBLADDER SURGERY      JOINT REPLACEMENT      PATELLA SURGERY  2016    Patella replacement on left knee    MI OPEN TX FEMORAL FRACTURE DISTAL MED/LAT CONDYLE Left 3/21/2022    Procedure: OPEN REDUCTION W/ INTERNAL FIXATION (ORIF) LEFT DISTAL FEMUR FRACTURE;  Surgeon: Sandy Ortega MD;  Location: [facility];  Service: Orthopedics    REPLACEMENT TOTAL KNEE Left     TONSILECTOMY AND ADNOIDECTOMY  1971    Tonsilectomy only    TONSILLECTOMY      TUBAL LIGATION  1989       Family History   Problem Relation Age of Onset    Dementia Mother     Alzheimer's disease Mother     Kidney disease Father     Hypertension Father     No Known Problems Sister     Hypertension Sister     Hyperlipidemia Sister  No Known Problems Brother     Heart attack Maternal Grandmother     Parkinsonism Maternal Grandfather     No Known Problems Paternal Grandmother     No Known Problems Paternal Grandfather     No Known Problems Brother     Scoliosis Son     Scoliosis Son     Anxiety disorder Son     Depression Son        Social History     Occupational History    Not on file   Tobacco Use    Smoking status: Never Smoker    Smokeless tobacco: Never Used   Vaping Use    Vaping Use: Never used   Substance and Sexual Activity    Alcohol use: Never     Alcohol/week: 0 0 standard drinks    Drug use: Never    Sexual activity: Not on file         Current Outpatient Medications:     Ascorbic Acid (VITAMIN C ADULT GUMMIES PO), Take 500 mg by mouth daily, Disp: , Rfl:     cholecalciferol (VITAMIN D3) 400 units tablet, Take 400 Units by mouth daily, Disp: , Rfl:     Diclofenac Sodium (VOLTAREN) 1 %, Apply 2 g topically 4 (four) times a day as needed (pain), Disp: 350 g, Rfl: 0    gabapentin (Neurontin) 300 mg capsule, Take 1 capsule (300 mg total) by mouth daily at bedtime, Disp: 30 capsule, Rfl: 1    Multiple Vitamins-Minerals (MULTIVITAMIN GUMMIES ADULT PO), Take by mouth daily, Disp: , Rfl:     naproxen sodium (ALEVE) 220 MG tablet, Take 220 mg by mouth once as needed , Disp: , Rfl:     vitamin E, tocopherol, 1,000 units capsule, Take 1,000 Units by mouth daily, Disp: , Rfl:     No Known Allergies    Physical Exam:    /83   Pulse 64   Resp 18   Ht 5' 4" (1 626 m)   Wt 61 7 kg (136 lb)   BMI 23 34 kg/m²     Constitutional:normal, well developed, well nourished, alert, in no distress and non-toxic and no overt pain behavior    Eyes:anicteric  HEENT:grossly intact  Neck:supple, symmetric, trachea midline and no masses   Pulmonary:even and unlabored  Cardiovascular:No edema or pitting edema present  Skin:Normal without rashes or lesions and well hydrated  Psychiatric:Mood and affect appropriate  Neurologic:Cranial Nerves II-XII grossly intact  Musculoskeletal:normal

## 2022-08-09 ENCOUNTER — OFFICE VISIT (OUTPATIENT)
Dept: PAIN MEDICINE | Facility: CLINIC | Age: 57
End: 2022-08-09
Payer: COMMERCIAL

## 2022-08-09 VITALS
SYSTOLIC BLOOD PRESSURE: 143 MMHG | BODY MASS INDEX: 23.22 KG/M2 | RESPIRATION RATE: 18 BRPM | HEART RATE: 64 BPM | WEIGHT: 136 LBS | DIASTOLIC BLOOD PRESSURE: 83 MMHG | HEIGHT: 64 IN

## 2022-08-09 DIAGNOSIS — G89.4 CHRONIC PAIN SYNDROME: Primary | ICD-10-CM

## 2022-08-09 DIAGNOSIS — M25.562 CHRONIC PAIN OF LEFT KNEE: ICD-10-CM

## 2022-08-09 DIAGNOSIS — G89.29 CHRONIC PAIN OF LEFT KNEE: ICD-10-CM

## 2022-08-09 DIAGNOSIS — S72.402S CLOSED FRACTURE OF DISTAL END OF LEFT FEMUR, UNSPECIFIED FRACTURE MORPHOLOGY, SEQUELA: ICD-10-CM

## 2022-08-09 PROCEDURE — 99214 OFFICE O/P EST MOD 30 MIN: CPT | Performed by: ANESTHESIOLOGY

## 2022-08-09 RX ORDER — GABAPENTIN 300 MG/1
300 CAPSULE ORAL 3 TIMES DAILY
Qty: 90 CAPSULE | Refills: 0 | Status: SHIPPED | OUTPATIENT
Start: 2022-08-09 | End: 2022-09-06 | Stop reason: DRUGHIGH

## 2022-08-11 ENCOUNTER — OFFICE VISIT (OUTPATIENT)
Dept: PHYSICAL THERAPY | Facility: CLINIC | Age: 57
End: 2022-08-11
Payer: COMMERCIAL

## 2022-08-11 DIAGNOSIS — S72.492D OTHER CLOSED FRACTURE OF DISTAL END OF LEFT FEMUR WITH ROUTINE HEALING, SUBSEQUENT ENCOUNTER: Primary | ICD-10-CM

## 2022-08-11 PROCEDURE — 97112 NEUROMUSCULAR REEDUCATION: CPT | Performed by: PHYSICAL THERAPIST

## 2022-08-11 PROCEDURE — 97110 THERAPEUTIC EXERCISES: CPT | Performed by: PHYSICAL THERAPIST

## 2022-08-11 PROCEDURE — 97530 THERAPEUTIC ACTIVITIES: CPT | Performed by: PHYSICAL THERAPIST

## 2022-08-11 NOTE — PROGRESS NOTES
PT Re-Evaluation     Today's date: 2022  Patient name: Lindsay Sena  : 1965  MRN: [id number]  Referring provider: Lee Michael MD  Dx:   Encounter Diagnosis     ICD-10-CM    1  Other closed fracture of distal end of left femur with routine healing, subsequent encounter  S72 492D        Start Time: 1015  Stop Time: 1100  Total time in clinic (min): 45 minutes    Assessment  Assessment details: Upon re-examination, pt has improved strength and ROM of her L knee as well as decreased pain with functional activities  Pt's FOTO score has improved, demonstrating an improved ability to perform functional activities  Pt is now able to descend stairs with a step over step pattern as well as an improved activity tolerance  However, pt continues to have deficits of strength and ROM of her LLE as well as having increased pain and difficulty performing functional activities  Pt continues to have difficulty with stair negotiation as well as having a limited walking tolerance  Pt plan of care will focus on the previously mentioned deficits and limitations  Pt would continue to benefit from skilled physical therapy to facilitate a safe return to her prior level of function  Impairments: abnormal gait, abnormal or restricted ROM, activity intolerance, impaired balance, impaired physical strength, lacks appropriate home exercise program, pain with function and weight-bearing intolerance    Symptom irritability: moderateUnderstanding of Dx/Px/POC: excellent   Prognosis: good    Goals  STG to be achieved in 4 weeks: The patient will report a decrease in left knee "at worst" subjective pain rating score to at least 5/10 to allow for improved tolerance for weightbearing activities  Ongoing   The patient will increase left knee flexion AROM to at least 90 degrees to allow for improved functional mobility   MET  The patient will increase left knee extension MMT score to at least 4/5 to allow for improved functional mobility  MET    LTG to be achieved by DC: The patient will be independent in comprehensive HEP  The patient will report no pain with usual activities  Ongoing  The patient will improve left knee AROM to [facility] to allow for improved functional mobility  Ongoing  The patient will increase left knee MMT score to [facility] to allow for improved functional mobility  Ongoing  The patient will be able to negotiate the stairs in her home in a reciprocal gait pattern without difficulty  Ongoing  The patient will be able to ambulate one mile without difficulty  Ongoing      Plan  Patient would benefit from: skilled physical therapy  Planned modality interventions: thermotherapy: hydrocollator packs, TENS and cryotherapy  Planned therapy interventions: manual therapy, joint mobilization, balance/weight bearing training, neuromuscular re-education, patient education, flexibility, strengthening, stretching, therapeutic activities, therapeutic exercise, gait training and home exercise program  Frequency: 2x week  Duration in weeks: 8  Plan of Care beginning date: 8/11/2022  Plan of Care expiration date: 10/6/2022  Treatment plan discussed with: patient        Subjective Evaluation    History of Present Illness  Date of surgery: 3/21/2022  Mechanism of injury: Joanie Geiger presents to therapy s/p ORIF left distal femur, DOS 3/21/22  Pt reports having a significant improvement in strength and ROM of her LLE, however, she does still have difficulty with bending of her L knee  Pt reports she is now able to go down stairs with greater ease, however, she does still have some difficulty with stair negotiation  Pt reports she also continues to have a limited walking tolerance after around two minutes  Pt reports she also does still have difficulty getting into and out the chair, however, it has improved significantly since starting therapy  Pt continues to feel 75% of her prior level of function before her injury    Pain  Current pain rating: 3  At best pain rating: 3  At worst pain ratin  Location: L knee   Quality: sharp and discomfort  Relieving factors: medications, ice and change in position  Aggravating factors: standing, sitting, walking and stair climbing    Social Support  Steps to enter house: yes  Stairs in house: yes   Lives in: multiple-level home  Lives with: adult children    Employment status: not working  Treatments  Previous treatment: immobilization and medication  Current treatment: physical therapy  Patient Goals  Patient goals for therapy: decreased pain, increased motion, improved balance, independence with ADLs/IADLs and increased strength  Patient goal: start walking again         Objective     Observations     Additional Observation Details  (+) incision on medial aspect of L knee  Approximately 13 cm in length  Well approximated, no signs of infection    Active Range of Motion   Left Hip   Flexion: 105 degrees   Abduction: 30 degrees     Right Hip   Flexion: 110 degrees   Abduction: 30 degrees   Left Knee   Flexion: 110 degrees with pain  Extension: 0 degrees with pain    Right Knee   Flexion: WFL  Extension: WFL    Passive Range of Motion   Left Knee   Flexion: 112 degrees with pain    Strength/Myotome Testing     Left Hip   Planes of Motion   Flexion: 4  Abduction: 4    Right Hip   Planes of Motion   Flexion: 5  Abduction: 5    Left Knee   Flexion: 4  Extension: 4    Right Knee   Flexion: 5  Extension: 5    Left Ankle/Foot   Dorsiflexion: 4+  Plantar flexion: 4+    Right Ankle/Foot   Dorsiflexion: 5  Plantar flexion: 5    Ambulation   Weight-Bearing Status   Weight-Bearing Status (Left): non-weight-bearing   Assistive device used: crutches             Precautions: s/p ORIF left distal femur DOS 3/21/22 , Hx L patella replacement 2016  Per ortho visit on 5/3/22: ROM exercises, may progress to strengthening  WBAT LLE        Access Code: [facility]     Manuals    L knee PROM/mobs supine 4' 4' 4' 4'  8' 8' 5' 8' 8'   Seated L knee flexion 4'     5' 5' 5'     Supine knee ext stretch      10"x5 10"x5      Hamstring stretch             Neuro Re-Ed             Quad set with towel under ankle             Quad set with LAQ             Quad set with SLR      3x10 4# 3x10 4# 3x10 4# 3x10 4#    Pt education 2' 3' 3' 3'  4' 4' 4' 4' 4'   Sidestepping c band 3x 50' blue 4x 50' blue 4x 50' 4x 50' blue  3x 50' blue 3x 50' blue 3x 50' blue 3x 50' blue    SL ball toss c rebounder  20x red 20x red 20x red                      Ther Ex             Standing knee flexion      3x10 5# 3x1- 5# 3x10 5# 3x10 5# 3x10 5#   SL heel raises      3x10 5# 3x10 5# 3x10 5# 3x10 5# 3x10 5#   Prone quad stretch 1x10 10" 1x10 10" 1x10 10" 1x10 10"  1x10 10" 1x10 10" 1x10 10" 1x10 10" 1x10 10"   Pt education             Upright bike 7'  5' 6' 6'  7' 7' 7' 7' 7'   Wall sits 2x10 3"     2x10 3" 2x10 3" 2x10 3" 2x10 3" 2x10 3"   Leg press 3x10 115# 3x10 115# 3x10 115# 3x10 115#   2x10 105# 2x10 105# 2x10 115# 2x10 115#   SL leg press 3x10 55# 3x10 65# 3x10 65# 3x10 65#   2x10 55# 2x10 55# 2x10 55# 2x10 55#   Knee flexion stretch on step 2x10 10" 2x10 10" 2x10 2x10     1x10 10" 1x10 10"   Ther Activity             Side step ups 3x10 8" c airex 3x10 8" c airex  2x10 10" airex  3x10 8" 3x10 8" 3x10 8" 3x10 8" c airex 3x10 8" c airex   Step downs 2x10 6" 2x10 6" 3x10 6" 3x10 6"  2x10 6" 2x10 6" 2x10 6"  2x10 6"   BW squats 2x10 2x10 x25 x25  3x10 3x10 3x10     Step ups             STS from 20" box      3x10 20# 3x10 20# 3x10 20#     Split squats 2x10 2x10 ea 2x10 2x10      2x10   Gait Training                                       Modalities

## 2022-08-15 ENCOUNTER — OFFICE VISIT (OUTPATIENT)
Dept: PHYSICAL THERAPY | Facility: CLINIC | Age: 57
End: 2022-08-15
Payer: COMMERCIAL

## 2022-08-15 DIAGNOSIS — S72.492D OTHER CLOSED FRACTURE OF DISTAL END OF LEFT FEMUR WITH ROUTINE HEALING, SUBSEQUENT ENCOUNTER: Primary | ICD-10-CM

## 2022-08-15 PROCEDURE — 97110 THERAPEUTIC EXERCISES: CPT | Performed by: PHYSICAL THERAPIST

## 2022-08-15 PROCEDURE — 97530 THERAPEUTIC ACTIVITIES: CPT | Performed by: PHYSICAL THERAPIST

## 2022-08-15 PROCEDURE — 97112 NEUROMUSCULAR REEDUCATION: CPT | Performed by: PHYSICAL THERAPIST

## 2022-08-15 PROCEDURE — 97140 MANUAL THERAPY 1/> REGIONS: CPT | Performed by: PHYSICAL THERAPIST

## 2022-08-15 NOTE — PROGRESS NOTES
Daily Note     Today's date: 8/15/2022  Patient name: Sabas Low  : 1965  MRN: [id number]  Referring provider: Renetta Yost MD  Dx:   Encounter Diagnosis     ICD-10-CM    1  Other closed fracture of distal end of left femur with routine healing, subsequent encounter  S72 492D        Start Time: 1145  Stop Time: 1230  Total time in clinic (min): 45 minutes    Subjective: Pt reports having increased stiffness in her L knee at the start of therapy today  Pt reports having increased pain in her L knee after last therapy session  Objective: See treatment diary below      Assessment: Tolerated treatment well  Patient demonstrated fatigue post treatment, exhibited good technique with therapeutic exercises and would benefit from continued PT  Pt was able to progress today by increasing resistance of her SL leg press exercise as well as increasing difficulty for her SLS exercise  Pt continues to limited L knee flexion and only able to achieve 110 degrees of flexion today  Pt required min verbal cues to facilitate performance of proper technique  Assess pt response to treatment at next visit  Plan: Continue per plan of care  Precautions: s/p ORIF left distal femur DOS 3/21/22 , Hx L patella replacement 2016  Per ortho visit on 5/3/22: ROM exercises, may progress to strengthening  WBAT LLE        Access Code: [facility]     Manuals 8/1 8/4 8/8 8/11 8/15        L knee PROM/mobs supine 4' 4' 4' 4' 4'        L knee distraction c post mobs 4'    3'        Seated knee flexion stretch     1x10 10"                     Neuro Re-Ed             Quad set with towel under ankle             Quad set with [employer] set with SLR             Pt education 2' 3' 3' 3' 3'        Sidestepping c band 3x 50' blue 4x 50' blue 4x 50' 4x 50' blue 4x 50' blue        SL ball toss c rebounder  20x red 20x red 20x red 25x yellow                     Ther Ex             Standing knee flexion             SL heel raises             Prone quad stretch 1x10 10" 1x10 10" 1x10 10" 1x10 10" 1x10 10"        Pt education             Upright bike 7'  5' 6' 6' 6'        Wall sits 2x10 3"            Leg press 3x10 115# 3x10 115# 3x10 115# 3x10 115# 3x10 115#        SL leg press 3x10 55# 3x10 65# 3x10 65# 3x10 65# 3x10 75#        Knee flexion stretch on step 2x10 10" 2x10 10" 2x10 2x10 2x10         Ther Activity             Side step ups 3x10 8" c airex 3x10 8" c airex  2x10 10" airex 2x10 10" airex        Step downs 2x10 6" 2x10 6" 3x10 6" 3x10 6" 3x10 6"        BW squats 2x10 2x10 x25 x25 x25        Step ups             STS from 20" box             Split squats 2x10 2x10 ea 2x10 2x10 2x10        Gait Training                                       Modalities

## 2022-08-16 ENCOUNTER — APPOINTMENT (OUTPATIENT)
Dept: PHYSICAL THERAPY | Facility: CLINIC | Age: 57
End: 2022-08-16
Payer: COMMERCIAL

## 2022-08-18 ENCOUNTER — OFFICE VISIT (OUTPATIENT)
Dept: PHYSICAL THERAPY | Facility: CLINIC | Age: 57
End: 2022-08-18
Payer: COMMERCIAL

## 2022-08-18 DIAGNOSIS — S72.492D OTHER CLOSED FRACTURE OF DISTAL END OF LEFT FEMUR WITH ROUTINE HEALING, SUBSEQUENT ENCOUNTER: Primary | ICD-10-CM

## 2022-08-18 PROCEDURE — 97530 THERAPEUTIC ACTIVITIES: CPT | Performed by: PHYSICAL THERAPIST

## 2022-08-18 PROCEDURE — 97112 NEUROMUSCULAR REEDUCATION: CPT | Performed by: PHYSICAL THERAPIST

## 2022-08-18 PROCEDURE — 97140 MANUAL THERAPY 1/> REGIONS: CPT | Performed by: PHYSICAL THERAPIST

## 2022-08-18 PROCEDURE — 97110 THERAPEUTIC EXERCISES: CPT | Performed by: PHYSICAL THERAPIST

## 2022-08-18 NOTE — PROGRESS NOTES
Daily Note     Today's date: 2022  Patient name: Kimberly Flores  : 1965  MRN: [id number]  Referring provider: Shyanne Franklin MD  Dx:   Encounter Diagnosis     ICD-10-CM    1  Other closed fracture of distal end of left femur with routine healing, subsequent encounter  S72 492D        Start Time: 1015  Stop Time: 1108  Total time in clinic (min): 53 minutes    Subjective: Pt reports mild discomfort in Left knee  She reports no other medical changes since previous session  Objective: See treatment diary below      Assessment: Tolerated treatment well  Patient demonstrated fatigue post treatment, exhibited good technique with therapeutic exercises and would benefit from continued PT  Pt was able to progress today by increasing resistance for leg press exercise  Pt demonstrated improved L knee flexion today with approximately 113 degrees  Pt required min verbal cues to facilitate performance of proper technique  Assess pt response to treatment at next visit  Plan: Continue per plan of care  Precautions: s/p ORIF left distal femur DOS 3/21/22 , Hx L patella replacement 2016  Per ortho visit on 5/3/22: ROM exercises, may progress to strengthening  WBAT LLE        Access Code: [facility]     Manuals 8/1 8/4 8/8 8/11 8/15 8/18       L knee PROM/mobs supine 4' 4' 4' 4' 4' 4'       L knee distraction c post mobs 4'    3' 3'       Seated knee flexion stretch     1x10 10" 1x10 10"                    Neuro Re-Ed             Quad set with towel under ankle             Quad set with [employer] set with SLR             Pt education 2' 3' 3' 3' 3' 3'       Sidestepping c band 3x 50' blue 4x 50' blue 4x 50' 4x 50' blue 4x 50' blue 4x 50' blue       SL ball toss c rebounder  20x red 20x red 20x red 25x yellow 25x yellow                    Ther Ex             Standing knee flexion             SL heel raises             Prone quad stretch 1x10 10" 1x10 10" 1x10 10" 1x10 10" 1x10 10" 1x10 10" Pt education             Upright bike 7'  5' 6' 6' 6' Recumb7'       Wall sits 2x10 3"            Leg press 3x10 115# 3x10 115# 3x10 115# 3x10 115# 3x10 115# 3x10 125#       SL leg press 3x10 55# 3x10 65# 3x10 65# 3x10 65# 3x10 75# 3x10 75#       Knee flexion stretch on step 2x10 10" 2x10 10" 2x10 2x10 2x10  2x10       Ther Activity             Side step ups 3x10 8" c airex 3x10 8" c airex  2x10 10" airex 2x10 10" airex 2x10 10" airex       Step downs 2x10 6" 2x10 6" 3x10 6" 3x10 6" 3x10 6" 3x10 6"       BW squats 2x10 2x10 x25 x25 x25 x25       Step ups             STS from 20" box             Split squats 2x10 2x10 ea 2x10 2x10 2x10 2x10       Gait Training                                       Modalities

## 2022-08-25 ENCOUNTER — OFFICE VISIT (OUTPATIENT)
Dept: PHYSICAL THERAPY | Facility: CLINIC | Age: 57
End: 2022-08-25
Payer: COMMERCIAL

## 2022-08-25 DIAGNOSIS — S72.492D OTHER CLOSED FRACTURE OF DISTAL END OF LEFT FEMUR WITH ROUTINE HEALING, SUBSEQUENT ENCOUNTER: Primary | ICD-10-CM

## 2022-08-25 PROCEDURE — 97112 NEUROMUSCULAR REEDUCATION: CPT | Performed by: PHYSICAL THERAPIST

## 2022-08-25 PROCEDURE — 97530 THERAPEUTIC ACTIVITIES: CPT | Performed by: PHYSICAL THERAPIST

## 2022-08-25 PROCEDURE — 97140 MANUAL THERAPY 1/> REGIONS: CPT | Performed by: PHYSICAL THERAPIST

## 2022-08-25 PROCEDURE — 97110 THERAPEUTIC EXERCISES: CPT | Performed by: PHYSICAL THERAPIST

## 2022-08-25 NOTE — PROGRESS NOTES
Daily Note     Today's date: 2022  Patient name: Shantal Samson  : 1965  MRN: [id number]  Referring provider: Chayito Crow MD  Dx:   Encounter Diagnosis     ICD-10-CM    1  Other closed fracture of distal end of left femur with routine healing, subsequent encounter  S72 492D        Start Time: 1545  Stop Time: 1630  Total time in clinic (min): 45 minutes    Subjective: Pt reports she has been having decreased pain in her L knee, however, she continues to have difficulty with stairs  Objective: See treatment diary below      Assessment: Tolerated treatment well  Patient demonstrated fatigue post treatment, exhibited good technique with therapeutic exercises and would benefit from continued PT  Pt was able to progress today with re-inclusion of hamstring curls  Pt continues to have limited L knee flexion and achieved approximately 111 degrees today  Pt required min verbal cues to facilitate performance of proper technique  Assess pt response to treatment at next visit  Plan: Continue per plan of care  Precautions: s/p ORIF left distal femur DOS 3/21/22 , Hx L patella replacement 2016  Per ortho visit on 5/3/22: ROM exercises, may progress to strengthening  WBAT LLE        Access Code: [facility]     Manuals 8/1 8/4 8/8 8/11 8/15 8/18 8/25      L knee PROM/mobs supine 4' 4' 4' 4' 4' 4' 8'      L knee distraction c post mobs 4'    3' 3'       Seated knee flexion stretch     1x10 10" 1x10 10"                    Neuro Re-Ed             Quad set with towel under ankle             Quad set with [employer] set with SLR             Pt education 2' 3' 3' 3' 3' 3' 3'      Sidestepping c band 3x 50' blue 4x 50' blue 4x 50' 4x 50' blue 4x 50' blue 4x 50' blue 4x 50'      SL ball toss c rebounder  20x red 20x red 20x red 25x yellow 25x yellow 25 yellow                   Ther Ex             Standing knee flexion       3x10 5#      SL heel raises             Prone quad stretch 1x10 10" 1x10 10" 1x10 10" 1x10 10" 1x10 10" 1x10 10" 1x10 10"      Pt education             Upright bike 7'  5' 6' 6' 6' Recumb7' 7'      Wall sits 2x10 3"            Leg press 3x10 115# 3x10 115# 3x10 115# 3x10 115# 3x10 115# 3x10 125# 3x10 125#      SL leg press 3x10 55# 3x10 65# 3x10 65# 3x10 65# 3x10 75# 3x10 75# 3x10 75#      Knee flexion stretch on step 2x10 10" 2x10 10" 2x10 2x10 2x10  2x10 2x10      Ther Activity             Side step ups 3x10 8" c airex 3x10 8" c airex  2x10 10" airex 2x10 10" airex 2x10 10" airex 2x10 10" airex      Step downs 2x10 6" 2x10 6" 3x10 6" 3x10 6" 3x10 6" 3x10 6" 3x10 6"      BW squats 2x10 2x10 x25 x25 x25 x25 x25      Step ups             STS from 20" box             Split squats 2x10 2x10 ea 2x10 2x10 2x10 2x10 2x10      Gait Training                                       Modalities

## 2022-08-26 ENCOUNTER — OFFICE VISIT (OUTPATIENT)
Dept: PHYSICAL THERAPY | Facility: CLINIC | Age: 57
End: 2022-08-26
Payer: COMMERCIAL

## 2022-08-26 DIAGNOSIS — S72.492D OTHER CLOSED FRACTURE OF DISTAL END OF LEFT FEMUR WITH ROUTINE HEALING, SUBSEQUENT ENCOUNTER: Primary | ICD-10-CM

## 2022-08-26 PROCEDURE — 97110 THERAPEUTIC EXERCISES: CPT | Performed by: PHYSICAL THERAPIST

## 2022-08-26 PROCEDURE — 97112 NEUROMUSCULAR REEDUCATION: CPT | Performed by: PHYSICAL THERAPIST

## 2022-08-26 PROCEDURE — 97530 THERAPEUTIC ACTIVITIES: CPT | Performed by: PHYSICAL THERAPIST

## 2022-08-26 PROCEDURE — 97140 MANUAL THERAPY 1/> REGIONS: CPT | Performed by: PHYSICAL THERAPIST

## 2022-08-26 NOTE — PROGRESS NOTES
Daily Note     Today's date: 2022  Patient name: Lisa Anglin  : 1965  MRN: [id number]  Referring provider: Hayde Kent MD  Dx:   Encounter Diagnosis     ICD-10-CM    1  Other closed fracture of distal end of left femur with routine healing, subsequent encounter  S72 492D        Start Time: 1145  Stop Time: 1230  Total time in clinic (min): 45 minutes    Subjective: Pt reports having slight pain and stiffness of her L knee after her therapy session yesterday  Objective: See treatment diary below      Assessment: Tolerated treatment well  Patient demonstrated fatigue post treatment, exhibited good technique with therapeutic exercises and would benefit from continued PT  Pt was able to progress today with inclusion of isometric holds into her goblet and split squats today  Pt was also able to progress with inclusion of side step downs  Pt required min verbal cues to facilitate performance of proper technique  Assess pt response to treatment at next visit  Plan: Continue per plan of care  Precautions: s/p ORIF left distal femur DOS 3/21/22 , Hx L patella replacement 2016  Per ortho visit on 5/3/22: ROM exercises, may progress to strengthening  WBAT LLE        Access Code: [facility]     Manuals 8/1 8/4 8/8 8/11 8/15 8/18 8/25 8/26     L knee PROM/mobs supine 4' 4' 4' 4' 4' 4' 8' 8'     L knee distraction c post mobs 4'    3' 3'       Seated knee flexion stretch     1x10 10" 1x10 10"                    Neuro Re-Ed             Quad set with towel under ankle             Quad set with [employer] set with SLR             Pt education 2' 3' 3' 3' 3' 3' 3'      Sidestepping c band 3x 50' blue 4x 50' blue 4x 50' 4x 50' blue 4x 50' blue 4x 50' blue 4x 50' 4x 50'     SL ball toss c rebounder  20x red 20x red 20x red 25x yellow 25x yellow 25x yellow 25x yellow     Side step downs        2x10 6"     Ther Ex             Standing knee flexion       3x10 5# 3x10 5#     SL heel raises Prone quad stretch 1x10 10" 1x10 10" 1x10 10" 1x10 10" 1x10 10" 1x10 10" 1x10 10" 1x10 10"     Pt education             Upright bike 7'  5' 6' 6' 6' Recumb7' 7' 7'     Wall sits 2x10 3"            Leg press 3x10 115# 3x10 115# 3x10 115# 3x10 115# 3x10 115# 3x10 125# 3x10 125# 3x10 125#     SL leg press 3x10 55# 3x10 65# 3x10 65# 3x10 65# 3x10 75# 3x10 75# 3x10 75# 3x10 75#     Knee flexion stretch on step 2x10 10" 2x10 10" 2x10 2x10 2x10  2x10 2x10 2x10     Ther Activity             Side step ups 3x10 8" c airex 3x10 8" c airex  2x10 10" airex 2x10 10" airex 2x10 10" airex 2x10 10" airex 3x10 10" airex     Fwd Step downs 2x10 6" 2x10 6" 3x10 6" 3x10 6" 3x10 6" 3x10 6" 3x10 6" 3x10 6"     Goblet pause squats 2x10 2x10 x25 x25 x25 x25 x25 2x10 15# 3"     Step ups             STS from 20" box             Split squats 2x10 2x10 ea 2x10 2x10 2x10 2x10 2x10 2x10 3" isometric     Gait Training                                       Modalities

## 2022-08-27 DIAGNOSIS — Z87.81 S/P ORIF (OPEN REDUCTION INTERNAL FIXATION) FRACTURE: Primary | ICD-10-CM

## 2022-08-27 DIAGNOSIS — Z98.890 S/P ORIF (OPEN REDUCTION INTERNAL FIXATION) FRACTURE: Primary | ICD-10-CM

## 2022-08-29 ENCOUNTER — OFFICE VISIT (OUTPATIENT)
Dept: PHYSICAL THERAPY | Facility: CLINIC | Age: 57
End: 2022-08-29
Payer: COMMERCIAL

## 2022-08-29 DIAGNOSIS — S72.492D OTHER CLOSED FRACTURE OF DISTAL END OF LEFT FEMUR WITH ROUTINE HEALING, SUBSEQUENT ENCOUNTER: Primary | ICD-10-CM

## 2022-08-29 PROCEDURE — 97110 THERAPEUTIC EXERCISES: CPT

## 2022-08-29 PROCEDURE — 97112 NEUROMUSCULAR REEDUCATION: CPT

## 2022-08-29 PROCEDURE — 97530 THERAPEUTIC ACTIVITIES: CPT

## 2022-08-29 NOTE — PROGRESS NOTES
Daily Note     Today's date: 2022  Patient name: Barbara Ham  : 1965  MRN: [id number]  Referring provider: Nona Marvin MD  Dx:   Encounter Diagnosis     ICD-10-CM    1  Other closed fracture of distal end of left femur with routine healing, subsequent encounter  S79 711D                   Subjective: Pt reports pain 5/10  Objective: See treatment diary below      Assessment: Tolerated treatment well  Increased weight for leg press today  Good effort noted with all exercises  Cues needed for step down exercises for controlled motion  L knee flexion remains limited  Patient demonstrated fatigue post treatment, exhibited good technique with therapeutic exercises and would benefit from continued PT      Plan: Continue per plan of care  Precautions: s/p ORIF left distal femur DOS 3/21/22 , Hx L patella replacement 2016  Per ortho visit on 5/3/22: ROM exercises, may progress to strengthening  WBAT LLE        Access Code: [facility]     Manuals 8/1 8/4 8/8 8/11 8/15 8/18 8/25 8/26 8/29    L knee PROM/mobs supine 4' 4' 4' 4' 4' 4' 8' 8' PROM 6'    L knee distraction c post mobs 4'    3' 3'       Seated knee flexion stretch     1x10 10" 1x10 10"                    Neuro Re-Ed             Quad set with towel under ankle             Quad set with [employer] set with SLR             Pt education 2' 3' 3' 3' 3' 3' 3'      Sidestepping c band 3x 50' blue 4x 50' blue 4x 50' 4x 50' blue 4x 50' blue 4x 50' blue 4x 50' 4x 50' 4x 50' blue    SL ball toss c rebounder  20x red 20x red 20x red 25x yellow 25x yellow 25x yellow 25x yellow 25x yellow    Side step downs        2x10 6" 2x10 6"    Ther Ex             Standing knee flexion       3x10 5# 3x10 5# 3x10 5#    SL heel raises             Prone quad stretch 1x10 10" 1x10 10" 1x10 10" 1x10 10" 1x10 10" 1x10 10" 1x10 10" 1x10 10" 1x10 10"    Pt education             Upright bike 7'  5' 6' 6' 6' Recumb7' 7' 7' 7'    Wall sits 2x10 3" Leg press 3x10 115# 3x10 115# 3x10 115# 3x10 115# 3x10 115# 3x10 125# 3x10 125# 3x10 125# 3x10 135#    SL leg press 3x10 55# 3x10 65# 3x10 65# 3x10 65# 3x10 75# 3x10 75# 3x10 75# 3x10 75# 3x10 85#    Knee flexion stretch on step 2x10 10" 2x10 10" 2x10 2x10 2x10  2x10 2x10 2x10 2x10    Ther Activity             Side step ups 3x10 8" c airex 3x10 8" c airex  2x10 10" airex 2x10 10" airex 2x10 10" airex 2x10 10" airex 3x10 10" airex 3x10 10" airex    Fwd Step downs 2x10 6" 2x10 6" 3x10 6" 3x10 6" 3x10 6" 3x10 6" 3x10 6" 3x10 6" 3x10 6"    Goblet pause squats 2x10 2x10 x25 x25 x25 x25 x25 2x10 15# 3" 2x10 15# 3"    Step ups             STS from 20" box             Split squats 2x10 2x10 ea 2x10 2x10 2x10 2x10 2x10 2x10 3" isometric 2x10 3" isometric    Gait Training                                       Modalities

## 2022-09-01 NOTE — PROGRESS NOTES
Pain Medicine Follow-Up Note    Assessment:  1  Chronic pain syndrome    2  Chronic pain of left knee    3  Closed fracture of distal end of left femur, unspecified fracture morphology, sequela        Plan:  New Medications Ordered This Visit   Medications    gabapentin (Neurontin) 600 MG tablet     Sig: Take 1 tablet (600 mg total) by mouth 3 (three) times a day     Dispense:  90 tablet     Refill:  0     My impressions and treatment recommendations were discussed in detail with the patient who verbalized understanding and had no further questions  The patient is reporting pain primarily in her left knee  She does report that her symptoms are involving her left knee and the increase in gabapentin was helpful  She would like to increase the gabapentin further at today's visit  As such, I felt a reasonable to increase the patient on a titration schedule to a goal dosage of gabapentin 600 mg 3 times daily  The patient has not yet tried the diclofenac gel to help with her knee pain  She states that she was using up the compounding cream that I had prescribed for her previously  She will trial using the diclofenac gel to see if that helps with her overall pain as well  Follow-up is planned in 4 weeks time or sooner as warranted  Discharge instructions were provided  I personally saw and examined the patient and I agree with the above discussed plan of care  History of Present Illness:    Donna Hernandez is a 61 y o  female who presents to [facility] for interval re-evaluation of the above stated pain complaints  The patient has a past medical and chronic pain history as outlined in the assessment section  She was last seen on August 9, 2022  At today's office visit, the patient's pain score is 4/10 on the verbal numerical pain rating scale  The patient states that her pain is primarily involving her left knee  She describes her pain as worse in the morning and night  Her pain is intermittent in nature  She reports the quality of her pain as burning, sharp, and numbness  She is reporting 55% relief of symptoms with the combination of her medications  She does report that the increase in gabapentin was very helpful for her overall pain  She is requesting a higher dosage of gabapentin at today's visit  Other than as stated above, the patient denies any interval changes in medications, medical condition, mental condition, symptoms, or allergies since the last office visit  Review of Systems:    Review of Systems   Respiratory: Negative for shortness of breath  Cardiovascular: Negative for chest pain  Gastrointestinal: Negative for constipation, diarrhea, nausea and vomiting  Musculoskeletal: Positive for gait problem  Negative for arthralgias, joint swelling and myalgias  Decreased ROM    Skin: Negative for rash  Neurological: Negative for dizziness, seizures and weakness  All other systems reviewed and are negative          Patient Active Problem List   Diagnosis    Intractable migraine without aura and without status migrainosus    Closed fracture of left distal femur (HCC)    Postoperative hypoxia    Postoperative pain    Migraine    Chronic pain syndrome    Chronic pain of left knee       Past Medical History:   Diagnosis Date    Arthritis     Migraines        Past Surgical History:   Procedure Laterality Date    CHOLECYSTECTOMY      GALLBLADDER SURGERY      JOINT REPLACEMENT      PATELLA SURGERY  2016    Patella replacement on left knee    [place] OPEN TX FEMORAL FRACTURE DISTAL MED/LAT CONDYLE Left 3/21/2022    Procedure: OPEN REDUCTION W/ INTERNAL FIXATION (ORIF) LEFT DISTAL FEMUR FRACTURE;  Surgeon: Primo Valentin MD;  Location: [facility];  Service: Orthopedics    REPLACEMENT TOTAL KNEE Left     TONSILECTOMY AND ADNOIDECTOMY  1971    Tonsilectomy only    TONSILLECTOMY      TUBAL LIGATION  1989       Family History   Problem Relation Age of Onset    Dementia Mother    [place] Alzheimer's disease Mother     Kidney disease Father     Hypertension Father     No Known Problems Sister     Hypertension Sister     Hyperlipidemia Sister     No Known Problems Brother     Heart attack Maternal Grandmother     Parkinsonism Maternal Grandfather     No Known Problems Paternal Grandmother     No Known Problems Paternal Grandfather     No Known Problems Brother     Scoliosis Son     Scoliosis Son     Anxiety disorder Son     Depression Son        Social History     Occupational History    Not on file   Tobacco Use    Smoking status: Never Smoker    Smokeless tobacco: Never Used   Vaping Use    Vaping Use: Never used   Substance and Sexual Activity    Alcohol use: Never     Alcohol/week: 0 0 standard drinks    Drug use: Never    Sexual activity: Not on file         Current Outpatient Medications:     Ascorbic Acid (VITAMIN C ADULT GUMMIES PO), Take 500 mg by mouth daily, Disp: , Rfl:     cholecalciferol (VITAMIN D3) 400 units tablet, Take 400 Units by mouth daily, Disp: , Rfl:     Diclofenac Sodium (VOLTAREN) 1 %, Apply 2 g topically 4 (four) times a day as needed (pain), Disp: 350 g, Rfl: 0    gabapentin (Neurontin) 600 MG tablet, Take 1 tablet (600 mg total) by mouth 3 (three) times a day, Disp: 90 tablet, Rfl: 0    Multiple Vitamins-Minerals (MULTIVITAMIN GUMMIES ADULT PO), Take by mouth daily, Disp: , Rfl:     naproxen sodium (ALEVE) 220 MG tablet, Take 220 mg by mouth once as needed , Disp: , Rfl:     vitamin E, tocopherol, 1,000 units capsule, Take 1,000 Units by mouth daily, Disp: , Rfl:     No Known Allergies    Physical Exam:    /84 (BP Location: Left arm, Patient Position: Sitting, Cuff Size: Standard)   Pulse 69   Ht 5' 4" (1 626 m)   Wt 60 8 kg (134 lb)   BMI 23 00 kg/m²     Constitutional:normal, well developed, well nourished, alert, in no distress and non-toxic and no overt pain behavior    Eyes:anicteric  HEENT:grossly intact  Neck:supple, symmetric, trachea midline and no masses   Pulmonary:even and unlabored  Cardiovascular:No edema or pitting edema present  Skin:Normal without rashes or lesions and well hydrated  Psychiatric:Mood and affect appropriate  Neurologic:Cranial Nerves II-XII grossly intact  Musculoskeletal:normal

## 2022-09-02 ENCOUNTER — OFFICE VISIT (OUTPATIENT)
Dept: PHYSICAL THERAPY | Facility: CLINIC | Age: 57
End: 2022-09-02
Payer: COMMERCIAL

## 2022-09-02 DIAGNOSIS — S72.492D OTHER CLOSED FRACTURE OF DISTAL END OF LEFT FEMUR WITH ROUTINE HEALING, SUBSEQUENT ENCOUNTER: Primary | ICD-10-CM

## 2022-09-02 PROCEDURE — 97112 NEUROMUSCULAR REEDUCATION: CPT | Performed by: PHYSICAL THERAPIST

## 2022-09-02 PROCEDURE — 97530 THERAPEUTIC ACTIVITIES: CPT | Performed by: PHYSICAL THERAPIST

## 2022-09-02 PROCEDURE — 97110 THERAPEUTIC EXERCISES: CPT | Performed by: PHYSICAL THERAPIST

## 2022-09-02 NOTE — PROGRESS NOTES
Daily Note     Today's date: 2022  Patient name: Darron Rice  : 1965  MRN: [id number]  Referring provider: Tacos Blanca MD  Dx:   Encounter Diagnosis     ICD-10-CM    1  Other closed fracture of distal end of left femur with routine healing, subsequent encounter  S72 492D        Start Time: 1015  Stop Time: 1102  Total time in clinic (min): 47 minutes    Subjective: Pt reports having stiffness of her L knee at the start of therapy today  Pt reports she is overall doing better but still having issues  Objective: See treatment diary below      Assessment: Tolerated treatment well  Patient demonstrated fatigue post treatment, exhibited good technique with therapeutic exercises and would benefit from continued PT  Pt was able to progress today by increasing resistance for her leg press exercise today as well as increasing height for her step downs  Pt required min verbal cues to facilitate performance of proper technique  Assess pt response to treatment at next visit  Plan: Continue per plan of care  Precautions: s/p ORIF left distal femur DOS 3/21/22 , Hx L patella replacement 2016  Per ortho visit on 5/3/22: ROM exercises, may progress to strengthening  WBAT LLE        Access Code: [facility]     Manuals 8/1 8/4 8/8 8/11 8/15 8/18 8/25 8/26 8/29 9/2   L knee PROM/mobs supine 4' 4' 4' 4' 4' 4' 8' 8' PROM 6' 4'   L knee distraction c post mobs 4'    3' 3'       Seated knee flexion stretch     1x10 10" 1x10 10"                    Neuro Re-Ed             Quad set with towel under ankle             Quad set with [employer] set with SLR             Pt education 2' 3' 3' 3' 3' 3' 3'      Sidestepping c band 3x 50' blue 4x 50' blue 4x 50' 4x 50' blue 4x 50' blue 4x 50' blue 4x 50' 4x 50' 4x 50' blue 4x 50' blue   SL ball toss c rebounder  20x red 20x red 20x red 25x yellow 25x yellow 25x yellow 25x yellow 25x yellow 20x yellow fwd/lat ea   Side step downs        2x10 6" 2x10 6" 2x10 8"   Ther Ex             Standing knee flexion       3x10 5# 3x10 5# 3x10 5#    SL heel raises             Prone quad stretch 1x10 10" 1x10 10" 1x10 10" 1x10 10" 1x10 10" 1x10 10" 1x10 10" 1x10 10" 1x10 10" 1x10 10"   Pt education             Upright bike 7'  5' 6' 6' 6' Recumb7' 7' 7' 7' 7'   Wall sits 2x10 3"            Leg press 3x10 115# 3x10 115# 3x10 115# 3x10 115# 3x10 115# 3x10 125# 3x10 125# 3x10 125# 3x10 135# 3x10 135#   SL leg press 3x10 55# 3x10 65# 3x10 65# 3x10 65# 3x10 75# 3x10 75# 3x10 75# 3x10 75# 3x10 85# 3x10 85#   Knee flexion stretch on step 2x10 10" 2x10 10" 2x10 2x10 2x10  2x10 2x10 2x10 2x10 2x10   Ther Activity             Side step ups 3x10 8" c airex 3x10 8" c airex  2x10 10" airex 2x10 10" airex 2x10 10" airex 2x10 10" airex 3x10 10" airex 3x10 10" airex 3x10 10" airex   Fwd Step downs 2x10 6" 2x10 6" 3x10 6" 3x10 6" 3x10 6" 3x10 6" 3x10 6" 3x10 6" 3x10 6" 3x10 6"   Goblet pause squats 2x10 2x10 x25 x25 x25 x25 x25 2x10 15# 3" 2x10 15# 3" 2x10 15# 3"   Step ups             STS from 20" box             Split squats 2x10 2x10 ea 2x10 2x10 2x10 2x10 2x10 2x10 3" isometric 2x10 3" isometric 2x10 3" isometric   Gait Training                                       Modalities

## 2022-09-06 ENCOUNTER — OFFICE VISIT (OUTPATIENT)
Dept: PHYSICAL THERAPY | Facility: CLINIC | Age: 57
End: 2022-09-06
Payer: COMMERCIAL

## 2022-09-06 ENCOUNTER — OFFICE VISIT (OUTPATIENT)
Dept: PAIN MEDICINE | Facility: CLINIC | Age: 57
End: 2022-09-06
Payer: COMMERCIAL

## 2022-09-06 VITALS
SYSTOLIC BLOOD PRESSURE: 150 MMHG | HEART RATE: 69 BPM | BODY MASS INDEX: 22.88 KG/M2 | HEIGHT: 64 IN | WEIGHT: 134 LBS | DIASTOLIC BLOOD PRESSURE: 84 MMHG

## 2022-09-06 DIAGNOSIS — M25.562 CHRONIC PAIN OF LEFT KNEE: ICD-10-CM

## 2022-09-06 DIAGNOSIS — G89.4 CHRONIC PAIN SYNDROME: Primary | ICD-10-CM

## 2022-09-06 DIAGNOSIS — S72.402S CLOSED FRACTURE OF DISTAL END OF LEFT FEMUR, UNSPECIFIED FRACTURE MORPHOLOGY, SEQUELA: ICD-10-CM

## 2022-09-06 DIAGNOSIS — S72.492D OTHER CLOSED FRACTURE OF DISTAL END OF LEFT FEMUR WITH ROUTINE HEALING, SUBSEQUENT ENCOUNTER: Primary | ICD-10-CM

## 2022-09-06 DIAGNOSIS — G89.29 CHRONIC PAIN OF LEFT KNEE: ICD-10-CM

## 2022-09-06 PROCEDURE — 97530 THERAPEUTIC ACTIVITIES: CPT

## 2022-09-06 PROCEDURE — 97112 NEUROMUSCULAR REEDUCATION: CPT

## 2022-09-06 PROCEDURE — 99214 OFFICE O/P EST MOD 30 MIN: CPT | Performed by: ANESTHESIOLOGY

## 2022-09-06 PROCEDURE — 97110 THERAPEUTIC EXERCISES: CPT

## 2022-09-06 RX ORDER — GABAPENTIN 600 MG/1
600 TABLET ORAL 3 TIMES DAILY
Qty: 90 TABLET | Refills: 0 | Status: SHIPPED | OUTPATIENT
Start: 2022-09-06 | End: 2022-10-04 | Stop reason: SDUPTHER

## 2022-09-06 NOTE — PROGRESS NOTES
Daily Note     Today's date: 2022  Patient name: Alden Cabrera  : 1965  MRN: [id number]  Referring provider: Gabi Luther MD  Dx:   Encounter Diagnosis     ICD-10-CM    1  Other closed fracture of distal end of left femur with routine healing, subsequent encounter  S76 [id number]                   Subjective: Pt reports stiffness today, the weather doesn't help  Objective: See treatment diary below      Assessment: Tolerated treatment well  She reports minor knee achiness with exercises and end rage stretching  She tolerates all exercises well despite discomfort  Good recall of cues given in previous session for correct exercise performance and technique  Patient demonstrated fatigue post treatment, exhibited good technique with therapeutic exercises and would benefit from continued PT      Plan: Continue per plan of care  Precautions: s/p ORIF left distal femur DOS 3/21/22 , Hx L patella replacement 2016  Per ortho visit on 5/3/22: ROM exercises, may progress to strengthening  WBAT LLE        Access Code: [facility]     Manuals 9/6 8/4 8/8 8/11 8/15 8/18 8/25 8/26 8/29 9/2   L knee PROM/mobs supine 4'  PROM 4' 4' 4' 4' 4' 8' 8' PROM 6' 4'   L knee distraction c post mobs     3' 3'       Seated knee flexion stretch     1x10 10" 1x10 10"                    Neuro Re-Ed             Quad set with towel under ankle             Quad set with [employer] set with SLR             Pt education  3' 3' 3' 3' 3' 3'      Sidestepping c band 4x 50' blue 4x 50' blue 4x 50' 4x 50' blue 4x 50' blue 4x 50' blue 4x 50' 4x 50' 4x 50' blue 4x 50' blue   SL ball toss c rebounder 20x yellow fwd/lat ea 20x red 20x red 20x red 25x yellow 25x yellow 25x yellow 25x yellow 25x yellow 20x yellow fwd/lat ea   Side step downs 2x10 8"       2x10 6" 2x10 6" 2x10 8"   Ther Ex             Standing knee flexion       3x10 5# 3x10 5# 3x10 5#    SL heel raises             Prone quad stretch 1x10 10" 1x10 10" 1x10 10" 1x10 10" 1x10 10" 1x10 10" 1x10 10" 1x10 10" 1x10 10" 1x10 10"   Pt education             Upright bike 7'  5' 6' 6' 6' Recumb7' 7' 7' 7' 7'   Wall sits             Leg press 3x10 135# 3x10 115# 3x10 115# 3x10 115# 3x10 115# 3x10 125# 3x10 125# 3x10 125# 3x10 135# 3x10 135#   SL leg press 3x10 85# 3x10 65# 3x10 65# 3x10 65# 3x10 75# 3x10 75# 3x10 75# 3x10 75# 3x10 85# 3x10 85#   Knee flexion stretch on step 2x10  2x10 10" 2x10 2x10 2x10  2x10 2x10 2x10 2x10 2x10   Ther Activity             Side step ups 3x10 10" c airex 3x10 8" c airex  2x10 10" airex 2x10 10" airex 2x10 10" airex 2x10 10" airex 3x10 10" airex 3x10 10" airex 3x10 10" airex   Fwd Step downs 3x10 6" 2x10 6" 3x10 6" 3x10 6" 3x10 6" 3x10 6" 3x10 6" 3x10 6" 3x10 6" 3x10 6"   Goblet pause squats 2x10 3" 15# 2x10 x25 x25 x25 x25 x25 2x10 15# 3" 2x10 15# 3" 2x10 15# 3"   Step ups             STS from 20" box             Split squats 2x10 3" hold ariel 2x10 ea 2x10 2x10 2x10 2x10 2x10 2x10 3" isometric 2x10 3" isometric 2x10 3" isometric   Gait Training                                       Modalities

## 2022-09-08 ENCOUNTER — OFFICE VISIT (OUTPATIENT)
Dept: PHYSICAL THERAPY | Facility: CLINIC | Age: 57
End: 2022-09-08
Payer: COMMERCIAL

## 2022-09-08 DIAGNOSIS — S72.492D OTHER CLOSED FRACTURE OF DISTAL END OF LEFT FEMUR WITH ROUTINE HEALING, SUBSEQUENT ENCOUNTER: Primary | ICD-10-CM

## 2022-09-08 PROCEDURE — 97140 MANUAL THERAPY 1/> REGIONS: CPT | Performed by: PHYSICAL THERAPIST

## 2022-09-08 PROCEDURE — 97110 THERAPEUTIC EXERCISES: CPT | Performed by: PHYSICAL THERAPIST

## 2022-09-08 NOTE — PROGRESS NOTES
DISCHARGE    Today's date: 2022  Patient name: Barbara Ham  : 1965  MRN: [id number]  Referring provider: Nona Marvin MD  Dx:   Encounter Diagnosis     ICD-10-CM    1  Other closed fracture of distal end of left femur with routine healing, subsequent encounter  S72 492D        Start Time: 1100  Stop Time: 1145  Total time in clinic (min): 45 minutes    Subjective: patient reports some improvement but knee is still stiff    Objective: See treatment diary below      Assessment: Tolerated treatment fair  Patient exhibited good technique with therapeutic exercises  At this time, patient has achieved their maximum functional benefit from skilled physical therapy services and will be discharged to their HEP  Patient is in agreement with the plan of care  As a result, patient is discharged from physical therapy  Plan: D/C to HEP     Precautions: s/p ORIF left distal femur DOS 3/21/22 , Hx L patella replacement 2016  Per ortho visit on 5/3/22: ROM exercises, may progress to strengthening  WBAT LLE        Access Code: [facility]     Manuals 9/6 9/8 8/8 8/11 8/15 8/18 8/25 8/26 8/29 9/2   L knee PROM/mobs supine 4'  PROM 4' 4' 4' 4' 4' 8' 8' PROM 6' 4'   L knee distraction c post mobs     3' 3'       Seated knee flexion stretch     1x10 10" 1x10 10"                    Neuro Re-Ed             Quad set with towel under ankle             Quad set with [employer] set with SLR             Pt education  3' 3' 3' 3' 3' 3'      Sidestepping c band 4x 50' blue 4x 50' blue 4x 50' 4x 50' blue 4x 50' blue 4x 50' blue 4x 50' 4x 50' 4x 50' blue 4x 50' blue   SL ball toss c rebounder 20x yellow fwd/lat ea 20x red 20x red 20x red 25x yellow 25x yellow 25x yellow 25x yellow 25x yellow 20x yellow fwd/lat ea   Side step downs 2x10 8" 2x10  8"      2x10 6" 2x10 6" 2x10 8"   Ther Ex             Standing knee flexion       3x10 5# 3x10 5# 3x10 5#    SL heel raises             Prone quad stretch 1x10 10" 1x10 10" 1x10 10" 1x10 10" 1x10 10" 1x10 10" 1x10 10" 1x10 10" 1x10 10" 1x10 10"   Pt education             Upright bike 7'  5' 6' 6' 6' Recumb7' 7' 7' 7' 7'   Wall sits             Leg press 3x10 135# 3x10 125# 3x10 115# 3x10 115# 3x10 115# 3x10 125# 3x10 125# 3x10 125# 3x10 135# 3x10 135#   SL leg press 3x10 85# 3x10 85# 3x10 65# 3x10 65# 3x10 75# 3x10 75# 3x10 75# 3x10 75# 3x10 85# 3x10 85#   Knee flexion stretch on step 2x10  2x10 10" 2x10 2x10 2x10  2x10 2x10 2x10 2x10 2x10   Ther Activity             Side step ups 3x10 10" c airex 3x10 8" c airex  2x10 10" airex 2x10 10" airex 2x10 10" airex 2x10 10" airex 3x10 10" airex 3x10 10" airex 3x10 10" airex   Fwd Step downs 3x10 6" 2x10 6" 3x10 6" 3x10 6" 3x10 6" 3x10 6" 3x10 6" 3x10 6" 3x10 6" 3x10 6"   Goblet pause squats 2x10 3" 15# 2x10 x25 x25 x25 x25 x25 2x10 15# 3" 2x10 15# 3" 2x10 15# 3"   Step ups             STS from 20" box             Split squats 2x10 3" hold ariel 2x10 ea 2x10 2x10 2x10 2x10 2x10 2x10 3" isometric 2x10 3" isometric 2x10 3" isometric   Gait Training                                       Modalities

## 2022-09-13 ENCOUNTER — OFFICE VISIT (OUTPATIENT)
Dept: OBGYN CLINIC | Facility: CLINIC | Age: 57
End: 2022-09-13
Payer: COMMERCIAL

## 2022-09-13 ENCOUNTER — APPOINTMENT (OUTPATIENT)
Dept: RADIOLOGY | Facility: CLINIC | Age: 57
End: 2022-09-13
Payer: COMMERCIAL

## 2022-09-13 ENCOUNTER — APPOINTMENT (OUTPATIENT)
Dept: PHYSICAL THERAPY | Facility: CLINIC | Age: 57
End: 2022-09-13
Payer: COMMERCIAL

## 2022-09-13 VITALS
HEART RATE: 64 BPM | SYSTOLIC BLOOD PRESSURE: 165 MMHG | WEIGHT: 136.6 LBS | HEIGHT: 64 IN | BODY MASS INDEX: 23.32 KG/M2 | DIASTOLIC BLOOD PRESSURE: 93 MMHG

## 2022-09-13 DIAGNOSIS — S72.492A OTHER CLOSED FRACTURE OF DISTAL END OF LEFT FEMUR, INITIAL ENCOUNTER (HCC): ICD-10-CM

## 2022-09-13 DIAGNOSIS — Z87.81 S/P ORIF (OPEN REDUCTION INTERNAL FIXATION) FRACTURE: ICD-10-CM

## 2022-09-13 DIAGNOSIS — Z98.890 S/P ORIF (OPEN REDUCTION INTERNAL FIXATION) FRACTURE: ICD-10-CM

## 2022-09-13 DIAGNOSIS — Z87.81 S/P ORIF (OPEN REDUCTION INTERNAL FIXATION) FRACTURE: Primary | ICD-10-CM

## 2022-09-13 DIAGNOSIS — Z98.890 S/P ORIF (OPEN REDUCTION INTERNAL FIXATION) FRACTURE: Primary | ICD-10-CM

## 2022-09-13 PROCEDURE — 73560 X-RAY EXAM OF KNEE 1 OR 2: CPT

## 2022-09-13 PROCEDURE — 99213 OFFICE O/P EST LOW 20 MIN: CPT | Performed by: ORTHOPAEDIC SURGERY

## 2022-09-13 NOTE — PROGRESS NOTES
Orthopaedics Office Visit - Follow up Patient Visit    ASSESSMENT/PLAN:    Assessment:   Aprox  6 months s/p ORIF left distal femur Type B fracture, DOS 3/21/22  Continued pain and stiffness  Hypersensitivity over scar  Mild left knee osteoarthritis      Preop extensor lag present from patellofemoral arthroplasty  Patient denies existence this pre-op however has mentioned it repeatedly in previous visits    Plan:   · X-rays were performed in the office and reviewed   · We discussed a left knee CSI, which would be an infection risk as she does have a patella replacement   · We also discussed a left knee MRI vs referral to a joint replacement specialist, Dr David Monahan   · She may continue therapy for quad strengthening/VMO exercises but we also discussed stopping formal therapy to see if this helps with pain, PT script was provided   · Mobic was prescribed and sent to her pharmacy electronically, to be taken once a day with food    · Follow up in 6 weeks time for re-evaluation, if she is still having pain a referral will be provided to Dr David Moanhan    To Do Next Visit:  Re-evaluation     _____________________________________________________  CHIEF COMPLAINT:  Chief Complaint   Patient presents with    Left Knee - Follow-up         SUBJECTIVE:  Shantal Samson is a 61 y o  female who presents to the office Aprox  6 months s/p ORIF left distal femur Type B fracture, DOS 3/21/22  Mark Ding is attending PT  She notes her flexion has progressed  She notes continued pain to the anterior aspect of her knee  She denies any clicking, catching or locking  She notes walking down steps will cause her increased pain  She will take Aleve as needed for pain control  She is also on 600 MG of Gabapentin, which does help her sleep at night       PAST MEDICAL HISTORY:  Past Medical History:   Diagnosis Date    Arthritis     Migraines        PAST SURGICAL HISTORY:  Past Surgical History:   Procedure Laterality Date    CHOLECYSTECTOMY  GALLBLADDER SURGERY      JOINT REPLACEMENT      PATELLA SURGERY  2016    Patella replacement on left knee    [place] OPEN TX FEMORAL FRACTURE DISTAL MED/LAT CONDYLE Left 3/21/2022    Procedure: OPEN REDUCTION W/ INTERNAL FIXATION (ORIF) LEFT DISTAL FEMUR FRACTURE;  Surgeon: Severino Mathew MD;  Location: [facility];  Service: Orthopedics    REPLACEMENT TOTAL KNEE Left     TONSILECTOMY AND ADNOIDECTOMY  1971    Tonsilectomy only    TONSILLECTOMY      TUBAL LIGATION  1989       FAMILY HISTORY:  Family History   Problem Relation Age of Onset    Dementia Mother     Alzheimer's disease Mother     Kidney disease Father     Hypertension Father     No Known Problems Sister     Hypertension Sister     Hyperlipidemia Sister     No Known Problems Brother     Heart attack Maternal Grandmother     Parkinsonism Maternal Grandfather     No Known Problems Paternal Grandmother     No Known Problems Paternal Grandfather     No Known Problems Brother     Scoliosis Son     Scoliosis Son     Anxiety disorder Son     Depression Son        SOCIAL HISTORY:  Social History     Tobacco Use    Smoking status: Never Smoker    Smokeless tobacco: Never Used   Vaping Use    Vaping Use: Never used   Substance Use Topics    Alcohol use: Never     Alcohol/week: 0 0 standard drinks    Drug use: Never       MEDICATIONS:    Current Outpatient Medications:     Ascorbic Acid (VITAMIN C ADULT GUMMIES PO), Take 500 mg by mouth daily, Disp: , Rfl:     cholecalciferol (VITAMIN D3) 400 units tablet, Take 400 Units by mouth daily, Disp: , Rfl:     Diclofenac Sodium (VOLTAREN) 1 %, Apply 2 g topically 4 (four) times a day as needed (pain), Disp: 350 g, Rfl: 0    gabapentin (Neurontin) 600 MG tablet, Take 1 tablet (600 mg total) by mouth 3 (three) times a day, Disp: 90 tablet, Rfl: 0    Multiple Vitamins-Minerals (MULTIVITAMIN GUMMIES ADULT PO), Take by mouth daily, Disp: , Rfl:     naproxen sodium (ALEVE) 220 MG tablet, Take 220 mg by mouth once as needed , Disp: , Rfl:     vitamin E, tocopherol, 1,000 units capsule, Take 1,000 Units by mouth daily, Disp: , Rfl:     ALLERGIES:  No Known Allergies    REVIEW OF SYSTEMS:  MSK: as noted in HPI  Neuro: WNL  Pertinent items are otherwise noted in HPI  A comprehensive review of systems was otherwise negative  LABS:  HgA1c: No results found for: HGBA1C  BMP:   Lab Results   Component Value Date    CALCIUM 8 4 03/22/2022    K 3 9 03/22/2022    CO2 30 03/22/2022     03/22/2022    BUN 14 03/22/2022    CREATININE 0 70 03/22/2022     CBC: No components found for: CBC    _____________________________________________________  PHYSICAL EXAMINATION:  Vital signs: /93   Pulse 64   Ht 5' 4" (1 626 m)   Wt 62 kg (136 lb 9 6 oz)   BMI 23 45 kg/m²   General: No acute distress, awake and alert  Psychiatric: Mood and affect appear appropriate  HEENT: Trachea Midline, No torticollis, no apparent facial trauma  Cardiovascular: No audible murmurs; Extremities appear perfused  Pulmonary: No audible wheezing or stridor  Skin: No open lesions; see further details (if any) below    MUSCULOSKELETAL EXAMINATION:    Extremities:  Left knee   No erythema, ecchymosis or edema  Well healed surgical incision   Mild lateral joint line tenderness  Mild posterior medial tenderness   Near full extension   Flexion flexion to 110 degrees   Ambulates without assistance   Extremity appears warm and well perfused     _____________________________________________________  STUDIES REVIEWED:  I personally reviewed the images and interpretation is as follows:  X-rays of the left knee obtained in the office today demonstrate stable hardware fixation of distal femur       PROCEDURES PERFORMED:  Procedures      Scribe Attestation    I,:  Darryle Filippo am acting as a scribe while in the presence of the attending physician :       I,:  Kalpana Duron MD personally performed the services described in this documentation    as scribed in my presence :

## 2022-09-14 PROBLEM — Z87.81 S/P ORIF (OPEN REDUCTION INTERNAL FIXATION) FRACTURE: Status: ACTIVE | Noted: 2022-09-14

## 2022-09-14 PROBLEM — Z98.890 S/P ORIF (OPEN REDUCTION INTERNAL FIXATION) FRACTURE: Status: ACTIVE | Noted: 2022-09-14

## 2022-09-14 RX ORDER — MELOXICAM 15 MG/1
15 TABLET ORAL DAILY
Qty: 30 TABLET | Refills: 0 | Status: SHIPPED | OUTPATIENT
Start: 2022-09-14 | End: 2022-10-25 | Stop reason: SDUPTHER

## 2022-09-15 ENCOUNTER — APPOINTMENT (OUTPATIENT)
Dept: PHYSICAL THERAPY | Facility: CLINIC | Age: 57
End: 2022-09-15
Payer: COMMERCIAL

## 2022-09-20 ENCOUNTER — APPOINTMENT (OUTPATIENT)
Dept: PHYSICAL THERAPY | Facility: CLINIC | Age: 57
End: 2022-09-20
Payer: COMMERCIAL

## 2022-09-22 ENCOUNTER — APPOINTMENT (OUTPATIENT)
Dept: PHYSICAL THERAPY | Facility: CLINIC | Age: 57
End: 2022-09-22
Payer: COMMERCIAL

## 2022-09-27 ENCOUNTER — APPOINTMENT (OUTPATIENT)
Dept: PHYSICAL THERAPY | Facility: CLINIC | Age: 57
End: 2022-09-27
Payer: COMMERCIAL

## 2022-09-29 ENCOUNTER — APPOINTMENT (OUTPATIENT)
Dept: PHYSICAL THERAPY | Facility: CLINIC | Age: 57
End: 2022-09-29
Payer: COMMERCIAL

## 2022-09-29 NOTE — PROGRESS NOTES
Pain Medicine Follow-Up Note    Assessment:  1  Chronic pain syndrome    2  Other closed fracture of distal end of left femur, initial encounter ([facility] [address] )    3  Chronic pain of left knee    4  Closed fracture of distal end of left femur, unspecified fracture morphology, sequela        Plan:  New Medications Ordered This Visit   Medications    gabapentin (Neurontin) 600 MG tablet     Sig: Take 1 tablet (600 mg total) by mouth 3 (three) times a day     Dispense:  90 tablet     Refill:  0     My impressions and treatment recommendations were discussed in detail with the patient who verbalized understanding and had no further questions  The patient is reporting significant mental fog with the use of gabapentin  At this point, I encouraged her to trial taking 300 mg in the morning, 600 mg in the afternoon, and 600 mg at night to see if a lower dosage of the gabapentin would help her symptoms of mental fog  The patient was agreeable to try this  If she continues to have mental fog with this lower dose, after 1 week's time, I encouraged her to trial taking gabapentin 300 mg in the morning, 300 mg in the afternoon, and 600 mg at night  I will follow-up with her in 4 weeks to see how she is doing on the dosage of the gabapentin and whether the lower dosage and through her mental fog  Discharge instructions were provided  I personally saw and examined the patient and I agree with the above discussed plan of care  History of Present Illness:    Vinton Spurling is a 61 y o  female who presents to [facility] for interval re-evaluation of the above stated pain complaints  The patient has a past medical and chronic pain history as outlined in the assessment section  She was last seen on September 6, 2022  At today's office visit, the patient's pain score is 4/10 on the verbal numerical pain rating scale    The patient states that her symptoms are worse in the evening and intermittent in nature  She reports the quality of her pain as dull/aching, and pressure-like  She states that her symptoms are primarily over her left knee  She is doing very well on gabapentin 600 mg 3 times daily, but is noticing mental fog and is wondering if something can be done regarding her mental fog issues  Other than as stated above, the patient denies any interval changes in medications, medical condition, mental condition, symptoms, or allergies since the last office visit  Review of Systems:    Review of Systems   Respiratory: Negative for shortness of breath  Cardiovascular: Negative for chest pain  Gastrointestinal: Negative for constipation, diarrhea, nausea and vomiting  Musculoskeletal: Negative for arthralgias, gait problem, joint swelling and myalgias  Skin: Negative for rash  Neurological: Positive for dizziness  Negative for seizures and weakness  All other systems reviewed and are negative          Patient Active Problem List   Diagnosis    Intractable migraine without aura and without status migrainosus    Closed fracture of left distal femur (HCC)    Postoperative hypoxia    Postoperative pain    Migraine    Chronic pain syndrome    Chronic pain of left knee    S/P ORIF (open reduction internal fixation) fracture       Past Medical History:   Diagnosis Date    Arthritis     Migraines        Past Surgical History:   Procedure Laterality Date    CHOLECYSTECTOMY      GALLBLADDER SURGERY      JOINT REPLACEMENT      PATELLA SURGERY  2016    Patella replacement on left knee    [place] OPEN TX FEMORAL FRACTURE DISTAL MED/LAT CONDYLE Left 3/21/2022    Procedure: OPEN REDUCTION W/ INTERNAL FIXATION (ORIF) LEFT DISTAL FEMUR FRACTURE;  Surgeon: Alesia Miramontes MD;  Location: [facility];  Service: Orthopedics    REPLACEMENT TOTAL KNEE Left     TONSILECTOMY AND ADNOIDECTOMY  1971    Tonsilectomy only    TONSILLECTOMY      TUBAL LIGATION  1989       Family History   Problem Relation Age of Onset    Dementia Mother     Alzheimer's disease Mother     Kidney disease Father     Hypertension Father     No Known Problems Sister     Hypertension Sister     Hyperlipidemia Sister     No Known Problems Brother     Heart attack Maternal Grandmother     Parkinsonism Maternal Grandfather     No Known Problems Paternal Grandmother     No Known Problems Paternal Grandfather     No Known Problems Brother     Scoliosis Son     Scoliosis Son     Anxiety disorder Son     Depression Son        Social History     Occupational History    Not on file   Tobacco Use    Smoking status: Never Smoker    Smokeless tobacco: Never Used   Vaping Use    Vaping Use: Never used   Substance and Sexual Activity    Alcohol use: Never     Alcohol/week: 0 0 standard drinks    Drug use: Never    Sexual activity: Not on file         Current Outpatient Medications:     Ascorbic Acid (VITAMIN C ADULT GUMMIES PO), Take 500 mg by mouth daily, Disp: , Rfl:     cholecalciferol (VITAMIN D3) 400 units tablet, Take 400 Units by mouth daily, Disp: , Rfl:     Diclofenac Sodium (VOLTAREN) 1 %, Apply 2 g topically 4 (four) times a day as needed (pain), Disp: 350 g, Rfl: 0    gabapentin (Neurontin) 600 MG tablet, Take 1 tablet (600 mg total) by mouth 3 (three) times a day, Disp: 90 tablet, Rfl: 0    meloxicam (Mobic) 15 mg tablet, Take 1 tablet (15 mg total) by mouth daily, Disp: 30 tablet, Rfl: 0    Multiple Vitamins-Minerals (MULTIVITAMIN GUMMIES ADULT PO), Take by mouth daily, Disp: , Rfl:     naproxen sodium (ALEVE) 220 MG tablet, Take 220 mg by mouth once as needed , Disp: , Rfl:     vitamin E, tocopherol, 1,000 units capsule, Take 1,000 Units by mouth daily, Disp: , Rfl:     No Known Allergies    Physical Exam:    /94 (BP Location: Left arm, Patient Position: Sitting, Cuff Size: Standard)   Pulse 73   Ht 5' 4" (1 626 m)   Wt 62 3 kg (137 lb 6 4 oz)   BMI 23 58 kg/m²     Constitutional:normal, well developed, well nourished, alert, in no distress and non-toxic and no overt pain behavior    Eyes:anicteric  HEENT:grossly intact  Neck:supple, symmetric, trachea midline and no masses   Pulmonary:even and unlabored  Cardiovascular:No edema or pitting edema present  Skin:Normal without rashes or lesions and well hydrated  Psychiatric:Mood and affect appropriate  Neurologic:Cranial Nerves II-XII grossly intact  Musculoskeletal:normal

## 2022-10-04 ENCOUNTER — OFFICE VISIT (OUTPATIENT)
Dept: PAIN MEDICINE | Facility: CLINIC | Age: 57
End: 2022-10-04
Payer: COMMERCIAL

## 2022-10-04 VITALS
WEIGHT: 137.4 LBS | SYSTOLIC BLOOD PRESSURE: 155 MMHG | BODY MASS INDEX: 23.46 KG/M2 | HEIGHT: 64 IN | DIASTOLIC BLOOD PRESSURE: 94 MMHG | HEART RATE: 73 BPM

## 2022-10-04 DIAGNOSIS — G89.4 CHRONIC PAIN SYNDROME: Primary | ICD-10-CM

## 2022-10-04 DIAGNOSIS — G89.29 CHRONIC PAIN OF LEFT KNEE: ICD-10-CM

## 2022-10-04 DIAGNOSIS — S72.492A OTHER CLOSED FRACTURE OF DISTAL END OF LEFT FEMUR, INITIAL ENCOUNTER (HCC): ICD-10-CM

## 2022-10-04 DIAGNOSIS — S72.402S CLOSED FRACTURE OF DISTAL END OF LEFT FEMUR, UNSPECIFIED FRACTURE MORPHOLOGY, SEQUELA: ICD-10-CM

## 2022-10-04 DIAGNOSIS — M25.562 CHRONIC PAIN OF LEFT KNEE: ICD-10-CM

## 2022-10-04 PROCEDURE — 99214 OFFICE O/P EST MOD 30 MIN: CPT | Performed by: ANESTHESIOLOGY

## 2022-10-04 RX ORDER — GABAPENTIN 600 MG/1
600 TABLET ORAL 3 TIMES DAILY
Qty: 90 TABLET | Refills: 0 | Status: SHIPPED | OUTPATIENT
Start: 2022-10-04

## 2022-10-25 ENCOUNTER — OFFICE VISIT (OUTPATIENT)
Dept: OBGYN CLINIC | Facility: CLINIC | Age: 57
End: 2022-10-25
Payer: COMMERCIAL

## 2022-10-25 VITALS
BODY MASS INDEX: 23.31 KG/M2 | WEIGHT: 136.5 LBS | SYSTOLIC BLOOD PRESSURE: 142 MMHG | HEIGHT: 64 IN | HEART RATE: 62 BPM | DIASTOLIC BLOOD PRESSURE: 86 MMHG

## 2022-10-25 DIAGNOSIS — Z98.890 S/P ORIF (OPEN REDUCTION INTERNAL FIXATION) FRACTURE: Primary | ICD-10-CM

## 2022-10-25 DIAGNOSIS — Z87.81 S/P ORIF (OPEN REDUCTION INTERNAL FIXATION) FRACTURE: Primary | ICD-10-CM

## 2022-10-25 DIAGNOSIS — S72.492A OTHER CLOSED FRACTURE OF DISTAL END OF LEFT FEMUR, INITIAL ENCOUNTER (HCC): ICD-10-CM

## 2022-10-25 PROCEDURE — 99213 OFFICE O/P EST LOW 20 MIN: CPT | Performed by: ORTHOPAEDIC SURGERY

## 2022-10-25 RX ORDER — MELOXICAM 15 MG/1
15 TABLET ORAL DAILY
Qty: 30 TABLET | Refills: 1 | Status: SHIPPED | OUTPATIENT
Start: 2022-10-25

## 2022-10-25 NOTE — PROGRESS NOTES
Orthopaedics Office Visit - Follow up Patient Visit    ASSESSMENT/PLAN:    Assessment:   7 months s/p ORIF left distal femur Type B fracture, DOS 3/21/22  Continued pain and stiffness but improving - largely patellofemoral tracking/crepitus type symptoms  Hypersensitivity over scar  Mild left knee osteoarthritis     Preop extensor lag present from patellofemoral arthroplasty  Patient denies existence this pre-op however has mentioned it repeatedly in previous visits    Plan:   · Refill of Mobic was prescribed and sent to her pharmacy electronically   · Advised to continue VMO exercises and quad strengthening exercises   · She was shown a trupoll knee brace, to be worn as needed for stability   · We discussed a referral to one of my colleagues, Dr Eris Styles to discuss a total knee replacement   · Follow up in 2-3 months time for re-evaluation and repeat left knee x-rays     To Do Next Visit:  Re-evaluation, x-rays left knee      _____________________________________________________  CHIEF COMPLAINT:  Chief Complaint   Patient presents with   • Left Knee - Follow-up         SUBJECTIVE:  Issa Coe is a 61 y o  female who presents to the office aprox  7 months s/p ORIF left distal femur Type B fracture, DOS 3/21/22  Overall Myna Guardian is doing well  She notes her symptoms have improved aprox  75-80 percent since her last visit  She completed formal PT and has transitioned to a HEP  She notes most of her pain is to the lateral aspect of her knee as well as to her knee cap  She notes increased grinding  She finished the Mobic  She is using Voltaren Gel on an as needed basis       PAST MEDICAL HISTORY:  Past Medical History:   Diagnosis Date   • Arthritis    • Migraines        PAST SURGICAL HISTORY:  Past Surgical History:   Procedure Laterality Date   • CHOLECYSTECTOMY     • GALLBLADDER SURGERY     • JOINT REPLACEMENT     • PATELLA SURGERY  2016    Patella replacement on left knee   • CT OPEN TX FEMORAL FRACTURE DISTAL MED/LAT CONDYLE Left 3/21/2022    Procedure: OPEN REDUCTION W/ INTERNAL FIXATION (ORIF) LEFT DISTAL FEMUR FRACTURE;  Surgeon: Rama Pete MD;  Location: [facility];  Service: Orthopedics   • REPLACEMENT TOTAL KNEE Left    • TONSILECTOMY AND ADNOIDECTOMY  1971    Tonsilectomy only   • TONSILLECTOMY     • TUBAL LIGATION  1989       FAMILY HISTORY:  Family History   Problem Relation Age of Onset   • Dementia Mother    • Alzheimer's disease Mother    • Kidney disease Father    • Hypertension Father    • No Known Problems Sister    • Hypertension Sister    • Hyperlipidemia Sister    • No Known Problems Brother    • Heart attack Maternal Grandmother    • Parkinsonism Maternal Grandfather    • No Known Problems Paternal Grandmother    • No Known Problems Paternal Grandfather    • No Known Problems Brother    • Scoliosis Son    • Scoliosis Son    • Anxiety disorder Son    • Depression Son        SOCIAL HISTORY:  Social History     Tobacco Use   • Smoking status: Never Smoker   • Smokeless tobacco: Never Used   Vaping Use   • Vaping Use: Never used   Substance Use Topics   • Alcohol use: Never     Alcohol/week: 0 0 standard drinks   • Drug use: Never       MEDICATIONS:    Current Outpatient Medications:   •  Ascorbic Acid (VITAMIN C ADULT GUMMIES PO), Take 500 mg by mouth daily, Disp: , Rfl:   •  cholecalciferol (VITAMIN D3) 400 units tablet, Take 400 Units by mouth daily, Disp: , Rfl:   •  Diclofenac Sodium (VOLTAREN) 1 %, Apply 2 g topically 4 (four) times a day as needed (pain), Disp: 350 g, Rfl: 0  •  gabapentin (Neurontin) 600 MG tablet, Take 1 tablet (600 mg total) by mouth 3 (three) times a day, Disp: 90 tablet, Rfl: 0  •  meloxicam (Mobic) 15 mg tablet, Take 1 tablet (15 mg total) by mouth daily, Disp: 30 tablet, Rfl: 0  •  Multiple Vitamins-Minerals (MULTIVITAMIN GUMMIES ADULT PO), Take by mouth daily, Disp: , Rfl:   •  naproxen sodium (ALEVE) 220 MG tablet, Take 220 mg by mouth once as needed , Disp: , Rfl: •  vitamin E, tocopherol, 1,000 units capsule, Take 1,000 Units by mouth daily, Disp: , Rfl:     ALLERGIES:  No Known Allergies    REVIEW OF SYSTEMS:  MSK: as noted in HPI  Neuro: WNL  Pertinent items are otherwise noted in HPI  A comprehensive review of systems was otherwise negative  LABS:  HgA1c: No results found for: HGBA1C  BMP:   Lab Results   Component Value Date    CALCIUM 8 4 03/22/2022    K 3 9 03/22/2022    CO2 30 03/22/2022     03/22/2022    BUN 14 03/22/2022    CREATININE 0 70 03/22/2022     CBC: No components found for: CBC    _____________________________________________________  PHYSICAL EXAMINATION:  Vital signs: /86   Pulse 62   Ht 5' 4" (1 626 m)   Wt 61 9 kg (136 lb 8 oz)   BMI 23 43 kg/m²   General: No acute distress, awake and alert  Psychiatric: Mood and affect appear appropriate  HEENT: Trachea Midline, No torticollis, no apparent facial trauma  Cardiovascular: No audible murmurs; Extremities appear perfused  Pulmonary: No audible wheezing or stridor  Skin: No open lesions; see further details (if any) below    MUSCULOSKELETAL EXAMINATION:    Extremities:  Left knee     No erythema, ecchymosis or edema  Well healed surgical incision   Extensor lag   Flexion to aprox  110 degrees  Lateral joint line tenderness    Quad atrophy/weakness noted   Ambulates without assistance   Extremity appears warm and well perfused     _____________________________________________________  STUDIES REVIEWED:  I personally reviewed the images and interpretation is as follows:   No new imaging to review       PROCEDURES PERFORMED:  Procedures      Scribe Attestation    I,:  Dara Sorto am acting as a scribe while in the presence of the attending physician :       I,:  Marisa Barney MD personally performed the services described in this documentation    as scribed in my presence :

## 2022-10-28 NOTE — PROGRESS NOTES
Pain Medicine Follow-Up Note    Assessment:  1  Chronic pain syndrome    2  Chronic pain of left knee    3  Closed fracture of distal end of left femur, unspecified fracture morphology, sequela        Plan:  New Medications Ordered This Visit   Medications   • pregabalin (LYRICA) 75 mg capsule     Sig: Take 1 capsule (75 mg total) by mouth 2 (two) times a day     Dispense:  60 capsule     Refill:  0     My impressions and treatment recommendations were discussed in detail with the patient who verbalized understanding and had no further questions  The patient is reporting that she continues to have mental fogging to some degree even with the reduced dose of gabapentin we had trialed her on in the previous office visit  At this point, I did feel it reasonable to discontinue the gabapentin and trial her on Lyrica 75 mg twice daily  Side effects were reviewed with the patient  Follow-up is planned in 4 weeks time or sooner as warranted  Discharge instructions were provided  I personally saw and examined the patient and I agree with the above discussed plan of care  History of Present Illness:    Awais Javier is a 61 y o  female who presents to [facility] for interval re-evaluation of the above stated pain complaints  The patient has a past medical and chronic pain history as outlined in the assessment section  She was last seen on October 4, 2022  At today's office visit, the patient's pain score is 8/10 on the verbal numerical pain rating scale  The patient states that her symptoms are primarily involving her left knee  She describes her pain as worse at night and occasional in nature  She reports the quality of her pain as burning, cramping, pressure-like, numbness, and pins and needles  She is reporting 85% relief of symptoms with gabapentin 300 mg in the morning, 300 mg in the afternoon, and 600 mg at night    However, she reports the side effect of mental fogging and does state that even with the reduced dose of the gabapentin, she is having mental fogging  Other than as stated above, the patient denies any interval changes in medications, medical condition, mental condition, symptoms, or allergies since the last office visit  Review of Systems:    Review of Systems   Respiratory: Negative for shortness of breath  Cardiovascular: Negative for chest pain  Gastrointestinal: Negative for constipation, diarrhea, nausea and vomiting  Musculoskeletal: Negative for arthralgias, gait problem, joint swelling and myalgias  Skin: Negative for rash  Neurological: Positive for dizziness  Negative for seizures and weakness  All other systems reviewed and are negative          Patient Active Problem List   Diagnosis   • Intractable migraine without aura and without status migrainosus   • Closed fracture of left distal femur (HCC)   • Postoperative hypoxia   • Postoperative pain   • Migraine   • Chronic pain syndrome   • Chronic pain of left knee   • S/P ORIF (open reduction internal fixation) fracture       Past Medical History:   Diagnosis Date   • Arthritis    • Migraines        Past Surgical History:   Procedure Laterality Date   • CHOLECYSTECTOMY     • GALLBLADDER SURGERY     • JOINT REPLACEMENT     • PATELLA SURGERY  2016    Patella replacement on left knee   • OR OPEN TX FEMORAL FRACTURE DISTAL MED/LAT CONDYLE Left 3/21/2022    Procedure: OPEN REDUCTION W/ INTERNAL FIXATION (ORIF) LEFT DISTAL FEMUR FRACTURE;  Surgeon: Eliana Snider MD;  Location: [facility] OR;  Service: Orthopedics   • REPLACEMENT TOTAL KNEE Left    • TONSILECTOMY AND ADNOIDECTOMY  1971    Tonsilectomy only   • TONSILLECTOMY     • TUBAL LIGATION  1989       Family History   Problem Relation Age of Onset   • Dementia Mother    • Alzheimer's disease Mother    • Kidney disease Father    • Hypertension Father    • No Known Problems Sister    • Hypertension Sister    • Hyperlipidemia Sister    • No Known Problems Brother    • Heart attack Maternal Grandmother    • Parkinsonism Maternal Grandfather    • No Known Problems Paternal Grandmother    • No Known Problems Paternal Grandfather    • No Known Problems Brother    • Scoliosis Son    • Scoliosis Son    • Anxiety disorder Son    • Depression Son        Social History     Occupational History   • Not on file   Tobacco Use   • Smoking status: Never Smoker   • Smokeless tobacco: Never Used   Vaping Use   • Vaping Use: Never used   Substance and Sexual Activity   • Alcohol use: Never     Alcohol/week: 0 0 standard drinks   • Drug use: Never   • Sexual activity: Not on file         Current Outpatient Medications:   •  Ascorbic Acid (VITAMIN C ADULT GUMMIES PO), Take 500 mg by mouth daily, Disp: , Rfl:   •  cholecalciferol (VITAMIN D3) 400 units tablet, Take 400 Units by mouth daily, Disp: , Rfl:   •  Diclofenac Sodium (VOLTAREN) 1 %, Apply 2 g topically 4 (four) times a day as needed (pain), Disp: 350 g, Rfl: 0  •  meloxicam (Mobic) 15 mg tablet, Take 1 tablet (15 mg total) by mouth daily, Disp: 30 tablet, Rfl: 1  •  Multiple Vitamins-Minerals (MULTIVITAMIN GUMMIES ADULT PO), Take by mouth daily, Disp: , Rfl:   •  naproxen sodium (ALEVE) 220 MG tablet, Take 220 mg by mouth once as needed , Disp: , Rfl:   •  pregabalin (LYRICA) 75 mg capsule, Take 1 capsule (75 mg total) by mouth 2 (two) times a day, Disp: 60 capsule, Rfl: 0  •  vitamin E, tocopherol, 1,000 units capsule, Take 1,000 Units by mouth daily, Disp: , Rfl:     No Known Allergies    Physical Exam:    /78 (BP Location: Left arm, Patient Position: Sitting, Cuff Size: Standard)   Pulse 77   Ht 5' 4" (1 626 m)   Wt 62 kg (136 lb 9 6 oz)   BMI 23 45 kg/m²     Constitutional:normal, well developed, well nourished, alert, in no distress and non-toxic and no overt pain behavior    Eyes:anicteric  HEENT:grossly intact  Neck:supple, symmetric, trachea midline and no masses   Pulmonary:even and unlabored  Cardiovascular:No edema or pitting edema present  Skin:Normal without rashes or lesions and well hydrated  Psychiatric:Mood and affect appropriate  Neurologic:Cranial Nerves II-XII grossly intact  Musculoskeletal:normal

## 2022-11-01 ENCOUNTER — OFFICE VISIT (OUTPATIENT)
Dept: PAIN MEDICINE | Facility: CLINIC | Age: 57
End: 2022-11-01

## 2022-11-01 VITALS
DIASTOLIC BLOOD PRESSURE: 78 MMHG | SYSTOLIC BLOOD PRESSURE: 142 MMHG | HEART RATE: 77 BPM | WEIGHT: 136.6 LBS | HEIGHT: 64 IN | BODY MASS INDEX: 23.32 KG/M2

## 2022-11-01 DIAGNOSIS — M25.562 CHRONIC PAIN OF LEFT KNEE: ICD-10-CM

## 2022-11-01 DIAGNOSIS — G89.29 CHRONIC PAIN OF LEFT KNEE: ICD-10-CM

## 2022-11-01 DIAGNOSIS — S72.402S CLOSED FRACTURE OF DISTAL END OF LEFT FEMUR, UNSPECIFIED FRACTURE MORPHOLOGY, SEQUELA: ICD-10-CM

## 2022-11-01 DIAGNOSIS — G89.4 CHRONIC PAIN SYNDROME: Primary | ICD-10-CM

## 2022-11-01 RX ORDER — PREGABALIN 75 MG/1
75 CAPSULE ORAL 2 TIMES DAILY
Qty: 60 CAPSULE | Refills: 0 | Status: SHIPPED | OUTPATIENT
Start: 2022-11-01

## 2022-12-05 NOTE — PROGRESS NOTES
Pain Medicine Follow-Up Note    Assessment:  1  Chronic pain syndrome    2  Other closed fracture of distal end of left femur, initial encounter ([facility] [address] )    3  Closed fracture of distal end of left femur, unspecified fracture morphology, sequela    4  Chronic pain of left knee        Plan:  No orders of the defined types were placed in this encounter  New Medications Ordered This Visit   Medications   • pregabalin (LYRICA) 75 mg capsule     Sig: Take 1 capsule (75 mg total) by mouth 2 (two) times a day     Dispense:  60 capsule     Refill:  5     My impressions and treatment recommendations were discussed in detail with the patient who verbalized understanding and had no further questions  Patient is reporting much better pain relief with Lyrica as opposed to gabapentin  She states that her symptoms are overall better controlled and she does not have the mental fogging with Lyrica that she had with gabapentin  She is very satisfied with her pain relief at this time  As such, I felt it reasonable to continue the patient on Lyrica 75 mg twice daily  Side effects were reviewed with the patient  Follow-up is planned in 6 months time or sooner as warranted  Discharge instructions were provided  I personally saw and examined the patient and I agree with the above discussed plan of care  History of Present Illness:    Cyril Guadalupe is a 61 y o  female who presents to [facility] for interval re-evaluation of the above stated pain complaints  The patient has a past medical and chronic pain history as outlined in the assessment section  She was last seen on November 1, 2022  At today's office visit, the patient's pain score is 4 out of 10 on the verbal numerical pain rating scale  The patient states that her pain is primarily involving her left knee  She reports that her pain is worse at night    Her pain is intermittent in nature and she reports the Cherry pain is burning  She is reporting that Lyrica 75 mg twice daily is working much better for her than gabapentin was  She does not have the mental fogging and she is experiencing very good pain relief at this time  At this point, she would like to continue her medication as prescribed  She denies any side effects of this medication  Other than as stated above, the patient denies any interval changes in medications, medical condition, mental condition, symptoms, or allergies since the last office visit  Review of Systems:    Review of Systems   Respiratory: Negative for shortness of breath  Cardiovascular: Negative for chest pain  Gastrointestinal: Negative for constipation, diarrhea, nausea and vomiting  Musculoskeletal: Positive for gait problem  Negative for arthralgias, joint swelling and myalgias  Skin: Negative for rash  Neurological: Negative for dizziness, seizures and weakness  All other systems reviewed and are negative          Past Medical History:   Diagnosis Date   • Arthritis    • Migraines        Past Surgical History:   Procedure Laterality Date   • CHOLECYSTECTOMY     • GALLBLADDER SURGERY     • JOINT REPLACEMENT     • PATELLA SURGERY  2016    Patella replacement on left knee   • [place] OPEN TX FEMORAL FRACTURE DISTAL MED/LAT CONDYLE Left 3/21/2022    Procedure: OPEN REDUCTION W/ INTERNAL FIXATION (ORIF) LEFT DISTAL FEMUR FRACTURE;  Surgeon: Nancy De La Garza MD;  Location: [facility];  Service: Orthopedics   • REPLACEMENT TOTAL KNEE Left    • TONSILECTOMY AND ADNOIDECTOMY  1971    Tonsilectomy only   • TONSILLECTOMY     • TUBAL LIGATION  1989       Family History   Problem Relation Age of Onset   • Dementia Mother    • Alzheimer's disease Mother    • Kidney disease Father    • Hypertension Father    • No Known Problems Sister    • Hypertension Sister    • Hyperlipidemia Sister    • No Known Problems Brother    • Heart attack Maternal Grandmother    • Parkinsonism Maternal Grandfather • No Known Problems Paternal Grandmother    • No Known Problems Paternal Grandfather    • No Known Problems Brother    • Scoliosis Son    • Scoliosis Son    • Anxiety disorder Son    • Depression Son        Social History     Occupational History   • Not on file   Tobacco Use   • Smoking status: Never   • Smokeless tobacco: Never   Vaping Use   • Vaping Use: Never used   Substance and Sexual Activity   • Alcohol use: Never     Alcohol/week: 0 0 standard drinks   • Drug use: Never   • Sexual activity: Not on file         Current Outpatient Medications:   •  Ascorbic Acid (VITAMIN C ADULT GUMMIES PO), Take 500 mg by mouth daily, Disp: , Rfl:   •  cholecalciferol (VITAMIN D3) 400 units tablet, Take 400 Units by mouth daily, Disp: , Rfl:   •  Diclofenac Sodium (VOLTAREN) 1 %, Apply 2 g topically 4 (four) times a day as needed (pain), Disp: 350 g, Rfl: 0  •  meloxicam (Mobic) 15 mg tablet, Take 1 tablet (15 mg total) by mouth daily, Disp: 30 tablet, Rfl: 1  •  Multiple Vitamins-Minerals (MULTIVITAMIN GUMMIES ADULT PO), Take by mouth daily, Disp: , Rfl:   •  naproxen sodium (ALEVE) 220 MG tablet, Take 220 mg by mouth once as needed , Disp: , Rfl:   •  pregabalin (LYRICA) 75 mg capsule, Take 1 capsule (75 mg total) by mouth 2 (two) times a day, Disp: 60 capsule, Rfl: 5  •  vitamin E, tocopherol, 1,000 units capsule, Take 1,000 Units by mouth daily, Disp: , Rfl:     No Known Allergies    Physical Exam:    /87 (BP Location: Left arm, Patient Position: Sitting, Cuff Size: Standard)   Pulse 65   Ht 5' 4" (1 626 m)   Wt 61 1 kg (134 lb 12 8 oz)   BMI 23 14 kg/m²     Constitutional:normal, well developed, well nourished, alert, in no distress and non-toxic and no overt pain behavior    Eyes:anicteric  HEENT:grossly intact  Neck:supple, symmetric, trachea midline and no masses   Pulmonary:even and unlabored  Cardiovascular:No edema or pitting edema present  Skin:Normal without rashes or lesions and well hydrated  Psychiatric:Mood and affect appropriate  Neurologic:Cranial Nerves II-XII grossly intact  Musculoskeletal:normal

## 2022-12-06 ENCOUNTER — OFFICE VISIT (OUTPATIENT)
Dept: PAIN MEDICINE | Facility: CLINIC | Age: 57
End: 2022-12-06

## 2022-12-06 VITALS
BODY MASS INDEX: 23.01 KG/M2 | HEIGHT: 64 IN | DIASTOLIC BLOOD PRESSURE: 87 MMHG | SYSTOLIC BLOOD PRESSURE: 136 MMHG | WEIGHT: 134.8 LBS | HEART RATE: 65 BPM

## 2022-12-06 DIAGNOSIS — G89.29 CHRONIC PAIN OF LEFT KNEE: ICD-10-CM

## 2022-12-06 DIAGNOSIS — S72.492A OTHER CLOSED FRACTURE OF DISTAL END OF LEFT FEMUR, INITIAL ENCOUNTER (HCC): ICD-10-CM

## 2022-12-06 DIAGNOSIS — M25.562 CHRONIC PAIN OF LEFT KNEE: ICD-10-CM

## 2022-12-06 DIAGNOSIS — G89.4 CHRONIC PAIN SYNDROME: Primary | ICD-10-CM

## 2022-12-06 DIAGNOSIS — S72.402S CLOSED FRACTURE OF DISTAL END OF LEFT FEMUR, UNSPECIFIED FRACTURE MORPHOLOGY, SEQUELA: ICD-10-CM

## 2022-12-06 RX ORDER — PREGABALIN 75 MG/1
75 CAPSULE ORAL 2 TIMES DAILY
Qty: 60 CAPSULE | Refills: 5 | Status: SHIPPED | OUTPATIENT
Start: 2022-12-06

## 2022-12-21 ENCOUNTER — APPOINTMENT (OUTPATIENT)
Dept: LAB | Facility: CLINIC | Age: 57
End: 2022-12-21

## 2022-12-21 ENCOUNTER — OFFICE VISIT (OUTPATIENT)
Dept: INTERNAL MEDICINE CLINIC | Facility: CLINIC | Age: 57
End: 2022-12-21

## 2022-12-21 VITALS
WEIGHT: 136 LBS | HEART RATE: 60 BPM | SYSTOLIC BLOOD PRESSURE: 118 MMHG | TEMPERATURE: 98 F | BODY MASS INDEX: 23.22 KG/M2 | HEIGHT: 64 IN | RESPIRATION RATE: 20 BRPM | OXYGEN SATURATION: 96 % | DIASTOLIC BLOOD PRESSURE: 72 MMHG

## 2022-12-21 DIAGNOSIS — N30.90 CYSTITIS: Primary | ICD-10-CM

## 2022-12-21 DIAGNOSIS — N30.90 CYSTITIS: ICD-10-CM

## 2022-12-21 DIAGNOSIS — S72.402S CLOSED FRACTURE OF DISTAL END OF LEFT FEMUR, UNSPECIFIED FRACTURE MORPHOLOGY, SEQUELA: ICD-10-CM

## 2022-12-21 LAB
BACTERIA UR QL AUTO: ABNORMAL /HPF
BILIRUB UR QL STRIP: NEGATIVE
CLARITY UR: CLEAR
COLOR UR: COLORLESS
GLUCOSE UR STRIP-MCNC: NEGATIVE MG/DL
HGB UR QL STRIP.AUTO: ABNORMAL
KETONES UR STRIP-MCNC: NEGATIVE MG/DL
LEUKOCYTE ESTERASE UR QL STRIP: ABNORMAL
NITRITE UR QL STRIP: NEGATIVE
NON-SQ EPI CELLS URNS QL MICRO: ABNORMAL /HPF
PH UR STRIP.AUTO: 6.5 [PH]
PROT UR STRIP-MCNC: NEGATIVE MG/DL
RBC #/AREA URNS AUTO: ABNORMAL /HPF
SP GR UR STRIP.AUTO: 1.01 (ref 1–1.03)
UROBILINOGEN UR STRIP-ACNC: <2 MG/DL
WBC #/AREA URNS AUTO: ABNORMAL /HPF

## 2022-12-21 RX ORDER — CEPHALEXIN 500 MG/1
500 CAPSULE ORAL EVERY 12 HOURS SCHEDULED
Qty: 14 CAPSULE | Refills: 0 | Status: SHIPPED | OUTPATIENT
Start: 2022-12-21 | End: 2022-12-28

## 2022-12-21 RX ORDER — PHENAZOPYRIDINE HYDROCHLORIDE 100 MG/1
100 TABLET, FILM COATED ORAL 3 TIMES DAILY PRN
Qty: 10 TABLET | Refills: 0 | Status: SHIPPED | OUTPATIENT
Start: 2022-12-21

## 2022-12-21 NOTE — PATIENT INSTRUCTIONS
Urinary Tract Infection in Women   AMBULATORY CARE:   A urinary tract infection (UTI)  is caused by bacteria that get inside your urinary tract  Most bacteria that enter your urinary tract come out when you urinate  If the bacteria stay in your urinary tract, you may get an infection  Your urinary tract includes your kidneys, ureters, bladder, and urethra  Urine is made in your kidneys, and it flows from the ureters to the bladder  Urine leaves the bladder through the urethra  A UTI is more common in your lower urinary tract, which includes your bladder and urethra  Common symptoms include the following:   Urinating more often or waking from sleep to urinate    Pain or burning when you urinate    Pain or pressure in your lower abdomen     Urine that smells bad    Blood in your urine    Leaking urine    Seek care immediately if:   You are urinating very little or not at all  You have a high fever with shaking chills  You have side or back pain that gets worse  Call your doctor if:   You have a fever  You do not feel better after 2 days of taking antibiotics  You are vomiting  You have questions or concerns about your condition or care  Treatment for a UTI  may include antibiotics to treat a bacterial infection  You may also need medicines to decrease pain and burning, or decrease the urge to urinate often  If you have UTIs often (called recurrent UTIs), you may be given antibiotics to take regularly  You will be given directions for when and how to use antibiotics  The goal is to prevent UTIs but not cause antibiotic resistance by using antibiotics too often  Prevent a UTI:   Empty your bladder often  Urinate and empty your bladder as soon as you feel the need  Do not hold your urine for long periods of time  Wipe from front to back after you urinate or have a bowel movement  This will help prevent germs from getting into your urinary tract through your urethra      Drink liquids as directed  Ask how much liquid to drink each day and which liquids are best for you  You may need to drink more liquids than usual to help flush out the bacteria  Do not drink alcohol, caffeine, or citrus juices  These can irritate your bladder and increase your symptoms  Your healthcare provider may recommend cranberry juice to help prevent a UTI  Urinate after you have sex  This can help flush out bacteria passed during sex  Do not douche or use feminine deodorants  These can change the chemical balance in your vagina  Change sanitary pads or tampons often  This will help prevent germs from getting into your urinary tract  Talk to your healthcare provider about your birth control method  You may need to change your method if it is increasing your risk for UTIs  Wear cotton underwear and clothes that are loose  Tight pants and nylon underwear can trap moisture and cause bacteria to grow  Vaginal estrogen may be recommended  This medicine helps prevent UTIs in women who have gone through menopause or are in joy-menopause  Do pelvic muscle exercises often  Pelvic muscle exercises may help you start and stop urinating  Strong pelvic muscles may help you empty your bladder easier  Squeeze these muscles tightly for 5 seconds like you are trying to hold back urine  Then relax for 5 seconds  Gradually work up to squeezing for 10 seconds  Do 3 sets of 15 repetitions a day, or as directed  Follow up with your doctor as directed:  Write down your questions so you remember to ask them during your visits  © Copyright [employer] 2022 Information is for End User's use only and may not be sold, redistributed or otherwise used for commercial purposes  All illustrations and images included in CareNotes® are the copyrighted property of [employer]  or Kris Chacko  The above information is an  only   It is not intended as medical advice for individual conditions or treatments  Talk to your doctor, nurse or pharmacist before following any medical regimen to see if it is safe and effective for you

## 2022-12-22 LAB — BACTERIA UR CULT: NORMAL

## 2022-12-23 LAB — BACTERIA UR CULT: NORMAL

## 2022-12-28 NOTE — PROGRESS NOTES
[facility] [address] [place] [address]    NAME: Ariella Wetzel  AGE: 61 y o  SEX: female  : 1965     DATE: 2022     Assessment and Plan:     1  Cystitis  1 day history of UTI symptoms including suprapubic pressure, urgency, dysuria, and hematuria  No CVA tenderness  Patient is afebrile  Will obtain UA and culture  Will start Keflex 500 mg twice daily x1 week  Patient counseled to increase her water intake  May use Pyridium for dysuria  Counseled that if she develops fever, nausea or vomiting, flank pain to seek further evaluation   - UA w Reflex to Microscopic w Reflex to Culture -Lab Collect; Future  - Urine culture; Future  - cephalexin (KEFLEX) 500 mg capsule; Take 1 capsule (500 mg total) by mouth every 12 (twelve) hours for 7 days  Dispense: 14 capsule; Refill: 0  - phenazopyridine (PYRIDIUM) 100 mg tablet; Take 1 tablet (100 mg total) by mouth 3 (three) times a day as needed for bladder spasms  Dispense: 10 tablet; Refill: 0    2  Closed fracture of distal end of left femur, unspecified fracture morphology, sequela    - DXA bone density spine hip and pelvis; Future        Return if symptoms worsen or fail to improve, for Next scheduled follow up  Chief Complaint:     Chief Complaint   Patient presents with   • Urinary Tract Infection     Trace blood in the urine since late evening        History of Present Illness:     Inez Portillo presents to the office today for evaluation of UTI symptoms that began yesterday  She reports mild dysuria and urgency  She noted some blood with urination last night  Denies fever or chills  Denies flank or lower back pain  Reports some suprapubic pressure  Denies nausea or vomiting  Review of Systems:     Review of Systems   Constitutional: Negative for chills and fever  HENT: Negative  Respiratory: Negative  Cardiovascular: Negative  Gastrointestinal: Negative  Negative for nausea     Genitourinary: Positive for dysuria, hematuria and urgency  Problem List:     Patient Active Problem List   Diagnosis   • Intractable migraine without aura and without status migrainosus   • Closed fracture of left distal femur (HCC)   • Postoperative hypoxia   • Postoperative pain   • Migraine   • Chronic pain syndrome   • Chronic pain of left knee   • S/P ORIF (open reduction internal fixation) fracture        Objective:     /72 (BP Location: Left arm, Patient Position: Sitting, Cuff Size: Large)   Pulse 60   Temp 98 °F (36 7 °C) (Tympanic)   Resp 20   Ht 5' 4" (1 626 m)   Wt 61 7 kg (136 lb)   SpO2 96%   BMI 23 34 kg/m²     Physical Exam  Constitutional:       General: She is not in acute distress  Appearance: She is normal weight  HENT:      Head: Normocephalic and atraumatic  Right Ear: External ear normal       Left Ear: External ear normal       Nose: Nose normal       Mouth/Throat:      Mouth: Mucous membranes are moist       Pharynx: Oropharynx is clear  Eyes:      Conjunctiva/sclera: Conjunctivae normal    Cardiovascular:      Rate and Rhythm: Normal rate and regular rhythm  Pulses: Normal pulses  Heart sounds: Normal heart sounds  No murmur heard  Pulmonary:      Effort: Pulmonary effort is normal       Breath sounds: Normal breath sounds  No wheezing  Abdominal:      General: Bowel sounds are normal  There is no distension  Palpations: Abdomen is soft  Tenderness: There is no abdominal tenderness  There is no right CVA tenderness or left CVA tenderness  Musculoskeletal:         General: Normal range of motion  Cervical back: Neck supple  Skin:     General: Skin is warm and dry  Capillary Refill: Capillary refill takes less than 2 seconds  Neurological:      Mental Status: She is alert and oriented to person, place, and time  Psychiatric:         Mood and Affect: Mood normal          Behavior: Behavior normal          Thought Content:  Thought content normal  Judgment: Judgment normal          I spent 15 minutes with this patient      [address] [place] [address]  [facility]

## 2023-01-26 DIAGNOSIS — S72.492A OTHER CLOSED FRACTURE OF DISTAL END OF LEFT FEMUR, INITIAL ENCOUNTER (HCC): Primary | ICD-10-CM

## 2023-01-31 ENCOUNTER — APPOINTMENT (OUTPATIENT)
Dept: RADIOLOGY | Facility: CLINIC | Age: 58
End: 2023-01-31

## 2023-01-31 ENCOUNTER — OFFICE VISIT (OUTPATIENT)
Dept: OBGYN CLINIC | Facility: CLINIC | Age: 58
End: 2023-01-31

## 2023-01-31 VITALS
WEIGHT: 134.6 LBS | HEIGHT: 64 IN | BODY MASS INDEX: 22.98 KG/M2 | HEART RATE: 71 BPM | DIASTOLIC BLOOD PRESSURE: 93 MMHG | SYSTOLIC BLOOD PRESSURE: 155 MMHG

## 2023-01-31 DIAGNOSIS — S72.492D OTHER CLOSED FRACTURE OF DISTAL END OF LEFT FEMUR WITH ROUTINE HEALING, SUBSEQUENT ENCOUNTER: ICD-10-CM

## 2023-01-31 DIAGNOSIS — Z98.890 S/P ORIF (OPEN REDUCTION INTERNAL FIXATION) FRACTURE: Primary | ICD-10-CM

## 2023-01-31 DIAGNOSIS — S72.492A OTHER CLOSED FRACTURE OF DISTAL END OF LEFT FEMUR, INITIAL ENCOUNTER (HCC): ICD-10-CM

## 2023-01-31 DIAGNOSIS — Z87.81 S/P ORIF (OPEN REDUCTION INTERNAL FIXATION) FRACTURE: Primary | ICD-10-CM

## 2023-01-31 NOTE — PROGRESS NOTES
Orthopaedics Office Visit - Follow up Patient Visit    ASSESSMENT/PLAN:    Assessment:   10 months s/p ORIF left distal femur Type B fracture, DOS 3/21/22  Continued pain and stiffness but improving - largely patellofemoral tracking/crepitus type symptoms  Hypersensitivity over scar  Mild left knee osteoarthritis      Preop extensor lag present from patellofemoral arthroplasty, improved  Patient denies existence this pre-op however has mentioned it repeatedly in previous visits    Plan:   · X-rays were performed in the office and reviewed  · We discussed that she will likely develop arthritic changes in the future, she does have mild arthritic changes at this time  · We discussed a referral to one of my [facility] specialist [facility], Dr Angelique Merida for possible discussion of a total knee if symptoms worsen or fail to improve   · Follow up in the office as needed if symptoms worsen or fail to improve      To Do Next Visit:  Re-evaluation     _____________________________________________________  CHIEF COMPLAINT:  Chief Complaint   Patient presents with   • Left Knee - Follow-up         SUBJECTIVE:  Nicolette Graf is a 61 y o  female who presents to the office aprox  10 months s/p ORIF left distal femur Type B fracture, DOS 3/21/22  Overall Melba Aguilar is doing well  She notes occasional pain to the anterior lateral aspect of her left knee with certain knee positions  She completed formal therapy  She is treating with Pain Management and is currently taking Lyrica        PAST MEDICAL HISTORY:  Past Medical History:   Diagnosis Date   • Arthritis    • Migraines        PAST SURGICAL HISTORY:  Past Surgical History:   Procedure Laterality Date   • CHOLECYSTECTOMY     • GALLBLADDER SURGERY     • JOINT REPLACEMENT     • PATELLA SURGERY  2016    Patella replacement on left knee   • AL OPEN TX FEMORAL FRACTURE DISTAL MED/LAT CONDYLE Left 3/21/2022    Procedure: OPEN REDUCTION W/ INTERNAL FIXATION (ORIF) LEFT DISTAL FEMUR FRACTURE; Surgeon: Sonia Ross MD;  Location: [facility] OR;  Service: Orthopedics   • REPLACEMENT TOTAL KNEE Left    • TONSILECTOMY AND ADNOIDECTOMY  1971    Tonsilectomy only   • TONSILLECTOMY     • TUBAL LIGATION  1989       FAMILY HISTORY:  Family History   Problem Relation Age of Onset   • Dementia Mother    • Alzheimer's disease Mother    • Kidney disease Father    • Hypertension Father    • No Known Problems Sister    • Hypertension Sister    • Hyperlipidemia Sister    • No Known Problems Brother    • Heart attack Maternal Grandmother    • Parkinsonism Maternal Grandfather    • No Known Problems Paternal Grandmother    • No Known Problems Paternal Grandfather    • No Known Problems Brother    • Scoliosis Son    • Scoliosis Son    • Anxiety disorder Son    • Depression Son        SOCIAL HISTORY:  Social History     Tobacco Use   • Smoking status: Never   • Smokeless tobacco: Never   Vaping Use   • Vaping Use: Never used   Substance Use Topics   • Alcohol use: Never     Alcohol/week: 0 0 standard drinks   • Drug use: Never       MEDICATIONS:    Current Outpatient Medications:   •  Ascorbic Acid (VITAMIN C ADULT GUMMIES PO), Take 500 mg by mouth daily, Disp: , Rfl:   •  cholecalciferol (VITAMIN D3) 400 units tablet, Take 400 Units by mouth daily, Disp: , Rfl:   •  meloxicam (Mobic) 15 mg tablet, Take 1 tablet (15 mg total) by mouth daily, Disp: 30 tablet, Rfl: 1  •  Multiple Vitamins-Minerals (MULTIVITAMIN GUMMIES ADULT PO), Take by mouth daily, Disp: , Rfl:   •  naproxen sodium (ALEVE) 220 MG tablet, Take 220 mg by mouth once as needed , Disp: , Rfl:   •  phenazopyridine (PYRIDIUM) 100 mg tablet, Take 1 tablet (100 mg total) by mouth 3 (three) times a day as needed for bladder spasms, Disp: 10 tablet, Rfl: 0  •  pregabalin (LYRICA) 75 mg capsule, Take 1 capsule (75 mg total) by mouth 2 (two) times a day, Disp: 60 capsule, Rfl: 5  •  vitamin E, tocopherol, 1,000 units capsule, Take 1,000 Units by mouth daily, Disp: , Rfl:   •  Diclofenac Sodium (VOLTAREN) 1 %, Apply 2 g topically 4 (four) times a day as needed (pain) (Patient not taking: Reported on 12/21/2022), Disp: 350 g, Rfl: 0    ALLERGIES:  No Known Allergies    REVIEW OF SYSTEMS:  MSK: as noted in HPI  Neuro: WNL  Pertinent items are otherwise noted in HPI  A comprehensive review of systems was otherwise negative  LABS:  HgA1c: No results found for: HGBA1C  BMP:   Lab Results   Component Value Date    CALCIUM 8 4 03/22/2022    K 3 9 03/22/2022    CO2 30 03/22/2022     03/22/2022    BUN 14 03/22/2022    CREATININE 0 70 03/22/2022     CBC: No components found for: CBC    _____________________________________________________  PHYSICAL EXAMINATION:  Vital signs: /93   Pulse 71   Ht 5' 4" (1 626 m)   Wt 61 1 kg (134 lb 9 6 oz)   BMI 23 10 kg/m²   General: No acute distress, awake and alert  Psychiatric: Mood and affect appear appropriate  HEENT: Trachea Midline, No torticollis, no apparent facial trauma  Cardiovascular: No audible murmurs; Extremities appear perfused  Pulmonary: No audible wheezing or stridor  Skin: No open lesions; see further details (if any) below    MUSCULOSKELETAL EXAMINATION:    Extremities:  Left knee     No erythema, ecchymosis or edema  Well healed surgical incision   Anterior lateral tenderness over osteophyte  ROM 0-110 degrees   Ambulates without assistance   Extremity appears warm and well perfused       _____________________________________________________  STUDIES REVIEWED:  I personally reviewed the images and interpretation is as follows:  X-ray left knee demonstrate healed left distal femur Type B fracture with orthopedic hardware intact         PROCEDURES PERFORMED:  Procedures    Scribe Attestation    I,:  Chico Hardin am acting as a scribe while in the presence of the attending physician :       I,:  Jeannette Vang MD personally performed the services described in this documentation    as scribed in my presence :

## 2023-02-08 ENCOUNTER — HOSPITAL ENCOUNTER (OUTPATIENT)
Dept: BONE DENSITY | Facility: CLINIC | Age: 58
Discharge: HOME | End: 2023-02-08

## 2023-02-08 ENCOUNTER — TELEPHONE (OUTPATIENT)
Dept: INTERNAL MEDICINE CLINIC | Facility: CLINIC | Age: 58
End: 2023-02-08

## 2023-02-08 DIAGNOSIS — S72.402S CLOSED FRACTURE OF DISTAL END OF LEFT FEMUR, UNSPECIFIED FRACTURE MORPHOLOGY, SEQUELA: ICD-10-CM

## 2023-02-08 NOTE — TELEPHONE ENCOUNTER
----- Message from Chevy Weldon, [address] sent at 2/8/2023  2:42 PM EST -----  Results of bone density show low bone loss (osteopenia) to left hip   Make sure to add in a calcium supplement to your daily medications to decrease progression   Also low weight bearing exercises are recommended to help strengthen bone- swimming, weights, yoga

## 2023-03-14 ENCOUNTER — OFFICE VISIT (OUTPATIENT)
Dept: INTERNAL MEDICINE CLINIC | Facility: CLINIC | Age: 58
End: 2023-03-14

## 2023-03-14 VITALS
DIASTOLIC BLOOD PRESSURE: 80 MMHG | TEMPERATURE: 97.7 F | SYSTOLIC BLOOD PRESSURE: 138 MMHG | OXYGEN SATURATION: 93 % | WEIGHT: 135 LBS | HEART RATE: 68 BPM | BODY MASS INDEX: 23.17 KG/M2 | RESPIRATION RATE: 18 BRPM

## 2023-03-14 DIAGNOSIS — G43.019 INTRACTABLE MIGRAINE WITHOUT AURA AND WITHOUT STATUS MIGRAINOSUS: ICD-10-CM

## 2023-03-14 DIAGNOSIS — Z12.31 BREAST CANCER SCREENING BY MAMMOGRAM: ICD-10-CM

## 2023-03-14 DIAGNOSIS — M85.852 OSTEOPENIA OF LEFT HIP: ICD-10-CM

## 2023-03-14 DIAGNOSIS — Z00.00 ANNUAL PHYSICAL EXAM: Primary | ICD-10-CM

## 2023-03-14 DIAGNOSIS — R10.13 EPIGASTRIC ABDOMINAL PAIN: ICD-10-CM

## 2023-03-14 RX ORDER — PREDNISOLONE ACETATE 10 MG/ML
SUSPENSION/ DROPS OPHTHALMIC
COMMUNITY
Start: 2023-02-08

## 2023-03-14 RX ORDER — CYCLOSPORINE 0.5 MG/ML
EMULSION OPHTHALMIC
COMMUNITY
Start: 2023-02-23

## 2023-03-14 RX ORDER — PANTOPRAZOLE SODIUM 20 MG/1
20 TABLET, DELAYED RELEASE ORAL
Qty: 30 TABLET | Refills: 1 | Status: SHIPPED | OUTPATIENT
Start: 2023-03-14 | End: 2023-09-10

## 2023-03-14 NOTE — PATIENT INSTRUCTIONS
Start taking Riboflavin 400mg daily to try decrease frequency of migraine   Keep headache diary for the next few weeks  Wellness Visit for Adults   AMBULATORY CARE:   A wellness visit  is when you see your healthcare provider to get screened for health problems  Your healthcare provider will also give you advice on how to stay healthy  Write down your questions so you remember to ask them  Ask your healthcare provider how often you should have a wellness visit  What happens at a wellness visit:  Your healthcare provider will ask about your health, and your family history of health problems  This includes high blood pressure, heart disease, and cancer  He or she will ask if you have symptoms that concern you, if you smoke, and about your mood  You may also be asked about your intake of medicines, supplements, food, and alcohol  Any of the following may be done: Your weight  will be checked  Your height may also be checked so your body mass index (BMI) can be calculated  Your BMI shows if you are at a healthy weight  Your blood pressure  and heart rate will be checked  Your temperature may also be checked  Blood and urine tests  may be done  Blood tests may be done to check your cholesterol levels  Abnormal cholesterol levels increase your risk for heart disease and stroke  You may also need a blood or urine test to check for diabetes if you are at increased risk  Urine tests may be done to look for signs of an infection or kidney disease  A physical exam  includes checking your heartbeat and lungs with a stethoscope  Your healthcare provider may also check your skin to look for sun damage  Screening tests  may be recommended  A screening test is done to check for diseases that may not cause symptoms  The screening tests you may need depend on your age, gender, family history, and lifestyle habits  For example, colorectal screening may be recommended if you are 48years old or older      Screening tests you need if you are a woman:   A Pap smear  is used to screen for cervical cancer  Pap smears are usually done every 3 to 5 years depending on your age  You may need them more often if you have had abnormal Pap smear test results in the past  Ask your healthcare provider how often you should have a Pap smear  A mammogram  is an x-ray of your breasts to screen for breast cancer  Experts recommend mammograms every 2 years starting at age 48 years  You may need a mammogram at age 52 years or younger if you have an increased risk for breast cancer  Talk to your healthcare provider about when you should start having mammograms and how often you need them  Vaccines you may need:   Get an influenza vaccine  every year  The influenza vaccine protects you from the flu  Several types of viruses cause the flu  The viruses change over time, so new vaccines are made each year  Get a tetanus-diphtheria (Td) booster vaccine  every 10 years  This vaccine protects you against tetanus and diphtheria  Tetanus is a severe infection that may cause painful muscle spasms and lockjaw  Diphtheria is a severe bacterial infection that causes a thick covering in the back of your mouth and throat  Get a human papillomavirus (HPV) vaccine  if you are female and aged 23 to 32 or male 23 to 24 and never received it  This vaccine protects you from HPV infection  HPV is the most common infection spread by sexual contact  HPV may also cause vaginal, penile, and anal cancers  Get a pneumococcal vaccine  if you are aged 72 years or older  The pneumococcal vaccine is an injection given to protect you from pneumococcal disease  Pneumococcal disease is an infection caused by pneumococcal bacteria  The infection may cause pneumonia, meningitis, or an ear infection  Get a shingles vaccine  if you are 60 or older, even if you have had shingles before  The shingles vaccine is an injection to protect you from the varicella-zoster virus  This is the same virus that causes chickenpox  Shingles is a painful rash that develops in people who had chickenpox or have been exposed to the virus  How to eat healthy:  My Plate is a model for planning healthy meals  It shows the types and amounts of foods that should go on your plate  Fruits and vegetables make up about half of your plate, and grains and protein make up the other half  A serving of dairy is included on the side of your plate  The amount of calories and serving sizes you need depends on your age, gender, weight, and height  Examples of healthy foods are listed below:  Eat a variety of vegetables  such as dark green, red, and orange vegetables  You can also include canned vegetables low in sodium (salt) and frozen vegetables without added butter or sauces  Eat a variety of fresh fruits , canned fruit in 100% juice, frozen fruit, and dried fruit  Include whole grains  At least half of the grains you eat should be whole grains  Examples include whole-wheat bread, wheat pasta, brown rice, and whole-grain cereals such as oatmeal     Eat a variety of protein foods such as seafood (fish and shellfish), lean meat, and poultry without skin (turkey and chicken)  Examples of lean meats include pork leg, shoulder, or tenderloin, and beef round, sirloin, tenderloin, and extra lean ground beef  Other protein foods include eggs and egg substitutes, beans, peas, soy products, nuts, and seeds  Choose low-fat dairy products such as skim or 1% milk or low-fat yogurt, cheese, and cottage cheese  Limit unhealthy fats  such as butter, hard margarine, and shortening  Exercise:  Exercise at least 30 minutes per day on most days of the week  Some examples of exercise include walking, biking, dancing, and swimming  You can also fit in more physical activity by taking the stairs instead of the elevator or parking farther away from stores  Include muscle strengthening activities 2 days each week  Regular exercise provides many health benefits  It helps you manage your weight, and decreases your risk for type 2 diabetes, heart disease, stroke, and high blood pressure  Exercise can also help improve your mood  Ask your healthcare provider about the best exercise plan for you  General health and safety guidelines:   Do not smoke  Nicotine and other chemicals in cigarettes and cigars can cause lung damage  Ask your healthcare provider for information if you currently smoke and need help to quit  E-cigarettes or smokeless tobacco still contain nicotine  Talk to your healthcare provider before you use these products  Limit alcohol  A drink of alcohol is 12 ounces of beer, 5 ounces of wine, or 1½ ounces of liquor  Lose weight, if needed  Being overweight increases your risk of certain health conditions  These include heart disease, high blood pressure, type 2 diabetes, and certain types of cancer  Protect your skin  Do not sunbathe or use tanning beds  Use sunscreen with a SPF 15 or higher  Apply sunscreen at least 15 minutes before you go outside  Reapply sunscreen every 2 hours  Wear protective clothing, hats, and sunglasses when you are outside  Drive safely  Always wear your seatbelt  Make sure everyone in your car wears a seatbelt  A seatbelt can save your life if you are in an accident  Do not use your cell phone when you are driving  This could distract you and cause an accident  Pull over if you need to make a call or send a text message  Practice safe sex  Use latex condoms if are sexually active and have more than one partner  Your healthcare provider may recommend screening tests for sexually transmitted infections (STIs)  Wear helmets, lifejackets, and protective gear  Always wear a helmet when you ride a bike or motorcycle, go skiing, or play sports that could cause a head injury  Wear protective equipment when you play sports   Wear a lifejacket when you are on a boat or doing water sports  © Copyright [facility] 2022 Information is for End User's use only and may not be sold, redistributed or otherwise used for commercial purposes  The above information is an  only  It is not intended as medical advice for individual conditions or treatments  Talk to your doctor, nurse or pharmacist before following any medical regimen to see if it is safe and effective for you

## 2023-03-14 NOTE — PROGRESS NOTES
ADULT ANNUAL [address]     NAME: Gideon Green  AGE: 61 y o  SEX: female  : 1965     DATE: 3/14/2023     Assessment and Plan:     Problem List Items Addressed This Visit        Cardiovascular and Mediastinum    Intractable migraine without aura and without status migrainosus- continual issue  Refractory to Topamax use in the past  Pt will keep a HA diary for a month to determine possible pattern or triggers  continue prn NSAID  Musculoskeletal and Integument    Osteopenia of left hip- noted on dexa  Recommend calcium, vitamin D and weight bearig exercises  Other Visit Diagnoses     Annual physical exam    -  Primary    Relevant Orders    Comprehensive metabolic panel    CBC and differential    Lipid Panel with Direct LDL reflex    TSH, 3rd generation with Free T4 reflex    Breast cancer screening by mammogram        Relevant Orders    Mammo screening bilateral w 3d & cad    Epigastric abdominal pain    - differential includes PUD, gastritis, and pancreatitis  S/p cholecystectomy  Will trial PPI  If no improvement will f/u with abd u/s  Relevant Medications    pantoprazole (PROTONIX) 20 mg tablet    Other Relevant Orders    Lipase          Immunizations and preventive care screenings were discussed with patient today  Appropriate education was printed on patient's after visit summary  Counseling:  Dental Health: discussed importance of regular tooth brushing, flossing, and dental visits  · Exercise: the importance of regular exercise/physical activity was discussed  Recommend exercise 3-5 times per week for at least 30 minutes  Return in about 6 weeks (around 2023) for Next scheduled follow up       Chief Complaint:     Chief Complaint   Patient presents with   • Physical Exam     Pt is coming in for her yearly physical also having abdomen pain after eating       History of Present Illness:     Adult Annual Physical   Patient here for a comprehensive physical exam  The patient reports abdominal pain  Worse after eating  Within 5-10 mintues will have shapr pain  Mostly in the left side  Onset x 2 weeks  Denies nausea, vomiting or diahrrea  ocassional bloating  denies brbpr or melena  Diet and Physical Activity  · Diet/Nutrition: well balanced diet  · Exercise: walking  Depression Screening  PHQ-2/9 Depression Screening    Little interest or pleasure in doing things: 0 - not at all  Feeling down, depressed, or hopeless: 0 - not at all  PHQ-2 Score: 0  PHQ-2 Interpretation: Negative depression screen       General Health  · Sleep: sleeps well  · Hearing: normal - bilateral   · Vision: wears glasses  · Dental: no dental visits for >1 year  /GYN Health  · Patient is: postmenopausal  · Last mammo- 2 years ago  · Last colon cancer screening - cologaurd in 2021      Review of Systems:     Review of Systems   Respiratory: Negative for shortness of breath  Cardiovascular: Negative for chest pain  Gastrointestinal: Negative for constipation  Musculoskeletal: Positive for arthralgias  Negative for gait problem  Neurological: Positive for headaches  Negative for dizziness        Past Medical History:     Past Medical History:   Diagnosis Date   • Arthritis    • Migraines       Past Surgical History:     Past Surgical History:   Procedure Laterality Date   • CHOLECYSTECTOMY     • GALLBLADDER SURGERY     • JOINT REPLACEMENT     • PATELLA SURGERY  2016    Patella replacement on left knee   • [place] OPEN TX FEMORAL FRACTURE DISTAL MED/LAT CONDYLE Left 3/21/2022    Procedure: OPEN REDUCTION W/ INTERNAL FIXATION (ORIF) LEFT DISTAL FEMUR FRACTURE;  Surgeon: Ann Marie Gaviria MD;  Location: [facility] OR;  Service: Orthopedics   • REPLACEMENT TOTAL KNEE Left    • TONSILECTOMY AND ADNOIDECTOMY  1971    Tonsilectomy only   • TONSILLECTOMY     • TUBAL LIGATION  1989      Social History:     Social History Socioeconomic History   • Marital status:      Spouse name: None   • Number of children: None   • Years of education: None   • Highest education level: None   Occupational History   • None   Tobacco Use   • Smoking status: Never   • Smokeless tobacco: Never   Vaping Use   • Vaping Use: Never used   Substance and Sexual Activity   • Alcohol use: Never     Alcohol/week: 0 0 standard drinks   • Drug use: Never   • Sexual activity: None   Other Topics Concern   • None   Social History Narrative   • None     Social Determinants of Health     Financial Resource Strain: Not on file   Food Insecurity: No Food Insecurity   • Worried About Running Out of Food in the Last Year: Never true   • Ran Out of Food in the Last Year: Never true   Transportation Needs: No Transportation Needs   • Lack of Transportation (Medical): No   • Lack of Transportation (Non-Medical):  No   Physical Activity: Not on file   Stress: Not on file   Social Connections: Not on file   Intimate Partner Violence: Not on file   Housing Stability: Low Risk    • Unable to Pay for Housing in the Last Year: No   • Number of Places Lived in the Last Year: 1   • Unstable Housing in the Last Year: No      Family History:     Family History   Problem Relation Age of Onset   • Dementia Mother    • Alzheimer's disease Mother    • Kidney disease Father    • Hypertension Father    • No Known Problems Sister    • Hypertension Sister    • Hyperlipidemia Sister    • No Known Problems Brother    • Heart attack Maternal Grandmother    • Parkinsonism Maternal Grandfather    • No Known Problems Paternal Grandmother    • No Known Problems Paternal Grandfather    • No Known Problems Brother    • Scoliosis Son    • Scoliosis Son    • Anxiety disorder Son    • Depression Son       Current Medications:     Current Outpatient Medications   Medication Sig Dispense Refill   • Ascorbic Acid (VITAMIN C ADULT GUMMIES PO) Take 500 mg by mouth daily     • cholecalciferol (VITAMIN D3) 400 units tablet Take 400 Units by mouth daily     • meloxicam (Mobic) 15 mg tablet Take 1 tablet (15 mg total) by mouth daily 30 tablet 1   • Multiple Vitamins-Minerals (MULTIVITAMIN GUMMIES ADULT PO) Take by mouth daily     • naproxen sodium (ALEVE) 220 MG tablet Take 220 mg by mouth once as needed      • pantoprazole (PROTONIX) 20 mg tablet Take 1 tablet (20 mg total) by mouth daily before breakfast 30 tablet 1   • phenazopyridine (PYRIDIUM) 100 mg tablet Take 1 tablet (100 mg total) by mouth 3 (three) times a day as needed for bladder spasms 10 tablet 0   • prednisoLONE acetate (PRED FORTE) 1 % ophthalmic suspension      • pregabalin (LYRICA) 75 mg capsule Take 1 capsule (75 mg total) by mouth 2 (two) times a day 60 capsule 5   • Restasis 0 05 % ophthalmic emulsion      • vitamin E, tocopherol, 1,000 units capsule Take 1,000 Units by mouth daily       No current facility-administered medications for this visit  Allergies:     No Known Allergies   Physical Exam:     /80 (BP Location: Left arm, Patient Position: Sitting, Cuff Size: Standard)   Pulse 68   Temp 97 7 °F (36 5 °C) (Temporal)   Resp 18   Wt 61 2 kg (135 lb)   SpO2 93%   BMI 23 17 kg/m²     Physical Exam  HENT:      Head: Normocephalic and atraumatic  Right Ear: External ear normal       Left Ear: External ear normal    Eyes:      Extraocular Movements: Extraocular movements intact  Conjunctiva/sclera: Conjunctivae normal       Pupils: Pupils are equal, round, and reactive to light  Cardiovascular:      Rate and Rhythm: Normal rate and regular rhythm  Heart sounds: No murmur heard  Pulmonary:      Effort: Pulmonary effort is normal       Breath sounds: Normal breath sounds  Abdominal:      General: Bowel sounds are normal       Palpations: Abdomen is soft  Musculoskeletal:      Right lower leg: No edema  Left lower leg: No edema     Neurological:      Mental Status: She is alert and oriented to person, place, and time     Psychiatric:         Mood and Affect: Mood normal          Behavior: Behavior normal           Kay Venegas DO  [facility]

## 2023-04-25 ENCOUNTER — OFFICE VISIT (OUTPATIENT)
Age: 58
End: 2023-04-25

## 2023-04-25 VITALS
TEMPERATURE: 97.8 F | DIASTOLIC BLOOD PRESSURE: 82 MMHG | SYSTOLIC BLOOD PRESSURE: 140 MMHG | RESPIRATION RATE: 18 BRPM | OXYGEN SATURATION: 97 % | WEIGHT: 135 LBS | HEART RATE: 64 BPM | BODY MASS INDEX: 23.17 KG/M2

## 2023-04-25 DIAGNOSIS — G43.019 INTRACTABLE MIGRAINE WITHOUT AURA AND WITHOUT STATUS MIGRAINOSUS: Primary | ICD-10-CM

## 2023-04-25 DIAGNOSIS — K21.9 GASTROESOPHAGEAL REFLUX DISEASE, UNSPECIFIED WHETHER ESOPHAGITIS PRESENT: ICD-10-CM

## 2023-04-25 DIAGNOSIS — Z13.9 SCREENING DUE: Primary | ICD-10-CM

## 2023-04-25 RX ORDER — SUMATRIPTAN 50 MG/1
50 TABLET, FILM COATED ORAL ONCE AS NEEDED
Qty: 10 TABLET | Refills: 5 | Status: SHIPPED | OUTPATIENT
Start: 2023-04-25

## 2023-04-25 NOTE — PROGRESS NOTES
Name: Caden Red      : 1965      MRN: [id number]  Encounter Provider: Joceline Morton DO  Encounter Date: 2023   Encounter department: [address] [place] [address]  Intractable migraine without aura and without status migrainosus- reviewed HA diary  <15 HA days/month  Would forgot prophylactic therapy  Discussed staying away from triggers  Discussed limiting NSAIDs  Will try triptan for abortive therapy  -     SUMAtriptan (IMITREX) 50 mg tablet; Take 1 tablet (50 mg total) by mouth once as needed for migraine for up to 1 dose may repeat in 2 hours if necessary    2  Gastroesophageal reflux disease, unspecified whether esophagitis present- epigastric pain resolved with ppi use  Pt to continue           Subjective      Kept a headache diary  Noted deli meats may be a trigger  Ha javier resolve with two excedrin and an advil   In the past month she had 5 -6 headaches  Taking ppi for epigastic discomfort and it is helping  reviwed labs with pt     Review of Systems   Gastrointestinal: Positive for abdominal pain (resolved )  Neurological: Positive for headaches         Current Outpatient Medications on File Prior to Visit   Medication Sig   • Ascorbic Acid (VITAMIN C ADULT GUMMIES PO) Take 500 mg by mouth daily   • cholecalciferol (VITAMIN D3) 400 units tablet Take 400 Units by mouth daily   • meloxicam (Mobic) 15 mg tablet Take 1 tablet (15 mg total) by mouth daily   • Multiple Vitamins-Minerals (MULTIVITAMIN GUMMIES ADULT PO) Take by mouth daily   • naproxen sodium (ALEVE) 220 MG tablet Take 220 mg by mouth once as needed    • pantoprazole (PROTONIX) 20 mg tablet Take 1 tablet (20 mg total) by mouth daily before breakfast   • phenazopyridine (PYRIDIUM) 100 mg tablet Take 1 tablet (100 mg total) by mouth 3 (three) times a day as needed for bladder spasms   • prednisoLONE acetate (PRED FORTE) 1 % ophthalmic suspension    • pregabalin (LYRICA) 75 mg capsule Take 1 capsule (75 mg total) by mouth 2 (two) times a day   • Restasis 0 05 % ophthalmic emulsion    • vitamin E, tocopherol, 1,000 units capsule Take 1,000 Units by mouth daily       Objective     /82 (BP Location: Right arm, Patient Position: Sitting, Cuff Size: Standard)   Pulse 64   Temp 97 8 °F (36 6 °C) (Temporal)   Resp 18   Wt 61 2 kg (135 lb)   SpO2 97%   BMI 23 17 kg/m²     Physical Exam  HENT:      Head: Normocephalic  Eyes:      Conjunctiva/sclera: Conjunctivae normal    Cardiovascular:      Rate and Rhythm: Normal rate  Pulmonary:      Effort: Pulmonary effort is normal    Neurological:      Mental Status: She is alert and oriented to person, place, and time        Gait: Gait normal    Psychiatric:         Mood and Affect: Mood normal          Behavior: Behavior normal        Peggy Anderson DO

## 2023-05-20 DIAGNOSIS — R10.13 EPIGASTRIC ABDOMINAL PAIN: ICD-10-CM

## 2023-05-21 RX ORDER — PANTOPRAZOLE SODIUM 20 MG/1
TABLET, DELAYED RELEASE ORAL
Qty: 30 TABLET | Refills: 0 | Status: SHIPPED | OUTPATIENT
Start: 2023-05-21

## 2023-06-02 NOTE — PROGRESS NOTES
Pain Medicine Follow-Up Note    Assessment:  1  Chronic pain syndrome    2  Closed fracture of distal end of left femur, unspecified fracture morphology, sequela    3  Chronic pain of left knee        Plan:  My impressions and treatment recommendations were discussed in detail with the patient who verbalized understanding and had no further questions  The patient reports that her pain symptoms have improved considerably with the Lyrica 75 mg twice daily  She has actually weaned herself off of the Lyrica and only uses the Lyrica 1-2 times per week currently  She does report that whenever she uses the Lyrica her pain improves to much more bearable levels  At this point, since her pain is so sporadic and she is not using the Lyrica regularly, there is no need for any further refills  I asked the patient to return to the office if she requires a refill of the Lyrica, but since she is using it so sporadically, I do not think she needs the medication regularly at this time  The patient verbalized understanding  Follow-up is planned on an as-needed basis  Discharge instructions were provided  I personally saw and examined the patient and I agree with the above discussed plan of care  History of Present Illness:    Ada Mora is a 61 y o  female who presents to [facility] for interval re-evaluation of the above stated pain complaints  The patient has a past medical and chronic pain history as outlined in the assessment section  She was last seen on December 6, 2022  At today's office visit, the patient's pain score is 0 out of 10 on the verbal numerical pain rating scale  The patient states that her pain is worse at night and occasional in nature  She reports the quality of her pain as numbness  She is reporting 100% relief of symptoms with the Lyrica 75 mg twice daily  She has actually weaned herself off of the Lyrica and only uses 1 tablet once or twice per week  Denies any side effects of this medication  Other than as stated above, the patient denies any interval changes in medications, medical condition, mental condition, symptoms, or allergies since the last office visit  Review of Systems:    Review of Systems   Respiratory: Negative for shortness of breath  Cardiovascular: Negative for chest pain  Gastrointestinal: Negative for constipation, diarrhea, nausea and vomiting  Musculoskeletal: Negative for arthralgias, gait problem, joint swelling and myalgias  Skin: Negative for rash  Neurological: Negative for dizziness, seizures and weakness  All other systems reviewed and are negative          Past Medical History:   Diagnosis Date   • Arthritis    • Migraines        Past Surgical History:   Procedure Laterality Date   • CHOLECYSTECTOMY     • GALLBLADDER SURGERY     • JOINT REPLACEMENT     • PATELLA SURGERY  2016    Patella replacement on left knee   • [place] OPEN TX FEMORAL FRACTURE DISTAL MED/LAT CONDYLE Left 3/21/2022    Procedure: OPEN REDUCTION W/ INTERNAL FIXATION (ORIF) LEFT DISTAL FEMUR FRACTURE;  Surgeon: Ryland Kulkarni MD;  Location: [facility];  Service: Orthopedics   • REPLACEMENT TOTAL KNEE Left    • TONSILECTOMY AND ADNOIDECTOMY  1971    Tonsilectomy only   • TONSILLECTOMY     • TUBAL LIGATION  1989       Family History   Problem Relation Age of Onset   • Dementia Mother    • Alzheimer's disease Mother    • Kidney disease Father    • Hypertension Father    • No Known Problems Sister    • Hypertension Sister    • Hyperlipidemia Sister    • No Known Problems Brother    • Heart attack Maternal Grandmother    • Parkinsonism Maternal Grandfather    • No Known Problems Paternal Grandmother    • No Known Problems Paternal Grandfather    • No Known Problems Brother    • Scoliosis Son    • Scoliosis Son    • Anxiety disorder Son    • Depression Son        Social History     Occupational History   • Not on file   Tobacco Use   • Smoking status: Never   • Smokeless tobacco: Never   Vaping Use   • Vaping Use: Never used   Substance and Sexual Activity   • Alcohol use: Never     Alcohol/week: 0 0 standard drinks of alcohol   • Drug use: Never   • Sexual activity: Not on file         Current Outpatient Medications:   •  Ascorbic Acid (VITAMIN C ADULT GUMMIES PO), Take 500 mg by mouth daily, Disp: , Rfl:   •  cholecalciferol (VITAMIN D3) 400 units tablet, Take 400 Units by mouth daily, Disp: , Rfl:   •  meloxicam (Mobic) 15 mg tablet, Take 1 tablet (15 mg total) by mouth daily, Disp: 30 tablet, Rfl: 1  •  Multiple Vitamins-Minerals (MULTIVITAMIN GUMMIES ADULT PO), Take by mouth daily, Disp: , Rfl:   •  naproxen sodium (ALEVE) 220 MG tablet, Take 220 mg by mouth once as needed PRN, Disp: , Rfl:   •  pantoprazole (PROTONIX) 20 mg tablet, TAKE ONE TABLET BY MOUTH EVERY DAY before breakfast, Disp: 30 tablet, Rfl: 0  •  pregabalin (LYRICA) 75 mg capsule, Take 1 capsule (75 mg total) by mouth 2 (two) times a day (Patient taking differently: Take 75 mg by mouth 2 (two) times a day PRN), Disp: 60 capsule, Rfl: 5  •  Restasis 0 05 % ophthalmic emulsion, , Disp: , Rfl:   •  SUMAtriptan (IMITREX) 50 mg tablet, Take 1 tablet (50 mg total) by mouth once as needed for migraine for up to 1 dose may repeat in 2 hours if necessary, Disp: 10 tablet, Rfl: 5  •  vitamin E, tocopherol, 1,000 units capsule, Take 1,000 Units by mouth daily, Disp: , Rfl:   •  phenazopyridine (PYRIDIUM) 100 mg tablet, Take 1 tablet (100 mg total) by mouth 3 (three) times a day as needed for bladder spasms (Patient not taking: Reported on 6/6/2023), Disp: 10 tablet, Rfl: 0  •  prednisoLONE acetate (PRED FORTE) 1 % ophthalmic suspension, , Disp: , Rfl:     No Known Allergies    Physical Exam:    /84 (BP Location: Left arm, Patient Position: Sitting, Cuff Size: Standard)   Pulse 77   Wt 61 1 kg (134 lb 12 8 oz)   BMI 23 14 kg/m²     Constitutional:normal, well developed, well nourished, alert, in no distress and non-toxic and no overt pain behavior  Eyes:anicteric  HEENT:grossly intact  Neck:supple, symmetric, trachea midline and no masses   Pulmonary:even and unlabored  Cardiovascular:No edema or pitting edema present  Skin:Normal without rashes or lesions and well hydrated  Psychiatric:Mood and affect appropriate  Neurologic:Cranial Nerves II-XII grossly intact  Musculoskeletal:normal      Imaging      LEFT KNEE  1/31/23     INDICATION:   S72 492A: Other fracture of lower end of left femur, initial encounter for closed fracture      COMPARISON:  9/13/2022     VIEWS:  XR KNEE 1 OR 2 VW LEFT         FINDINGS:     Status post ORIF of the distal femur and evidence of patellofemoral compartment arthroplasty  Orthopedic hardware is intact  No acute periprosthetic fractures      There is no joint effusion      Mild to moderate medial lateral compartment arthrosis  Patellofemoral spurring noted      No lytic or blastic osseous lesion      Soft tissues are unremarkable      IMPRESSION:     Stable alignment of the distal femur status post ORIF  No acute periprosthetic fractures are seen

## 2023-06-06 ENCOUNTER — OFFICE VISIT (OUTPATIENT)
Dept: PAIN MEDICINE | Facility: CLINIC | Age: 58
End: 2023-06-06
Payer: COMMERCIAL

## 2023-06-06 VITALS
WEIGHT: 134.8 LBS | DIASTOLIC BLOOD PRESSURE: 84 MMHG | BODY MASS INDEX: 23.14 KG/M2 | HEART RATE: 77 BPM | SYSTOLIC BLOOD PRESSURE: 156 MMHG

## 2023-06-06 DIAGNOSIS — G89.29 CHRONIC PAIN OF LEFT KNEE: ICD-10-CM

## 2023-06-06 DIAGNOSIS — M25.562 CHRONIC PAIN OF LEFT KNEE: ICD-10-CM

## 2023-06-06 DIAGNOSIS — G89.4 CHRONIC PAIN SYNDROME: Primary | ICD-10-CM

## 2023-06-06 DIAGNOSIS — S72.402S CLOSED FRACTURE OF DISTAL END OF LEFT FEMUR, UNSPECIFIED FRACTURE MORPHOLOGY, SEQUELA: ICD-10-CM

## 2023-06-06 PROCEDURE — 99214 OFFICE O/P EST MOD 30 MIN: CPT | Performed by: ANESTHESIOLOGY

## 2023-06-24 DIAGNOSIS — R10.13 EPIGASTRIC ABDOMINAL PAIN: ICD-10-CM

## 2023-06-24 RX ORDER — PANTOPRAZOLE SODIUM 20 MG/1
TABLET, DELAYED RELEASE ORAL
Qty: 30 TABLET | Refills: 0 | Status: SHIPPED | OUTPATIENT
Start: 2023-06-24

## 2023-07-27 DIAGNOSIS — R10.13 EPIGASTRIC ABDOMINAL PAIN: ICD-10-CM

## 2023-07-28 RX ORDER — PANTOPRAZOLE SODIUM 20 MG/1
TABLET, DELAYED RELEASE ORAL
Qty: 90 TABLET | Refills: 0 | Status: SHIPPED | OUTPATIENT
Start: 2023-07-28

## 2023-08-02 ENCOUNTER — HOSPITAL ENCOUNTER (OUTPATIENT)
Age: 58
Discharge: HOME | End: 2023-08-02
Payer: COMMERCIAL

## 2023-08-02 VITALS — HEIGHT: 64 IN | WEIGHT: 134 LBS | BODY MASS INDEX: 22.88 KG/M2

## 2023-08-02 DIAGNOSIS — Z12.31 BREAST CANCER SCREENING BY MAMMOGRAM: ICD-10-CM

## 2023-08-02 PROCEDURE — 77063 BREAST TOMOSYNTHESIS BI: CPT

## 2023-08-02 PROCEDURE — 77067 SCR MAMMO BI INCL CAD: CPT

## 2023-10-03 ENCOUNTER — HOSPITAL ENCOUNTER (EMERGENCY)
Facility: HOSPITAL | Age: 58
Discharge: HOME | End: 2023-10-03
Attending: EMERGENCY MEDICINE
Payer: COMMERCIAL

## 2023-10-03 VITALS
BODY MASS INDEX: 23 KG/M2 | HEART RATE: 89 BPM | RESPIRATION RATE: 18 BRPM | TEMPERATURE: 97.9 F | SYSTOLIC BLOOD PRESSURE: 168 MMHG | DIASTOLIC BLOOD PRESSURE: 106 MMHG | WEIGHT: 134 LBS | OXYGEN SATURATION: 97 %

## 2023-10-03 DIAGNOSIS — H11.31 SUBCONJUNCTIVAL HEMORRHAGE OF RIGHT EYE: Primary | ICD-10-CM

## 2023-10-03 PROCEDURE — 99282 EMERGENCY DEPT VISIT SF MDM: CPT

## 2023-10-03 PROCEDURE — 99284 EMERGENCY DEPT VISIT MOD MDM: CPT | Performed by: EMERGENCY MEDICINE

## 2023-10-03 RX ORDER — ERYTHROMYCIN 5 MG/G
0.5 OINTMENT OPHTHALMIC ONCE
Status: COMPLETED | OUTPATIENT
Start: 2023-10-03 | End: 2023-10-03

## 2023-10-03 RX ORDER — ERYTHROMYCIN 5 MG/G
OINTMENT OPHTHALMIC EVERY 6 HOURS SCHEDULED
Qty: 3.5 G | Refills: 0 | Status: SHIPPED | OUTPATIENT
Start: 2023-10-03 | End: 2023-10-10

## 2023-10-03 RX ORDER — TETRACAINE HYDROCHLORIDE 5 MG/ML
2 SOLUTION OPHTHALMIC ONCE
Status: COMPLETED | OUTPATIENT
Start: 2023-10-03 | End: 2023-10-03

## 2023-10-03 RX ADMIN — FLUORESCEIN SODIUM 1 STRIP: 1 STRIP OPHTHALMIC at 19:14

## 2023-10-03 RX ADMIN — TETRACAINE HYDROCHLORIDE 2 DROP: 5 SOLUTION OPHTHALMIC at 19:14

## 2023-10-03 RX ADMIN — ERYTHROMYCIN 0.5 INCH: 5 OINTMENT OPHTHALMIC at 19:15

## 2023-10-04 NOTE — ED PROVIDER NOTES
History  Chief Complaint   Patient presents with   • Eye Redness     R eye redness starting yesterday afternoon. C/o pain, no visual changes. History provided by:  Patient  Eye Problem  Location:  Right eye  Quality:  Aching  Severity:  Mild  Onset quality:  Sudden  Duration:  2 days  Timing:  Constant  Progression:  Unchanged  Chronicity:  New  Context: not chemical exposure, not contact lens problem, not direct trauma and not scratch    Relieved by:  Nothing  Worsened by:  Nothing  Ineffective treatments:  None tried  Associated symptoms: no blurred vision, no decreased vision and no discharge    Risk factors: conjunctival hemorrhage    Risk factors: no exposure to pinkeye, no previous injury to eye and no recent URI        Prior to Admission Medications   Prescriptions Last Dose Informant Patient Reported? Taking?    Ascorbic Acid (VITAMIN C ADULT GUMMIES PO)  Self Yes No   Sig: Take 500 mg by mouth daily   Multiple Vitamins-Minerals (MULTIVITAMIN GUMMIES ADULT PO)  Self Yes No   Sig: Take by mouth daily   Restasis 0.05 % ophthalmic emulsion  Self Yes No   SUMAtriptan (IMITREX) 50 mg tablet  Self No No   Sig: Take 1 tablet (50 mg total) by mouth once as needed for migraine for up to 1 dose may repeat in 2 hours if necessary   cholecalciferol (VITAMIN D3) 400 units tablet  Self Yes No   Sig: Take 400 Units by mouth daily   meloxicam (Mobic) 15 mg tablet  Self No No   Sig: Take 1 tablet (15 mg total) by mouth daily   naproxen sodium (ALEVE) 220 MG tablet  Self Yes No   Sig: Take 220 mg by mouth once as needed PRN   pantoprazole (PROTONIX) 20 mg tablet   No No   Sig: TAKE ONE TABLET BY MOUTH EVERY DAY BEFORE BREAKFAST   phenazopyridine (PYRIDIUM) 100 mg tablet  Self No No   Sig: Take 1 tablet (100 mg total) by mouth 3 (three) times a day as needed for bladder spasms   Patient not taking: Reported on 6/6/2023   prednisoLONE acetate (PRED FORTE) 1 % ophthalmic suspension  Self Yes No   Patient not taking: Reported on 6/6/2023   pregabalin (LYRICA) 75 mg capsule  Self No No   Sig: Take 1 capsule (75 mg total) by mouth 2 (two) times a day   Patient taking differently: Take 75 mg by mouth 2 (two) times a day PRN   vitamin E, tocopherol, 1,000 units capsule  Self Yes No   Sig: Take 1,000 Units by mouth daily      Facility-Administered Medications: None       Past Medical History:   Diagnosis Date   • Arthritis    • Migraines        Past Surgical History:   Procedure Laterality Date   • CHOLECYSTECTOMY     • GALLBLADDER SURGERY     • JOINT REPLACEMENT     • PATELLA SURGERY  2016    Patella replacement on left knee   • [place] OPEN TX FEMORAL FRACTURE DISTAL MED/LAT CONDYLE Left 3/21/2022    Procedure: OPEN REDUCTION W/ INTERNAL FIXATION (ORIF) LEFT DISTAL FEMUR FRACTURE;  Surgeon: Lawanda Dc MD;  Location: [facility] [place] [facility];  Service: Orthopedics   • REPLACEMENT TOTAL KNEE Left    • TONSILECTOMY AND ADNOIDECTOMY  1971    Tonsilectomy only   • TONSILLECTOMY     • TUBAL LIGATION  1989       Family History   Problem Relation Age of Onset   • Dementia Mother    • Alzheimer's disease Mother    • Kidney disease Father    • Hypertension Father    • No Known Problems Sister    • Hypertension Sister    • Hyperlipidemia Sister    • Heart attack Maternal Grandmother    • Parkinsonism Maternal Grandfather    • No Known Problems Paternal Grandmother    • No Known Problems Paternal Grandfather    • No Known Problems Brother    • No Known Problems Brother    • Scoliosis Son    • Scoliosis Son    • Anxiety disorder Son    • Depression Son      I have reviewed and agree with the history as documented.     E-Cigarette/Vaping   • E-Cigarette Use Never User      E-Cigarette/Vaping Substances   • Nicotine No    • THC No    • CBD No    • Flavoring No    • Other No    • Unknown No      Social History     Tobacco Use   • Smoking status: Never   • Smokeless tobacco: Never   Vaping Use   • Vaping Use: Never used   Substance Use Topics   • Alcohol use: Never     Alcohol/week: 0.0 standard drinks of alcohol   • Drug use: Never       Review of Systems   Eyes: Negative for blurred vision and discharge. All other systems reviewed and are negative. Physical Exam  Physical Exam  Vitals and nursing note reviewed. Constitutional:       General: She is not in acute distress. Appearance: She is well-developed. She is not diaphoretic. HENT:      Head: Normocephalic and atraumatic. Nose: Nose normal.   Eyes:      General: Lids are normal.      Extraocular Movements: Extraocular movements intact. Right eye: Normal extraocular motion. Conjunctiva/sclera:      Right eye: Right conjunctiva is injected. Hemorrhage present. Left eye: Left conjunctiva is injected. No hemorrhage. Pupils:      Right eye: No fluorescein uptake. Cardiovascular:      Rate and Rhythm: Normal rate and regular rhythm. Heart sounds: Normal heart sounds. Pulmonary:      Effort: Pulmonary effort is normal. No respiratory distress. Breath sounds: Normal breath sounds. Abdominal:      Palpations: Abdomen is soft. Musculoskeletal:         General: Normal range of motion. Cervical back: Normal range of motion and neck supple. Skin:     General: Skin is warm and dry. Neurological:      General: No focal deficit present. Mental Status: She is alert and oriented to person, place, and time. Mental status is at baseline.                 Vital Signs  ED Triage Vitals   Temperature Pulse Respirations Blood Pressure SpO2   10/03/23 1749 10/03/23 1749 10/03/23 1749 10/03/23 1749 10/03/23 1749   97.9 °F (36.6 °C) 89 18 (!) 168/106 97 %      Temp src Heart Rate Source Patient Position - Orthostatic VS BP Location FiO2 (%)   -- 10/03/23 1749 -- -- --    Monitor         Pain Score       10/03/23 1821       3           Vitals:    10/03/23 1749   BP: (!) 168/106   Pulse: 89         Visual Acuity  Visual Acuity    Flowsheet Row Most Recent Value   Visual acuity R eye is 20/50   Visual acuity Left eye is 20/30   Visual acuity in both eyes is 20/30   Wearing corrective eyewear/lenses? No  [Pt took glasses off]   No corrective eyewear/lenses Yes   L Pupil Size (mm) 2   R Pupil Size (mm) 2          ED Medications  Medications   fluorescein sodium sterile ophthalmic strip 1 strip (1 strip Right Eye Given by Other 10/3/23 1914)   tetracaine 0.5 % ophthalmic solution 2 drop (2 drops Right Eye Given by Other 10/3/23 1914)   erythromycin (ILOTYCIN) 0.5 % ophthalmic ointment 0.5 inch (0.5 inches Right Eye Given by Other 10/3/23 1915)       Diagnostic Studies  Results Reviewed     None                 No orders to display              Procedures  Procedures         ED Course                               SBIRT 22yo+    Flowsheet Row Most Recent Value   Initial Alcohol Screen: US AUDIT-C     1. How often do you have a drink containing alcohol? 0 Filed at: 10/03/2023 1750   2. How many drinks containing alcohol do you have on a typical day you are drinking? 0 Filed at: 10/03/2023 1750   3a. Male UNDER 65: How often do you have five or more drinks on one occasion? 0 Filed at: 10/03/2023 1750   3b. FEMALE Any Age, or MALE 65+: How often do you have 4 or more drinks on one occassion? 0 Filed at: 10/03/2023 1750   Audit-C Score 0 Filed at: 10/03/2023 1750   MERRITT: How many times in the past year have you. .. Used an illegal drug or used a prescription medication for non-medical reasons? Never Filed at: 10/03/2023 1750                    Medical Decision Making  60 female coming in with complaint of right eye redness and mild discomfort that happened spontaneously yesterday when she was doing her hair felt a discomfort when she turned her eye and was looking and then also noticed a spot and some bruising in her eye and over the course of the day has noticed more redness and bruising. No actual blurred or double vision. Had no trauma to her eye. She does not wear contacts.   She wears glasses for reading. She does not take any blood thinners. She has no other medical problems. Patient on eye exam has a right-sided subconjunctival hemorrhage but otherwise pupils reactive, extraocular muscles intact, visual acuity is intact. Fluorescein of eyes without uptake. We will put patient on erythromycin eye ointment but discussed with patient will spontaneously resolve on its own. Discussed with her she can follow-up with ophthalmology. Avoid any eye trauma. Subconjunctival hemorrhage of right eye: acute illness or injury  Risk  Prescription drug management. Disposition  Final diagnoses:   Subconjunctival hemorrhage of right eye     Time reflects when diagnosis was documented in both MDM as applicable and the Disposition within this note     Time User Action Codes Description Comment    10/3/2023  6:45 PM [place], [address] [H11.31] Subconjunctival hemorrhage of right eye       ED Disposition     ED Disposition   Discharge    Condition   Stable    Date/Time   Tue Oct 3, 2023  6:45 PM    Comment   Misael Burk discharge to home/self care.                Follow-up Information     Follow up With Specialties Details Why Contact Braden Almaraz, DO Family Medicine Go to  If symptoms worsen [address] [place] [address],[address] [place] [address] [address] [facility]            Discharge Medication List as of 10/3/2023  6:50 PM      START taking these medications    Details   erythromycin (ILOTYCIN) ophthalmic ointment Administer to the right eye every 6 (six) hours for 7 days, Starting Tue 10/3/2023, Until Tue 10/10/2023, Normal         CONTINUE these medications which have NOT CHANGED    Details   Ascorbic Acid (VITAMIN C ADULT GUMMIES PO) Take 500 mg by mouth daily, Historical Med      cholecalciferol (VITAMIN D3) 400 units tablet Take 400 Units by mouth daily, Historical Med      meloxicam (Mobic) 15 mg tablet Take 1 tablet (15 mg total) by mouth daily, Starting Tue 10/25/2022, Normal      Multiple Vitamins-Minerals (MULTIVITAMIN GUMMIES ADULT PO) Take by mouth daily, Historical Med      naproxen sodium (ALEVE) 220 MG tablet Take 220 mg by mouth once as needed PRN, Historical Med      pantoprazole (PROTONIX) 20 mg tablet TAKE ONE TABLET BY MOUTH EVERY DAY BEFORE BREAKFAST, Normal      phenazopyridine (PYRIDIUM) 100 mg tablet Take 1 tablet (100 mg total) by mouth 3 (three) times a day as needed for bladder spasms, Starting Wed 12/21/2022, Normal      prednisoLONE acetate (PRED FORTE) 1 % ophthalmic suspension Starting Wed 2/8/2023, Historical Med      pregabalin (LYRICA) 75 mg capsule Take 1 capsule (75 mg total) by mouth 2 (two) times a day, Starting Tue 12/6/2022, Normal      Restasis 0.05 % ophthalmic emulsion Starting Thu 2/23/2023, Historical Med      SUMAtriptan (IMITREX) 50 mg tablet Take 1 tablet (50 mg total) by mouth once as needed for migraine for up to 1 dose may repeat in 2 hours if necessary, Starting Tue 4/25/2023, Normal      vitamin E, tocopherol, 1,000 units capsule Take 1,000 Units by mouth daily, Historical Med             No discharge procedures on file.     PDMP Review       Value Time User    PDMP Reviewed  Yes 4/5/2022  3:57 PM Hillary Veronica PA-C          ED Provider  Electronically Signed by           Donald Summers MD  10/04/23 9545

## 2025-06-24 ENCOUNTER — TELEPHONE (OUTPATIENT)
Age: 60
End: 2025-06-24

## (undated) DEVICE — DRESSING MEPILEX BORDER 4 X 10 IN

## (undated) DEVICE — PADDING CAST 6IN COTTON STRL

## (undated) DEVICE — NEEDLE 18 G X 1 1/2 SAFETY

## (undated) DEVICE — 1.6MM KIRSCHNER WIRE WITH 5MM THREAD-TROCAR POINT 150MM
Type: IMPLANTABLE DEVICE | Site: LEG | Status: NON-FUNCTIONAL
Removed: 2022-03-21

## (undated) DEVICE — DRAPE C-ARM X-RAY

## (undated) DEVICE — 2.5MM DRILL BIT/QC/GOLD/180MM

## (undated) DEVICE — PAD GROUNDING ADULT

## (undated) DEVICE — DRAPE EQUIPMENT RF WAND

## (undated) DEVICE — BULB SYRINGE,IRRIGATION WITH PROTECTIVE CAP: Brand: DOVER

## (undated) DEVICE — 3.5MM CORTEX SCREW SELF-TAPPING 45MM: Type: IMPLANTABLE DEVICE | Site: LEG | Status: NON-FUNCTIONAL

## (undated) DEVICE — PROXIMATE SKIN STAPLERS (35 WIDE) CONTAINS 35 STAINLESS STEEL STAPLES (FIXED HEAD): Brand: PROXIMATE

## (undated) DEVICE — PLUMEPEN PRO 10FT

## (undated) DEVICE — DRAPE C-ARMOUR

## (undated) DEVICE — 2.5MM DRILL BIT/QC/GOLD/110MM

## (undated) DEVICE — SUT VICRYL 2-0 CT-1 27 IN J259H

## (undated) DEVICE — 3.5MM DRILL BIT/QC/110MM

## (undated) DEVICE — INTENDED FOR TISSUE SEPARATION, AND OTHER PROCEDURES THAT REQUIRE A SHARP SURGICAL BLADE TO PUNCTURE OR CUT.: Brand: BARD-PARKER SAFETY BLADES SIZE 15, STERILE

## (undated) DEVICE — 3.5MM CORTEX SCREW SELF-TAPPING 42MM: Type: IMPLANTABLE DEVICE | Site: LEG | Status: NON-FUNCTIONAL

## (undated) DEVICE — INTENDED FOR TISSUE SEPARATION, AND OTHER PROCEDURES THAT REQUIRE A SHARP SURGICAL BLADE TO PUNCTURE OR CUT.: Brand: BARD-PARKER SAFETY BLADES SIZE 10, STERILE

## (undated) DEVICE — 3.5MM CORTEX SCREW SELF-TAPPING 44MM: Type: IMPLANTABLE DEVICE | Site: LEG | Status: NON-FUNCTIONAL

## (undated) DEVICE — GLOVE PI ULTRA TOUCH SZ.8.0

## (undated) DEVICE — LIGHT HANDLE COVER SLEEVE DISP BLUE STELLAR

## (undated) DEVICE — IMMOBILIZER KNEE UNIVERSAL 19 IN

## (undated) DEVICE — 1.6MM THREADED GUIDE WIRE 150MM

## (undated) DEVICE — CHLORAPREP HI-LITE 26ML ORANGE

## (undated) DEVICE — SUT ETHILON 3-0 PS-1 18 IN 1663H

## (undated) DEVICE — SUT VICRYL 0 CT-1 27 IN J260H

## (undated) DEVICE — SPONGE SCRUB 4 PCT CHLORHEXIDINE

## (undated) DEVICE — ACE WRAP 6 IN UNSTERILE

## (undated) DEVICE — DRAPE SHEET THREE QUARTER

## (undated) DEVICE — UNIVERSAL MAJOR EXTREMITY,KIT: Brand: CARDINAL HEALTH

## (undated) DEVICE — SCD SEQUENTIAL COMPRESSION COMFORT SLEEVE MEDIUM KNEE LENGTH: Brand: KENDALL SCD

## (undated) DEVICE — GLOVE INDICATOR PI UNDERGLOVE SZ 8.5 BLUE

## (undated) DEVICE — 3.2MM CANNULATED DRILL BIT/QC 170MM

## (undated) DEVICE — 3.5MM CORTEX SCREW SELF-TAPPING 36MM: Type: IMPLANTABLE DEVICE | Site: LEG | Status: NON-FUNCTIONAL

## (undated) DEVICE — 2.8MM PERCUTANEOUS DRILL BIT F/LCP® PL QC/200MM/100MM CALIB: Brand: LCP